# Patient Record
Sex: FEMALE | Race: WHITE | NOT HISPANIC OR LATINO | Employment: UNEMPLOYED | URBAN - METROPOLITAN AREA
[De-identification: names, ages, dates, MRNs, and addresses within clinical notes are randomized per-mention and may not be internally consistent; named-entity substitution may affect disease eponyms.]

---

## 2017-08-15 ENCOUNTER — GENERIC CONVERSION - ENCOUNTER (OUTPATIENT)
Dept: OTHER | Facility: OTHER | Age: 62
End: 2017-08-15

## 2017-09-19 ENCOUNTER — GENERIC CONVERSION - ENCOUNTER (OUTPATIENT)
Dept: OTHER | Facility: OTHER | Age: 62
End: 2017-09-19

## 2017-09-25 ENCOUNTER — ALLSCRIPTS OFFICE VISIT (OUTPATIENT)
Dept: OTHER | Facility: OTHER | Age: 62
End: 2017-09-25

## 2017-09-25 ENCOUNTER — TRANSCRIBE ORDERS (OUTPATIENT)
Dept: RADIOLOGY | Facility: HOSPITAL | Age: 62
End: 2017-09-25

## 2017-09-25 ENCOUNTER — HOSPITAL ENCOUNTER (OUTPATIENT)
Dept: RADIOLOGY | Facility: HOSPITAL | Age: 62
Discharge: HOME | End: 2017-09-25
Payer: COMMERCIAL

## 2017-09-25 DIAGNOSIS — M25.551 PAIN IN RIGHT HIP: ICD-10-CM

## 2017-09-25 PROCEDURE — 73502 X-RAY EXAM HIP UNI 2-3 VIEWS: CPT

## 2017-09-29 ENCOUNTER — GENERIC CONVERSION - ENCOUNTER (OUTPATIENT)
Dept: OTHER | Facility: OTHER | Age: 62
End: 2017-09-29

## 2017-09-29 ENCOUNTER — HOSPITAL ENCOUNTER (OUTPATIENT)
Dept: BONE DENSITY | Facility: IMAGING CENTER | Age: 62
Discharge: HOME | End: 2017-09-29
Payer: COMMERCIAL

## 2017-09-29 DIAGNOSIS — Z12.31 ENCOUNTER FOR SCREENING MAMMOGRAM FOR MALIGNANT NEOPLASM OF BREAST: ICD-10-CM

## 2017-09-29 PROCEDURE — G0202 SCR MAMMO BI INCL CAD: HCPCS

## 2017-10-02 ENCOUNTER — GENERIC CONVERSION - ENCOUNTER (OUTPATIENT)
Dept: OTHER | Facility: OTHER | Age: 62
End: 2017-10-02

## 2017-10-05 ENCOUNTER — ALLSCRIPTS OFFICE VISIT (OUTPATIENT)
Dept: OTHER | Facility: OTHER | Age: 62
End: 2017-10-05

## 2017-10-06 NOTE — PROGRESS NOTES
Assessment  1  Rectocele (954 97) (N81 6)    Discussion/Summary  Discussion Summary:   Rectocele, mild uterine prolapse - explained this to pt in detail  Information given for her to review  Recommended kegels, discussed pessary and surgery  She does not want to do anything at this time  She will follow up with her yearly  Counseling Documentation With Imm: The patient was counseled regarding  Goals and Barriers: The patient has the current Goals: See discussion  The patent has the current Barriers: None  Patient's Capacity to Self-Care: Patient is able to Self-Care  Chief Complaint  Chief Complaint Free Text Note Form: PT C/O DROPPED UTERUS      History of Present Illness  HPI: Pt here for evaluation of prolapse  I noticed it last year on her exam but she was asymptomatic  2 weeks ago, after intercourse, she felt something in the vagina  She had no pain or bleeding  It slowly resolved  Review of Systems  Focused-Female:   Constitutional: No fever, no chills, feels well, no tiredness, no recent weight gain or loss  ENT: no ear ache, no loss of hearing, no nosebleeds or nasal discharge, no sore throat or hoarseness  Cardiovascular: no complaints of slow or fast heart rate, no chest pain, no palpitations, no leg claudication or lower extremity edema  Respiratory: no complaints of shortness of breath, no wheezing, no dyspnea on exertion, no orthopnea or PND  Breasts: no complaints of breast pain, breast lump or nipple discharge  Gastrointestinal: no complaints of abdominal pain, no constipation, no nausea or diarrhea, no vomiting, no bloody stools  Genitourinary: no complaints of dysuria, no incontinence, no pelvic pain, no dysmenorrhea, no vaginal discharge or abnormal vaginal bleeding  Musculoskeletal: no complaints of arthralgia, no myalgia, no joint swelling or stiffness, no limb pain or swelling     Integumentary: no complaints of skin rash or lesion, no itching or dry skin, no skin wounds  Neurological: no complaints of headache, no confusion, no numbness or tingling, no dizziness or fainting  ROS Reviewed:   ROS reviewed  Active Problems  1  Cervical cancer screening (V76 2) (Z12 4)   2  DM2 (diabetes mellitus, type 2) (250 00) (E11 9)   3  Encounter for annual routine gynecological examination (V72 31) (Z01 419)   4  Hip pain, right (719 45) (M25 551)   5  Hyperlipidemia (272 4) (E78 5)   6  Hypertension (401 9) (I10)   7  Hypothyroidism (244 9) (E03 9)   8  Insomnia (780 52) (G47 00)   9  Need for prophylactic vaccination and inoculation against influenza (V04 81) (Z23)   10  Onychomycosis (110 1) (B35 1)   11  Screening for breast cancer (V76 10) (Z12 31)   12  Screening for cervical cancer (V76 2) (Z12 4)   13  Screening for colorectal cancer (V76 51) (Z12 11,Z12 12)   14  Screening for HPV (human papillomavirus) (V73 81) (Z11 51)   15  Skin lesion (709 9) (L98 9)   16  Status post gastric bypass for obesity (V45 86) (Z98 84)   17  Visit for screening mammogram (V76 12) (Z12 31)    Past Medical History  1  Need for prophylactic vaccination and inoculation against influenza (V04 81) (Z23)  Active Problems And Past Medical History Reviewed: The active problems and past medical history were reviewed and updated today  Surgical History  1  History of Gastric Surgery For Morbid Obesity Bypass With Anton-en-Y  Surgical History Reviewed: The surgical history was reviewed and updated today  Family History  Mother    1  Family history of dementia (V17 2) (Z81 8)  Father    2  Family history of hypertension (V17 49) (Z82 49)  Son    3  Family history of Melanoma  Family History Reviewed: The family history was reviewed and updated today  Social History   · Caffeine use (V49 89) (F15 90)   ·    · Never a smoker   · No drug use   · Two children  Social History Reviewed: The social history was reviewed and updated today  Current Meds   1   Levothyroxine Sodium 137 MCG Oral Tablet; TAKE ONE (1) TABLET(S) DAILY; Therapy: 66NJV5700 to (Lonron Pleasure)  Requested for: 05Apr2017; Last   Rx:05Apr2017 Ordered   2  Tylenol PM Extra Strength 500-25 MG Oral Tablet; Therapy: (Clois Lam) to Recorded   3  Vimovo 500-20 MG Oral Tablet Delayed Release; Therapy: (Recorded:05Oct2017) to Recorded  Medication List Reviewed: The medication list was reviewed and updated today  Allergies  1  No Known Drug Allergies    Vitals  Vital Signs    Recorded: 27YEC0963 93:28LY   Systolic 951, LUE, Sitting   Diastolic 82, LUE, Sitting   Height 5 ft 4 in   Weight 183 lb 2 oz   BMI Calculated 31 43   BSA Calculated 1 88   LMP POSTMENOPAUSAL     Physical Exam    Genitourinary   External genitalia: Normal and no lesions appreciated  Vagina: Abnormal  -redundant vaginal walls  Urethra: Normal     Urethral meatus: Normal     Bladder: Normal, soft, non-tender and no prolapse or masses appreciated  -no cystocele  Cervix: Normal, no palpable masses  Uterus: Normal, non-tender, not enlarged, and no palpable masses  -mild uterine prolapse  Adnexa/parametria: Normal, non-tender and no fullness or masses appreciated  Anus, perineum, and rectum: Abnormal   Large rectocele noted      Health Management  DM2 (diabetes mellitus, type 2)   (every) MICROALBUMIN, RANDOM URINE (W/CREATININE); every 1 year; Next Due: 92DGM9614; Overdue  *VB - Foot Exam; every 1 year; Last 68JTP0507; Next Due: 01Jun2016; Overdue  Screening for cervical cancer   (1) THIN PREP PAP WITH IMAGING; every 3 years; Next Due: 12VJW8048; Overdue  Screening for colorectal cancer   COLONOSCOPY; every 10 years; Next Due: 40RLM0851; Overdue  Health Maintenance   (every) MICROALBUMIN, RANDOM URINE (W/CREATININE); every 1 year; Next Due: 97DZL7487; Overdue  *VB - Foot Exam; every 1 year; Last 75QSO0280; Next Due: 01Jun2016;  Overdue    Future Appointments    Date/Time Provider Specialty Site   10/13/2017 09:00 AM Hyun Langford MD General Surgery [facility]     Signatures   Electronically signed by :  Johnny Melgar DO; Oct  5 2017  2:29PM EST                       (Author)

## 2017-10-13 ENCOUNTER — ALLSCRIPTS OFFICE VISIT (OUTPATIENT)
Dept: OTHER | Facility: OTHER | Age: 62
End: 2017-10-13

## 2017-10-24 NOTE — CONSULTS
Assessment  1  Screening for colorectal cancer (V76 51) (Z12 11,Z12 12)   2  DM2 (diabetes mellitus, type 2) (250 00) (E11 9)   3  Hyperlipidemia (272 4) (E78 5)   4  Hypertension (401 9) (I10)   5  Rectocele (618 04) (N81 6)    Discussion/Summary  Discussion Summary:   55-year-old female due for her screening Colonoscopy  Asymptomatic with a negative family history  Colonoscopy  regarding the prep: She has had a gastric bypass and therefore we will spread the volume of the preparation over twice as many hours so she can tolerated  include obesity, type 2 diabetes, hypertension, hyperlipidemia, rectocele  Rectocele should not prevent Colonoscopy but may make this technically more difficult  Chief Complaint  Chief Complaint Free Text Note Form: Colonoscopy consult      History of Present Illness  HPI: 55-year-old female presents for 1st screening Colonoscopy  No prior Colonoscopy  Moves her bowels regularly with no abdominal pain  Notes she has a rectocele recently noted by the OB gyn  She was given some information about pelvic PT and keel exercises  abdominal surgery positive for open cholecystectomy and gastric bypass surgery at [facility] some number of years ago  prior reaction to anesthesia  Nonsmoker  history of maternal great grandmother diagnosed at unknown age with ?colon cancer  No 1st degree relatives with colon cancer  Review of Systems  Complete-Female:   Constitutional: no fever-- and-- no chills  Eyes: no eye pain  ENT: no earache  Cardiovascular: no chest pain-- and-- no palpitations  Respiratory: no cough  Gastrointestinal: no nausea  Genitourinary: no pelvic pain  Musculoskeletal: no joint swelling  Integumentary: no itching  Neurological: no numbness  Psychiatric: no anxiety-- and-- no depression  Endocrine: no muscle weakness  Hematologic/Lymphatic: no tendency for easy bleeding  Active Problems  1  Cervical cancer screening (V76 2) (Z12 4)   2  DM2 (diabetes mellitus, type 2) (250 00) (E11 9)   3  Encounter for annual routine gynecological examination (V72 31) (Z01 419)   4  Hip pain, right (719 45) (M25 551)   5  Hyperlipidemia (272 4) (E78 5)   6  Hypertension (401 9) (I10)   7  Hypothyroidism (244 9) (E03 9)   8  Insomnia (780 52) (G47 00)   9  Need for prophylactic vaccination and inoculation against influenza (V04 81) (Z23)   10  Onychomycosis (110 1) (B35 1)   11  Rectocele (618 04) (N81 6)   12  Screening for breast cancer (V76 10) (Z12 31)   13  Screening for cervical cancer (V76 2) (Z12 4)   14  Screening for colorectal cancer (V76 51) (Z12 11,Z12 12)   15  Screening for HPV (human papillomavirus) (V73 81) (Z11 51)   16  Status post gastric bypass for obesity (V45 86) (Z98 84)   17  Visit for screening mammogram (V76 12) (Z12 31)    Past Medical History  1  Need for prophylactic vaccination and inoculation against influenza (V04 81) (Z23)   2  History of Skin lesion (709 9) (L98 9)  Active Problems And Past Medical History Reviewed: The active problems and past medical history were reviewed and updated today  Surgical History  1  History of Cholecystotomy   2  History of Destruction Of Malignant Lesion, Total Number ___   3  History of Gastric Surgery For Morbid Obesity Bypass With Anton-en-Y  Surgical History Reviewed: The surgical history was reviewed and updated today  Family History  Mother    1  Family history of dementia (V17 2) (Z81 8)  Father    2  Family history of hypertension (V17 49) (Z82 49)  Son    3  Family history of Melanoma  Family History Reviewed: The family history was reviewed and updated today  Social History   · Caffeine use (V49 89) (F15 90)   ·    · Never a smoker   · No drug use   · Two children  Social History Reviewed: The social history was reviewed and updated today  Current Meds   1  Levothyroxine Sodium 137 MCG Oral Tablet; TAKE ONE (1) TABLET(S) DAILY;    Therapy: 47QYJ8736 to (RDGFJUGO:15HMN6580)  Requested for: 21YPL0983; Last   Rx:51Bbx3132 Ordered   2  Tylenol PM Extra Strength 500-25 MG Oral Tablet; Therapy: (Recorded:28Oct2016) to Recorded  Medication List Reviewed: The medication list was reviewed and updated today  Allergies  1  No Known Drug Allergies    Vitals  Vital Signs    Recorded: 34RMO6237 08:47AM   Temperature 97 8 F, Tympanic   Heart Rate 76, L Radial   Respiration 16   Systolic 598, LUE, Sitting   Diastolic 90, LUE, Sitting   Height 5 ft 4 in   Weight 181 lb    BMI Calculated 31 07   BSA Calculated 1 87     Physical Exam    Constitutional Well dressed well-appearing 55-year-old female, slightly obese, appears stated age  Alert and oriented x3  Answering questions appropriately throughout the examination  Pulmonary   Respiratory effort: No increased work of breathing or signs of respiratory distress  Auscultation of lungs: Clear to auscultation, equal breath sounds bilaterally, no wheezes, no rales, no rhonci  Cardiovascular   Auscultation of heart: Normal rate and rhythm, normal S1 and S2, without murmurs  Abdomen Abdomen status post open cholecystectomy with well-healed scar  No palpable hernias noted  Normoactive bowel sounds  No tenderness rebound or guarding  Skin   Skin and subcutaneous tissue: Normal without rashes or lesions  Psychiatric   Orientation to person, place, and time: Normal     Mood and affect: Normal        Provider Comments  Provider Comments:   -   was discussed with the patient at length for Colonoscopy  discussion regarding risks, benefits, and alternatives  Risks of colonoscopy discussed to include bleeding, injury to internal organs, and perforation, which would require surgery for repair  A colon perforation may result in an ostomy bag if necessary for repair  The complications may be identified at the time of procedure or may be delayed in identification     is also a certain percentage of patients who have missed polyps, polyps not seen on the original colonoscopy  This is in inherent risks of the procedure  The patient is aware of Dr Michael Clark and complication rates for colonoscopy  preparation was explained to the patient at length, including a bowel preparation  Information sheet for bowel preparation is both given to patient and explained in detail  was instructed to take their hypertension medications prior to surgery, stop any blood thinners, and modulate their diabetes medications as per their Primary Care Physician's instructions  understand the risks and benefits and agreed to the plan  Questions are discussed and answered at length  the multiple comorbidities and the need for screening endoscopy, medical decision-making determined to be moderate for this visit        Signatures   Electronically signed by : Virlinda Lombard, [facility]; Oct 13 2017  9:27AM EST                       (Author)    Electronically signed by : Keshawn Cuellar MD; Oct 23 2017  1:49PM EST

## 2017-10-27 RX ORDER — ACETAMINOPHEN,DIPHENHYDRAMINE HCL 500; 25 MG/1; MG/1
1 TABLET, FILM COATED ORAL
COMMUNITY
End: 2019-06-21

## 2017-10-27 RX ORDER — LEVOTHYROXINE SODIUM 0.1 MG/1
137 TABLET ORAL DAILY
COMMUNITY
End: 2018-04-27 | Stop reason: SDUPTHER

## 2017-10-30 ENCOUNTER — ANESTHESIA EVENT (OUTPATIENT)
Dept: GASTROENTEROLOGY | Facility: HOSPITAL | Age: 62
End: 2017-10-30
Payer: COMMERCIAL

## 2017-10-31 ENCOUNTER — GENERIC CONVERSION - ENCOUNTER (OUTPATIENT)
Dept: PERIOP | Facility: HOSPITAL | Age: 62
End: 2017-10-31

## 2017-10-31 ENCOUNTER — HOSPITAL ENCOUNTER (OUTPATIENT)
Facility: HOSPITAL | Age: 62
Setting detail: OUTPATIENT SURGERY
Discharge: HOME | End: 2017-10-31
Attending: SURGERY | Admitting: SURGERY
Payer: COMMERCIAL

## 2017-10-31 ENCOUNTER — ANESTHESIA (OUTPATIENT)
Dept: GASTROENTEROLOGY | Facility: HOSPITAL | Age: 62
End: 2017-10-31
Payer: COMMERCIAL

## 2017-10-31 VITALS
DIASTOLIC BLOOD PRESSURE: 69 MMHG | TEMPERATURE: 97.1 F | HEART RATE: 50 BPM | RESPIRATION RATE: 16 BRPM | SYSTOLIC BLOOD PRESSURE: 142 MMHG | BODY MASS INDEX: 28.68 KG/M2 | WEIGHT: 168 LBS | OXYGEN SATURATION: 100 % | HEIGHT: 64 IN

## 2017-10-31 DIAGNOSIS — Z12.11 ENCOUNTER FOR SCREENING FOR MALIGNANT NEOPLASM OF COLON: ICD-10-CM

## 2017-10-31 PROCEDURE — 88305 TISSUE EXAM BY PATHOLOGIST: CPT | Performed by: SURGERY

## 2017-10-31 RX ORDER — SODIUM CHLORIDE 9 MG/ML
125 INJECTION, SOLUTION INTRAVENOUS CONTINUOUS
Status: DISCONTINUED | OUTPATIENT
Start: 2017-10-31 | End: 2017-10-31 | Stop reason: HOSPADM

## 2017-10-31 RX ORDER — PROPOFOL 10 MG/ML
INJECTION, EMULSION INTRAVENOUS AS NEEDED
Status: DISCONTINUED | OUTPATIENT
Start: 2017-10-31 | End: 2017-10-31 | Stop reason: SURG

## 2017-10-31 RX ADMIN — PROPOFOL 50 MG: 10 INJECTION, EMULSION INTRAVENOUS at 09:20

## 2017-10-31 RX ADMIN — PROPOFOL 40 MG: 10 INJECTION, EMULSION INTRAVENOUS at 09:28

## 2017-10-31 RX ADMIN — SODIUM CHLORIDE 125 ML/HR: 0.9 INJECTION, SOLUTION INTRAVENOUS at 08:34

## 2017-10-31 RX ADMIN — PROPOFOL 100 MG: 10 INJECTION, EMULSION INTRAVENOUS at 09:17

## 2017-10-31 RX ADMIN — LIDOCAINE HYDROCHLORIDE 50 MG: 20 INJECTION, SOLUTION INTRAVENOUS at 09:17

## 2017-10-31 RX ADMIN — PROPOFOL 50 MG: 10 INJECTION, EMULSION INTRAVENOUS at 09:24

## 2017-10-31 RX ADMIN — PROPOFOL 40 MG: 10 INJECTION, EMULSION INTRAVENOUS at 09:33

## 2017-10-31 RX ADMIN — PROPOFOL 20 MG: 10 INJECTION, EMULSION INTRAVENOUS at 09:37

## 2017-10-31 NOTE — DISCHARGE SUMMARY
Discharge Summary - Yang Booker 58 y o  female MRN: [id number]    Unit/Bed#: ENDO POOL Encounter: [id number]      Pre-Operative Diagnosis: Pre-Op Diagnosis Codes:     * Encounter for screening for malignant neoplasm of colon [Z12 11]    Post-Operative Diagnosis: Post-Op Diagnosis Codes:     * Encounter for screening for malignant neoplasm of colon [Z12 11]     * Adenomatous polyps [D36 9]     * Colitis [K52 9]    Procedures Performed:  Procedure(s):  COLONOSCOPY with bx and tatoo at 20cm, polypectomies x2    Surgeon: Ani Fernandes MD    See H & P for full details of admission and Operative Note for full details of operations performed  Patient was seen and examined prior to discharge  Provisions for Follow-Up Care:  See After Visit Summary for information related to follow-up care and home orders  Disposition: Home, in stable condition  Planned Readmission: No    Discharge Medications:  See after visit summary for reconciled discharge medications provided to patient and family  Post Operative instructions: Reviewed with patient and/or family  Some portions of this record may have been generated with voice recognition software  There may be translation, syntax,  or grammatical errors  Occasional wrong word or "sound-a-like" substitutions may have occurred due to the inherent limitations of the voice recognition software  Read the chart carefully and recognize, using context, where substations may have occurred  If you have any questions, please contact the dictating provider for clarification or correction, as needed       Signature:   Ani Fernandes MD  Date: 10/31/2017 Time: 9:43 AM

## 2017-10-31 NOTE — ANESTHESIA PREPROCEDURE EVALUATION
Review of Systems/Medical History  Patient summary reviewed  Chart reviewed  No history of anesthetic complications     Cardiovascular  Negative cardio ROS No Hyperlipidemia, No hypertension ,    Pulmonary  Negative pulmonary ROS ,        GI/Hepatic  Negative GI/hepatic ROS   Bowel prep            Endo/Other  Diabetes Diet controlled, History of thyroid disease , hypothyroidism,      GYN  Negative gynecology ROS          Hematology  Negative hematology ROS      Musculoskeletal  Negative musculoskeletal ROS        Neurology  Negative neurology ROS      Psychology   Negative psychology ROS            Physical Exam    Airway    Mallampati score: I  TM Distance: >3 FB  Neck ROM: full     Dental       Cardiovascular  Comment: Negative ROS, Rhythm: regular, Rate: normal, Cardiovascular exam normal    Pulmonary  Pulmonary exam normal Breath sounds clear to auscultation,     Other Findings        Anesthesia Plan  ASA Score- 2       Anesthesia Type- IV sedation with anesthesia with ASA Monitors  Additional Monitors:   Airway Plan:           Induction- intravenous  Informed Consent- Anesthetic plan and risks discussed with patient and spouse  I personally reviewed this patient with the CRNA  Discussed and agreed on the Anesthesia Plan with the CRNA  Carli Cole

## 2017-10-31 NOTE — OP NOTE
COLONOSCOPY with bx and tatoo at 20cm, polypectomies x2  Postoperative Note  PATIENT NAME: Gavino Franklin  : 1955  MRN: [id number]  [facility] GI ROOM 01    Surgery Date: 10/31/2017      Pre operative diagnosis:   Encounter for screening for malignant neoplasm of colon [Z12 11]    Operative Indications:  Due for Colonoscopy      Consent:  The risks, benefits, and alternatives to the surgery were discussed with the patient and with the family prior to surgery if available, personally by Dr Radha Chang  If the consent was obtained by the physician assistant or other representative, the consent was reviewed once again personally by the operating physician  Common complications particular for this procedure as well as unusual complications were discussed, including but not limited to:  bleeding, wound infection, prolonged wound healing, open wounds, reoperation, leak from the bowel or viscus, leak from the bile duct or injury to adjacent or other organs or blood vessels in the abdomen, and possible surgery or reoperation  A  was used if necessary  The patient expressed understanding of the issues discussed and wished and consented to the procedure to proceed  All questions were answered  Dr Radha Chang personally discussed the informed consent with this patient  Post-Operative Diagnosis and Findings:     Diverticulosis and Hemorrhoids     Multiple polyp(s) seen in Sigmoid Colon      Procedure:    Procedure(s):  COLONOSCOPY with bx and tatoo at 20cm, polypectomies x2 with Hot forcep polypectomy      Surgeon(s) and Role:     * Nick Wall MD - Primary    I was present for the entire procedure       Specimens:  ID Type Source Tests Collected by Time Destination   1 : bx at 20cm colon, focal colitis, R/O malignancy Tissue Colon TISSUE EXAM Nick Wall MD 10/31/2017 6487    2 : 1cm polyp at 18cm Tissue Colon TISSUE EXAM Nick Wall MD 10/31/2017 0932    3 : polyp at 15cm Tissue Colon TISSUE Chanelle Chua MD 10/31/2017 0882        Estimated Blood Loss:   Minimal    Anesthesia Type:   Choice     Procedure: The patient was seen in the Holding Room  The risks, benefits, complications, treatment options, and expected outcomes were discussed with the patient  The possibilities of reaction to medication, pulmonary aspiration, perforation of viscus, bleeding, recurrent infection, the need for additional procedures, failure to diagnose a condition, missed polyps, a complication requiring transfusion or operation were discussed with the patient  The patient concurred with the proposed plan, giving informed consent  The patient was taken to Endoscopy Suite, identified as Yang Sax  Staff confirmed patient name, , and procedure  A Time Out was held and the above information confirmed  A visual and digital exam was carried out of the anus and anal canal   Findings were normal unless specified below  The colonoscope was passed per anus and traversed to the cecum  The cecum was clearly visualized in the right lower quadrant by light reflex and palpation  The scope was withdrawn  There were mucosal lesion(s) and/or polyp(s) seen in the colon  These polyps were located at: sigmoid colon  The polyp(s) were addressed using polypectomy technique described above  At approximately 20 cm there was an area of focal colitis which was quite firm and mildly ulcerated  Multiple biopsies, at least 6, were taken of this area  This was not amenable to a snare or hot forceps polypectomy  This area was then marked with a tattoo in the submucosa  There were diverticula scattered throughout the sigmoid colon and grade 1 and 2 hemorrhoids  A photograph was taken  The scope was retroflexed  There were no anorectal lesions other than the hemorrhoids  The scope was removed  The patient tolerated the procedure well  Withdrawal time was greater than 10 minutes  Prep was good  at a 4/5  Some portions of this record may have been generated with voice recognition software  There may be translation, syntax,  or grammatical errors  Occasional wrong word or "sound-a-like" substitutions may have occurred due to the inherent limitations of the voice recognition software  Read the chart carefully and recognize, using context, where substations may have occurred  If you have any questions, please contact the dictating provider for clarification or correction, as needed         Complications: None    Condition: Stable to PACU    SIGNATURE: Arielle Mackay MD   DATE: October 31, 2017   TIME: 9:40 AM

## 2017-10-31 NOTE — ANESTHESIA POSTPROCEDURE EVALUATION
Post-Op Assessment Note      CV Status:  Stable    Mental Status:  Alert and awake    Hydration Status:  Euvolemic    PONV Controlled:  Controlled    Airway Patency:  Patent    Post Op Vitals Reviewed: Yes          Staff: Anesthesiologist, CRNA           BP      Temp     Pulse    Resp      SpO2

## 2017-10-31 NOTE — PROGRESS NOTES
D/C instructions given and pt verbalizes understanding  OOB to ambulate, gait steady denies dizziness

## 2017-10-31 NOTE — DISCHARGE INSTRUCTIONS
[facility]  Endoscopy Post-Operative Instructions  Dr Cristel Allen MD, FACS    Procedure: Colonoscopy    Findings:  Diverticulosis, Hemorrhoids and Polyp(s) and a focal area of colitis that was biopsied  Please call the office to check on these results which might necessitate an earlier follow-up  Follow-Up: You will need a repeat Endoscopy in (generally)5 years  Will await final pathology report for final determination of number of years until your follow up endoscopy, if you had polyps on this exam   Different types of polyps require different lengths of follow up surveillance  Please call our office or your primary doctor's office if you have any questions, once the report is returned  You should have an endoscopy sooner than recommended if you have any symptoms of bleeding or change in stools or other concerns  You will receive a call from our office with your results, in addition to the the preliminary results you received today  You will usually receive a follow-up letter from our office in 1-2 weeks  Call the office if you do not hear from us  You are welcome to also schedule an office visit if desired to discuss the results further  It is your responsibility to contact our office for results in 1- 2 weeks if you do not hear from us  If a follow up endoscopy is needed, you are responsible for arranging that follow up appointment at the appropriate time  The office may or may not issue a reminder at that future time  Please take responsibility for your own follow up healthcare  Diet: Eat a light snack first, and then resume your previous diet  Discharge Medications:  See after visit summary for reconciled discharge medications provided to patient and family        Current Facility-Administered Medications:     sodium chloride 0 9 % infusion, 125 mL/hr, Intravenous, Continuous, Tylor Nj DO, Last Rate: 125 mL/hr at 10/31/17 0834, 125 mL/hr at 10/31/17 0834  Do not take aspirin or blood thinning medications for 2 days following procedure  Tylenol is okay  You may have been given a new medication  Please take this (usually an anti-ulcer -type medication) and see your primary care doctor for refills and follow up medications if needed       After the test: Notify physician for arm swelling or pain at the intravenous site  You may have some abdominal discomfort following the procedure  Belching or passing flatus will help relieve it  Following upper endoscopy, you may experience a temporary sore throat  Saline gargles or lozenges can be taken to relieve sore throat Following lower endoscopy, minor rectal bleeding is not uncommon      Activity: Do not drive a car, operate machinery, or sign legal documents for 24 hours after your procedure  Normal activity may be resumed on the day following the procedure      Call the office at [phone] for any of the following: Severe abdominal pain, significant rectal bleeding, chills, or fever above 100°, new onset of persistent cough or persistent vomiting      [address], [address] [facility]  Phone: [phone]                Colorectal Polyps   WHAT YOU NEED TO KNOW:   Colorectal polyps are small growths of tissue in the lining of the colon and rectum  Most polyps are hyperplastic polyps and are usually benign (noncancerous)  Certain types of polyps, called adenomatous polyps, may turn into cancer  DISCHARGE INSTRUCTIONS:   Follow up with your healthcare provider or gastroenterologist as directed: You may need to return for more tests, such as another colonoscopy  Write down your questions so you remember to ask them during your visits  Reduce your risk for colorectal polyps:   · Eat a variety of healthy foods:  Healthy foods include fruit, vegetables, whole-grain breads, low-fat dairy products, beans, lean meat, and fish   Ask if you need to be on a special diet  · Maintain a healthy weight:  Ask your healthcare provider if you need to lose weight and how much you need to lose  Ask for help with a weight loss program     · Exercise:  Begin to exercise slowly and do more as you get stronger  Talk with your healthcare provider before you start an exercise program      · Limit alcohol:  Your risk for polyps increases the more you drink  · Do not smoke: If you smoke, it is never too late to quit  Ask for information about how to stop  For support and more information:   · Ma[facility] Real ([facility])  [address] [place], [place] [address]  Phone: [phone]  Web Address: www digestive  niddk nih gov  Contact your healthcare provider or gastroenterologist if:   · You have a fever  · You have chills, a cough, or feel weak and achy  · You have abdominal pain that does not go away or gets worse after you take medicine  · Your abdomen is swollen  · You are losing weight without trying  · You have questions or concerns about your condition or care  Seek care immediately or call 911 if:   · You have sudden shortness of breath  · You have a fast heart rate, fast breathing, or are too dizzy to stand up  · You have severe abdominal pain  · You see blood in your bowel movement  © 2017 [address]  Information is for End User's use only and may not be sold, redistributed or otherwise used for commercial purposes  All illustrations and images included in CareNotes® are the copyrighted property of A D A M , Inc  or [employer] Mach[employer]  The above information is an  only  It is not intended as medical advice for individual conditions or treatments  Talk to your doctor, nurse or pharmacist before following any medical regimen to see if it is safe and effective for you        Diverticulosis   WHAT YOU NEED TO KNOW:   Diverticulosis is a condition that causes small pockets called diverticula to form in your intestine  These pockets make it difficult for bowel movements to pass through your digestive system  DISCHARGE INSTRUCTIONS:   Seek care immediately if:   · You have severe pain on the left side of your lower abdomen  · Your bowel movements are bright or dark red  Contact your healthcare provider if:   · You have a fever and chills  · You feel dizzy or lightheaded  · You have nausea, or you are vomiting  · You have a change in your bowel movements  · You have questions or concerns about your condition or care  Medicines:   · Medicines  to soften your bowel movements may be given  You may also need medicines to treat symptoms such as bloating and pain  · Take your medicine as directed  Contact your healthcare provider if you think your medicine is not helping or if you have side effects  Tell him or her if you are allergic to any medicine  Keep a list of the medicines, vitamins, and herbs you take  Include the amounts, and when and why you take them  Bring the list or the pill bottles to follow-up visits  Carry your medicine list with you in case of an emergency  Self-care: The goal of treatment is to manage any symptoms you have and prevent other problems such as diverticulitis  Diverticulitis is swelling or infection of the diverticula  Your healthcare provider may recommend any of the following:  · Eat a variety of high-fiber foods  High-fiber foods help you have regular bowel movements  High-fiber foods include cooked beans, fruits, vegetables, and some cereals  Most adults need 25 to 35 grams of fiber each day  Your healthcare provider may recommend that you have more  Ask your healthcare provider how much fiber you need  Increase fiber slowly  You may have abdominal discomfort, bloating, and gas if you add fiber to your diet too quickly  You may need to take a fiber supplement if you are not getting enough fiber from food  · Drink liquids as directed  You may need to drink 2 to 3 liters (8 to 12 cups) of liquids every day  Ask your healthcare provider how much liquid to drink each day and which liquids are best for you  · Apply heat  on your abdomen for 20 to 30 minutes every 2 hours for as many days as directed  Heat helps decrease pain and muscle spasms  Help prevent diverticulitis or other symptoms: The following may help decrease your risk for diverticulitis or symptoms, such as bleeding  Talk to your provider about these or other things you can do to prevent problems that may occur with diverticulosis  · Exercise regularly  Ask your healthcare provider about the best exercise plan for you  Exercise can help you have regular bowel movements  Get 30 minutes of exercise on most days of the week  · Maintain a healthy weight  Ask your healthcare provider how much you should weigh  Ask him or her to help you create a weight loss plan if you are overweight  · Do not smoke  Nicotine and other chemicals in cigarettes increase your risk for diverticulitis  Ask your healthcare provider for information if you currently smoke and need help to quit  E-cigarettes or smokeless tobacco still contain nicotine  Talk to your healthcare provider before you use these products  · Ask your healthcare provider if it is safe to take NSAIDs  NSAIDs may increase your risk of diverticulitis  Follow up with your healthcare provider as directed:  Write down your questions so you remember to ask them during your visits  © 2017 [address] [employer] Information is for End User's use only and may not be sold, redistributed or otherwise used for commercial purposes  All illustrations and images included in CareNotes® are the copyrighted property of A D A M , Inc  or [address]  The above information is an  only  It is not intended as medical advice for individual conditions or treatments   Talk to your doctor, nurse or pharmacist before following any medical regimen to see if it is safe and effective for you  Hemorrhoids   WHAT YOU NEED TO KNOW:   Hemorrhoids are swollen blood vessels inside your rectum (internal hemorrhoids) or on your anus (external hemorrhoids)  Sometimes a hemorrhoid may prolapse  This means it extends out of your anus  DISCHARGE INSTRUCTIONS:   Seek care immediately if:   · You have severe pain in your rectum or around your anus  · You have severe pain in your abdomen and you are vomiting  · You have bleeding from your anus that soaks through your underwear  Contact your healthcare provider if:   · You have frequent and painful bowel movements  · Your hemorrhoid looks or feels more swollen than usual      · You do not have a bowel movement for 2 days or more  · You see or feel tissue coming through your anus  · You have questions or concerns about your condition or care  Medicines: You may  need any of the following:  · Medicine  may be given to decrease pain, swelling, and itching  The medicine may come as a pad, cream, or ointment  · Stool softeners  help treat or prevent constipation  · NSAIDs , such as ibuprofen, help decrease swelling, pain, and fever  NSAIDs can cause stomach bleeding or kidney problems in certain people  If you take blood thinner medicine, always ask your healthcare provider if NSAIDs are safe for you  Always read the medicine label and follow directions  · Take your medicine as directed  Contact your healthcare provider if you think your medicine is not helping or if you have side effects  Tell him or her if you are allergic to any medicine  Keep a list of the medicines, vitamins, and herbs you take  Include the amounts, and when and why you take them  Bring the list or the pill bottles to follow-up visits  Carry your medicine list with you in case of an emergency    Manage your symptoms:   · Apply ice on your anus for 15 to 20 minutes every hour or as directed  Use an ice pack, or put crushed ice in a plastic bag  Cover it with a towel before you apply it to your anus  Ice helps prevent tissue damage and decreases swelling and pain  · Take a sitz bath  Fill a bathtub with 4 to 6 inches of warm water  You may also use a sitz bath pan that fits inside a toilet bowl  Sit in the sitz bath for 15 minutes  Do this 3 times a day, and after each bowel movement  The warm water can help decrease pain and swelling  · Keep your anal area clean  Gently wash the area with warm water daily  Soap may irritate the area  After a bowel movement, wipe with moist towelettes or wet toilet paper  Dry toilet paper can irritate the area  Prevent hemorrhoids:   · Do not strain to have a bowel movement  Do not sit on the toilet too long  These actions can increase pressure on the tissues in your rectum and anus  · Drink plenty of liquids  Liquids can help prevent constipation  Ask how much liquid to drink each day and which liquids are best for you  · Eat a variety of high-fiber foods  Examples include fruits, vegetables, and whole grains  Ask your healthcare provider how much fiber you need each day  You may need to take a fiber supplement  · Exercise as directed  Exercise, such as walking, may make it easier to have a bowel movement  Ask your healthcare provider to help you create an exercise plan  · Do not have anal sex  Anal sex can weaken the skin around your rectum and anus  · Avoid heavy lifting  This can cause straining and increase your risk for another hemorrhoid  Follow up with your healthcare provider as directed:  Write down your questions so you remember to ask them during your visits  © 2017 [address]  Information is for End User's use only and may not be sold, redistributed or otherwise used for commercial purposes   All illustrations and images included in CareNotes® are the copyrighted property of [employer]  or [employer]  The above information is an  only  It is not intended as medical advice for individual conditions or treatments  Talk to your doctor, nurse or pharmacist before following any medical regimen to see if it is safe and effective for you

## 2017-11-03 ENCOUNTER — GENERIC CONVERSION - ENCOUNTER (OUTPATIENT)
Dept: OTHER | Facility: OTHER | Age: 62
End: 2017-11-03

## 2017-11-08 ENCOUNTER — GENERIC CONVERSION - ENCOUNTER (OUTPATIENT)
Dept: OTHER | Facility: OTHER | Age: 62
End: 2017-11-08

## 2018-01-10 NOTE — MISCELLANEOUS
Message   Date: 19 Sep 2017 4:01 PM EST, Recorded By: Toshia Hong For: Shira Solanoer   Caller: Mellissai Mohs, Self   Phone: [phone] (Home), [phone] (Work)   Reason: Other   Patient called for mammo slip, lost previous one  Mailed per request         Active Problems    1  Cervical cancer screening (V76 2) (Z12 4)   2  DM2 (diabetes mellitus, type 2) (250 00) (E11 9)   3  Encounter for annual routine gynecological examination (V72 31) (Z01 419)   4  Hyperlipidemia (272 4) (E78 5)   5  Hypertension (401 9) (I10)   6  Hypothyroidism (244 9) (E03 9)   7  Insomnia (780 52) (G47 00)   8  Need for prophylactic vaccination and inoculation against influenza (V04 81) (Z23)   9  Onychomycosis (110 1) (B35 1)   10  Screening for breast cancer (V76 10) (Z12 31)   11  Screening for cervical cancer (V76 2) (Z12 4)   12  Screening for colorectal cancer (V76 51) (Z12 11,Z12 12)   13  Screening for HPV (human papillomavirus) (V73 81) (Z11 51)   14  Skin lesion (709 9) (L98 9)   15  Status post gastric bypass for obesity (V45 86) (Z98 84)   16  Visit for screening mammogram (V76 12) (Z12 31)    Current Meds   1  Atorvastatin Calcium 20 MG Oral Tablet; One po q day; Therapy: 14JNB8162 to (Last Rx:68Mux8950)  Requested for: 86Xzf8382 Ordered   2  Levothyroxine Sodium 137 MCG Oral Tablet; TAKE ONE (1) TABLET(S) DAILY; Therapy: 27BYH9720 to (Melinda Shade)  Requested for: 92Zwm1031; Last   Rx:84Kqb4322 Ordered   3  MetFORMIN HCl - 500 MG Oral Tablet; Take 1 tablet twice daily; Therapy: 52BBP5458 to (Marrion Dollar)  Requested for: 91Lqg6388; Last   Rx:10Vrg2052 Ordered   4  Terbinafine HCl - 250 MG Oral Tablet (LamISIL); TAKE 1 TABLET DAILY; Therapy: 88DKD1180 to (Evaluate:93Vpr4197); Last Rx:24Nov2015 Ordered   5  Tylenol PM Extra Strength 500-25 MG Oral Tablet; Therapy: (25-62-29-72) to Recorded   6   Zolpidem Tartrate 10 MG Oral Tablet; TAKE ONE (1) TABLET(S) DAILY AT BEDTIMEAS   NEEDED; Therapy: 95TZI8938 to (Evaluate:27Oct2016); Last Rx:30Jun2016 Ordered    Allergies    1   No Known Drug Allergies    Signatures   Electronically signed by : Alirio Palacios, ; Sep 19 2017  4:03PM EST                       (Author)

## 2018-01-11 NOTE — RESULT NOTES
Verified Results  (1) COMPREHENSIVE METABOLIC PANEL 90RCG5456 99:00WD Yareli Carrillo     Test Name Result Flag Reference   GLUCOSE 115 mg/dL H 65-99   Fasting reference interval   UREA NITROGEN (BUN) 19 mg/dL  7-25   CREATININE 0 85 mg/dL  0 50-0 99   For patients >52years of age, the reference limit  for Creatinine is approximately 13% higher for people  identified as -American  eGFR NON-AFR  AMERICAN 74 mL/min/1 73m2  > OR = 60   eGFR AFRICAN AMERICAN 86 mL/min/1 73m2  > OR = 60   BUN/CREATININE RATIO   2-57   NOT APPLICABLE (calc)   SODIUM 139 mmol/L  135-146   POTASSIUM 4 7 mmol/L  3 5-5 3   CHLORIDE 104 mmol/L     CARBON DIOXIDE 26 mmol/L  19-30   CALCIUM 9 3 mg/dL  8 6-10 4   PROTEIN, TOTAL 6 7 g/dL  6 1-8 1   ALBUMIN 4 3 g/dL  3 6-5 1   GLOBULIN 2 4 g/dL (calc)  1 9-3 7   ALBUMIN/GLOBULIN RATIO 1 8 (calc)  1 0-2 5   BILIRUBIN, TOTAL 0 5 mg/dL  0 2-1 2   ALKALINE PHOSPHATASE 115 U/L     AST 18 U/L  10-35   ALT 15 U/L  6-29     (1) LIPID PANEL, FASTING 75FLY6533 73:04RJ Chapin Rogers     Test Name Result Flag Reference   CHOLESTEROL, TOTAL 177 mg/dL  125-200   HDL CHOLESTEROL 65 mg/dL  > OR = 46   TRIGLICERIDES 671 mg/dL  <150   LDL-CHOLESTEROL 83 mg/dL (calc)  <130   Desirable range <100 mg/dL for patients with CHD or  diabetes and <70 mg/dL for diabetic patients with  known heart disease  CHOL/HDLC RATIO 2 7 (calc)  < OR = 5 0   NON HDL CHOLESTEROL 112 mg/dL (calc)     Target for non-HDL cholesterol is 30 mg/dL higher than   LDL cholesterol target  (Q) TSH, 3RD GENERATION 88UDK8238 74:33PD Yareli Carrillo     Test Name Result Flag Reference   TSH 3 38 mIU/L  0 40-4 50     (Q) HEMOGLOBIN A1c 98AQA7523 62:99XQ Chapin Rogers   REPORT COMMENT:  FASTING:YES     Test Name Result Flag Reference   HEMOGLOBIN A1c 7 0 % of total Hgb H <5 7   According to ADA guidelines, hemoglobin A1c <7 0%  represents optimal control in non-pregnant diabetic  patients   Different metrics may apply to specific  patient populations  Standards of Medical Care in    Diabetes Care  2013;36:s11-s66     For the purpose of screening for the presence of  diabetes  <5 7%       Consistent with the absence of diabetes  5 7-6 4%    Consistent with increased risk for diabetes              (prediabetes)  >or=6 5%    Consistent with diabetes     This assay result is consistent with diabetes  mellitus  Currently, no consensus exists for use of hemoglobin  A1c for diagnosis of diabetes for children  Discussion/Summary   all bw is stable   continue current meds

## 2018-01-12 VITALS
WEIGHT: 181 LBS | RESPIRATION RATE: 16 BRPM | HEART RATE: 76 BPM | SYSTOLIC BLOOD PRESSURE: 150 MMHG | DIASTOLIC BLOOD PRESSURE: 90 MMHG | BODY MASS INDEX: 30.9 KG/M2 | HEIGHT: 64 IN | TEMPERATURE: 97.8 F

## 2018-01-12 NOTE — RESULT NOTES
Discussion/Summary   xray of hip was read as normal  I recommend physical therapy eval  I will print out order     Verified Results  * XR HIP/PELV 2-3 VWS RIGHT W PELVIS IF PERFORMED [id number] 93:89GT Latanya Reid Order Number: [id number]     Test Name Result Flag Reference   * XR HIP/PELV 2-3 VWS RIGHT (Report)     RIGHT HIP     INDICATION: 18 pain x3 weeks, difficulty walking on leg  Describe this joint pain  Attention right hip  COMPARISON: None     VIEWS: AP pelvis and 2 coned down views of the hip     IMAGES: 3     FINDINGS:     There is no acute fracture or dislocation  No degenerative changes  No lytic or blastic lesions are seen  Multiple calcifications in the true pelvis likely are vascular in origin  IMPRESSION:     No acute osseous abnormality  Workstation performed: [id number]     Signed by:   Chris Patino MD   9/28/17       Plan  Hip pain, right    · *1 - [facility]  *  Status: Active  Requested for: 32IPP5942  Care Summary provided  : Yes

## 2018-01-13 NOTE — MISCELLANEOUS
Message   Recorded as Task   Date: 11/07/2017 09:17 AM, Created By: Yessica Hernandez   Task Name: Follow Up   Assigned To: Yessica Hernandez   Regarding Patient: Iverson Saint, Status: Active   CommentYajairaelsa Sanabria - 07 Nov 2017 9:17 AM     TASK CREATED  Pt calling to ask about a bulge she feels and see's around her vaginal area  Saw gyn and it is not related to her uterus or bladder  Feels it may be from inside rectal, colon area  Results of colonoscopy show 20cm area and would like to know if this is the cause of the bulge she has been seeing  Please advise  Sera Peng - 07 Nov 2017 9:31 AM     TASK REPLIED TO: Previously Assigned To Tamera Chiang  As we discussed, this is 20 cm up from the rectum  I do not think this is explaining her bulge in her vagina  She should follow up with GYN or pelvic physical therapy, which we can refer her to  Yessica Hernandez - 08 Nov 2017 4:20 PM     TASK EDITED  Spoke w/ pt about this and referred her back to gyn for further f/u  Active Problems    1  Cervical cancer screening (V76 2) (Z12 4)   2  DM2 (diabetes mellitus, type 2) (250 00) (E11 9)   3  Encounter for annual routine gynecological examination (V72 31) (Z01 419)   4  Hip pain, right (719 45) (M25 551)   5  Hyperlipidemia (272 4) (E78 5)   6  Hypertension (401 9) (I10)   7  Hypothyroidism (244 9) (E03 9)   8  Insomnia (780 52) (G47 00)   9  Need for prophylactic vaccination and inoculation against influenza (V04 81) (Z23)   10  Onychomycosis (110 1) (B35 1)   11  Rectocele (618 04) (N81 6)   12  Screening for breast cancer (V76 10) (Z12 31)   13  Screening for cervical cancer (V76 2) (Z12 4)   14  Screening for colorectal cancer (V76 51) (Z12 11,Z12 12)   15  Screening for HPV (human papillomavirus) (V73 81) (Z11 51)   16  Status post gastric bypass for obesity (V45 86) (Z98 84)   17  Visit for screening mammogram (V76 12) (Z12 31)    Current Meds   1   Levothyroxine Sodium 137 MCG Oral Tablet; TAKE ONE (1) TABLET(S) DAILY; Therapy: 19VGP4941 to (867 3868)  Requested for: 05Apr2017; Last   Rx:05Apr2017 Ordered   2  Tylenol PM Extra Strength 500-25 MG Oral Tablet; Therapy: (Recorded:28Oct2016) to Recorded    Allergies    1   No Known Drug Allergies    Signatures   Electronically signed by : Kai Talavera, ; Nov 8 2017  4:20PM EST                       (Author)

## 2018-01-14 VITALS
BODY MASS INDEX: 30.58 KG/M2 | HEART RATE: 68 BPM | SYSTOLIC BLOOD PRESSURE: 136 MMHG | RESPIRATION RATE: 16 BRPM | TEMPERATURE: 98 F | WEIGHT: 179.13 LBS | HEIGHT: 64 IN | DIASTOLIC BLOOD PRESSURE: 90 MMHG

## 2018-01-14 VITALS
BODY MASS INDEX: 31.27 KG/M2 | WEIGHT: 183.13 LBS | DIASTOLIC BLOOD PRESSURE: 82 MMHG | HEIGHT: 64 IN | SYSTOLIC BLOOD PRESSURE: 140 MMHG

## 2018-01-15 NOTE — MISCELLANEOUS
Message   Recorded as Task   Date: 11/03/2017 11:18 AM, Created By: Enoc Barros   Task Name: Call Back   Assigned To: [facility],Team   Regarding Patient: Homer Otero, Status: Active   Mi Smith - 03 Nov 2017 11:18 AM     TASK CREATED  Please call with result  Hyperplastic polyps were seen  Also colitis  Due for repeat Colonoscopy in 5 years  Sloane Headley - 42 Nov 2017 2:34 PM     TASK EDITED  Detailed message left call w/ questions or concerns  Active Problems    1  Cervical cancer screening (V76 2) (Z12 4)   2  DM2 (diabetes mellitus, type 2) (250 00) (E11 9)   3  Encounter for annual routine gynecological examination (V72 31) (Z01 419)   4  Hip pain, right (719 45) (M25 551)   5  Hyperlipidemia (272 4) (E78 5)   6  Hypertension (401 9) (I10)   7  Hypothyroidism (244 9) (E03 9)   8  Insomnia (780 52) (G47 00)   9  Need for prophylactic vaccination and inoculation against influenza (V04 81) (Z23)   10  Onychomycosis (110 1) (B35 1)   11  Rectocele (618 04) (N81 6)   12  Screening for breast cancer (V76 10) (Z12 31)   13  Screening for cervical cancer (V76 2) (Z12 4)   14  Screening for colorectal cancer (V76 51) (Z12 11,Z12 12)   15  Screening for HPV (human papillomavirus) (V73 81) (Z11 51)   16  Status post gastric bypass for obesity (V45 86) (Z98 84)   17  Visit for screening mammogram (V76 12) (Z12 31)    Current Meds   1  Levothyroxine Sodium 137 MCG Oral Tablet; TAKE ONE (1) TABLET(S) DAILY; Therapy: 31PVU4154 to (Merdis Batter)  Requested for: 05Apr2017; Last   Rx:05Apr2017 Ordered   2  Tylenol PM Extra Strength 500-25 MG Oral Tablet; Therapy: (Recorded:28Oct2016) to Recorded    Allergies    1   No Known Drug Allergies    Signatures   Electronically signed by : Jennifer Cabral, ; Nov  3 2017  2:34PM EST                       (Author)

## 2018-01-15 NOTE — MISCELLANEOUS
Message   Date: 02 Oct 2017 10:54 AM EST, Recorded By: Leta Santo For: Alice Gomes: Rajan Mckeon, Self   Phone: [phone] (Home), [phone] (Work)   Reason: Medical Complaint   pt has a dropped uterus    pt will schedule an appt           Active Problems    1  Cervical cancer screening (V76 2) (Z12 4)   2  DM2 (diabetes mellitus, type 2) (250 00) (E11 9)   3  Encounter for annual routine gynecological examination (V72 31) (Z01 419)   4  Hip pain, right (719 45) (M25 551)   5  Hyperlipidemia (272 4) (E78 5)   6  Hypertension (401 9) (I10)   7  Hypothyroidism (244 9) (E03 9)   8  Insomnia (780 52) (G47 00)   9  Need for prophylactic vaccination and inoculation against influenza (V04 81) (Z23)   10  Onychomycosis (110 1) (B35 1)   11  Screening for breast cancer (V76 10) (Z12 31)   12  Screening for cervical cancer (V76 2) (Z12 4)   13  Screening for colorectal cancer (V76 51) (Z12 11,Z12 12)   14  Screening for HPV (human papillomavirus) (V73 81) (Z11 51)   15  Skin lesion (709 9) (L98 9)   16  Status post gastric bypass for obesity (V45 86) (Z98 84)   17  Visit for screening mammogram (V76 12) (Z12 31)    Current Meds   1  Atorvastatin Calcium 20 MG Oral Tablet; One po q day; Therapy: 08GBG5017 to (Last Rx:74Avh8544)  Requested for: 15Gsp3797 Ordered   2  Levothyroxine Sodium 137 MCG Oral Tablet; TAKE ONE (1) TABLET(S) DAILY; Therapy: 96PMM0353 to (454 [id number])  Requested for: 05Apr2017; Last   Rx:[id number] Ordered   3  MetFORMIN HCl - 500 MG Oral Tablet; Take 1 tablet twice daily; Therapy: 68VBX8274 to (Herschell Bidding)  Requested for: 49Pcb3773; Last   Rx:[id number] Ordered   4  Terbinafine HCl - 250 MG Oral Tablet (LamISIL); TAKE 1 TABLET DAILY; Therapy: 74ZNO6824 to (Evaluate:72Jwe4814); Last Rx:24Nov2015 Ordered   5  Tylenol PM Extra Strength 500-25 MG Oral Tablet; Therapy: (Indira Whipple) to Recorded   6   Zolpidem Tartrate 10 MG Oral Tablet; TAKE ONE (1) TABLET(S) DAILY AT BEDTIMEAS   NEEDED; Therapy: 80MJT8221 to (Evaluate:27Oct2016); Last Rx:30Jun2016 Ordered    Allergies    1   No Known Drug Allergies    Signatures   Electronically signed by : Gino Krause, ; Oct  2 2017 10:54AM EST                       (Author)

## 2018-01-17 NOTE — MISCELLANEOUS
Message   Recorded as Task   Date: 11/01/2017 11:46 AM, Created By: Ellen Crocker   Task Name: Follow Up   Assigned To: [facility],Team   Regarding Patient: Anya Meng, Status: Active   Lexi Aly - 01 Nov 2017 11:46 AM     TASK CREATED  Call post colonoscopy to pt, left message to return call  Ellen Crocker - 01 Nov 2017 1:23 PM     TASK EDITED  Spoke w/ pt and she is doing well, no complaints and is back to normal adl's  Advised to call w/ questions or concerns and path is pending at this time  Ellen Crocker - 90 Nov 2017 2:35 PM     TASK EDITED  See 11/3 task for path results  Active Problems    1  Cervical cancer screening (V76 2) (Z12 4)   2  DM2 (diabetes mellitus, type 2) (250 00) (E11 9)   3  Encounter for annual routine gynecological examination (V72 31) (Z01 419)   4  Hip pain, right (719 45) (M25 551)   5  Hyperlipidemia (272 4) (E78 5)   6  Hypertension (401 9) (I10)   7  Hypothyroidism (244 9) (E03 9)   8  Insomnia (780 52) (G47 00)   9  Need for prophylactic vaccination and inoculation against influenza (V04 81) (Z23)   10  Onychomycosis (110 1) (B35 1)   11  Rectocele (618 04) (N81 6)   12  Screening for breast cancer (V76 10) (Z12 31)   13  Screening for cervical cancer (V76 2) (Z12 4)   14  Screening for colorectal cancer (V76 51) (Z12 11,Z12 12)   15  Screening for HPV (human papillomavirus) (V73 81) (Z11 51)   16  Status post gastric bypass for obesity (V45 86) (Z98 84)   17  Visit for screening mammogram (V76 12) (Z12 31)    Current Meds   1  Levothyroxine Sodium 137 MCG Oral Tablet; TAKE ONE (1) TABLET(S) DAILY; Therapy: 71WBX5762 to (Delmy Duran)  Requested for: 05Apr2017; Last   Rx:05Apr2017 Ordered   2  Tylenol PM Extra Strength 500-25 MG Oral Tablet; Therapy: (Recorded:28Oct2016) to Recorded    Allergies    1   No Known Drug Allergies    Signatures   Electronically signed by : Risa Ferguson, ; Nov  3 2017  2:35PM EST (Author)

## 2018-04-27 ENCOUNTER — TELEPHONE (OUTPATIENT)
Dept: FAMILY MEDICINE CLINIC | Facility: CLINIC | Age: 63
End: 2018-04-27

## 2018-04-27 DIAGNOSIS — E03.9 HYPOTHYROIDISM, UNSPECIFIED TYPE: ICD-10-CM

## 2018-04-27 DIAGNOSIS — E03.9 HYPOTHYROIDISM, UNSPECIFIED TYPE: Primary | ICD-10-CM

## 2018-04-27 RX ORDER — LEVOTHYROXINE SODIUM 0.1 MG/1
137 TABLET ORAL DAILY
Qty: 90 TABLET | Refills: 1 | Status: SHIPPED | OUTPATIENT
Start: 2018-04-27 | End: 2018-04-27 | Stop reason: SDUPTHER

## 2018-04-27 RX ORDER — LEVOTHYROXINE SODIUM 0.1 MG/1
137 TABLET ORAL DAILY
Qty: 135 TABLET | Refills: 3 | Status: SHIPPED | OUTPATIENT
Start: 2018-04-27 | End: 2018-05-01 | Stop reason: SDUPTHER

## 2018-04-27 NOTE — TELEPHONE ENCOUNTER
Elias Guillory from [facility] called stating that the patients Levothyroxine is supposed to be 90 supply quantity of 135 not 150    Please send corrected script to the pharmcay,

## 2018-05-01 ENCOUNTER — TELEPHONE (OUTPATIENT)
Dept: FAMILY MEDICINE CLINIC | Facility: CLINIC | Age: 63
End: 2018-05-01

## 2018-05-01 DIAGNOSIS — E03.9 HYPOTHYROIDISM, UNSPECIFIED TYPE: ICD-10-CM

## 2018-05-01 DIAGNOSIS — E03.9 HYPOTHYROIDISM, UNSPECIFIED TYPE: Primary | ICD-10-CM

## 2018-05-01 RX ORDER — LEVOTHYROXINE SODIUM 0.1 MG/1
TABLET ORAL
Qty: 135 TABLET | Refills: 3 | Status: SHIPPED | OUTPATIENT
Start: 2018-05-01 | End: 2019-06-21 | Stop reason: ALTCHOICE

## 2018-05-01 RX ORDER — LEVOTHYROXINE SODIUM 137 UG/1
TABLET ORAL
COMMUNITY
Start: 2018-02-07 | End: 2018-05-18 | Stop reason: SDUPTHER

## 2018-05-01 NOTE — TELEPHONE ENCOUNTER
RE: Levothyroxine 100 mcg    The quantity needs to be changed to 135 since the patient is to take 1 1/2 tablets daily  Please correct & resend  Thank you

## 2018-05-01 NOTE — TELEPHONE ENCOUNTER
Spoke with pt, this med was sent to [facility] not her mail order pharmacy  I did cancel the local if you could please resend to mail order pharmacy

## 2018-05-18 DIAGNOSIS — E03.9 HYPOTHYROIDISM, UNSPECIFIED TYPE: Primary | ICD-10-CM

## 2018-05-18 RX ORDER — LEVOTHYROXINE SODIUM 137 UG/1
TABLET ORAL
Qty: 90 TABLET | Refills: 2 | Status: SHIPPED | OUTPATIENT
Start: 2018-05-18 | End: 2020-01-08 | Stop reason: SDUPTHER

## 2018-05-25 RX ORDER — LEVOTHYROXINE SODIUM 137 UG/1
137 TABLET ORAL DAILY
Qty: 90 TABLET | Refills: 0 | Status: SHIPPED | OUTPATIENT
Start: 2018-05-25 | End: 2019-06-21 | Stop reason: SDUPTHER

## 2019-04-27 ENCOUNTER — TELEPHONE (OUTPATIENT)
Dept: OTHER | Facility: OTHER | Age: 64
End: 2019-04-27

## 2019-06-17 DIAGNOSIS — E03.9 HYPOTHYROIDISM, UNSPECIFIED TYPE: ICD-10-CM

## 2019-06-17 DIAGNOSIS — E03.9 HYPOTHYROIDISM, UNSPECIFIED TYPE: Primary | ICD-10-CM

## 2019-06-17 RX ORDER — LEVOTHYROXINE SODIUM 0.1 MG/1
TABLET ORAL
Qty: 135 TABLET | Refills: 3 | OUTPATIENT
Start: 2019-06-17

## 2019-06-17 NOTE — TELEPHONE ENCOUNTER
Patient called requesting a RX for bloodwork to have her thyroid checked for a followup appointment  Please advise patient at [phone]

## 2019-06-19 LAB
ALBUMIN SERPL-MCNC: 4.3 G/DL (ref 3.6–5.1)
ALBUMIN/GLOB SERPL: 1.9 (CALC) (ref 1–2.5)
ALP SERPL-CCNC: 133 U/L (ref 33–130)
ALT SERPL-CCNC: 28 U/L (ref 6–29)
AST SERPL-CCNC: 19 U/L (ref 10–35)
BASOPHILS # BLD AUTO: 31 CELLS/UL (ref 0–200)
BASOPHILS NFR BLD AUTO: 0.6 %
BILIRUB SERPL-MCNC: 0.5 MG/DL (ref 0.2–1.2)
BUN SERPL-MCNC: 16 MG/DL (ref 7–25)
BUN/CREAT SERPL: ABNORMAL (CALC) (ref 6–22)
CALCIUM SERPL-MCNC: 9.3 MG/DL (ref 8.6–10.4)
CHLORIDE SERPL-SCNC: 104 MMOL/L (ref 98–110)
CHOLEST SERPL-MCNC: 158 MG/DL
CHOLEST/HDLC SERPL: 3 (CALC)
CO2 SERPL-SCNC: 30 MMOL/L (ref 20–32)
CREAT SERPL-MCNC: 0.91 MG/DL (ref 0.5–0.99)
EOSINOPHIL # BLD AUTO: 68 CELLS/UL (ref 15–500)
EOSINOPHIL NFR BLD AUTO: 1.3 %
ERYTHROCYTE [DISTWIDTH] IN BLOOD BY AUTOMATED COUNT: 13.8 % (ref 11–15)
GLOBULIN SER CALC-MCNC: 2.3 G/DL (CALC) (ref 1.9–3.7)
GLUCOSE SERPL-MCNC: 122 MG/DL (ref 65–99)
HCT VFR BLD AUTO: 42.6 % (ref 35–45)
HDLC SERPL-MCNC: 52 MG/DL
HGB BLD-MCNC: 13.8 G/DL (ref 11.7–15.5)
LDLC SERPL CALC-MCNC: 79 MG/DL (CALC)
LYMPHOCYTES # BLD AUTO: 1732 CELLS/UL (ref 850–3900)
LYMPHOCYTES NFR BLD AUTO: 33.3 %
MCH RBC QN AUTO: 28.3 PG (ref 27–33)
MCHC RBC AUTO-ENTMCNC: 32.4 G/DL (ref 32–36)
MCV RBC AUTO: 87.5 FL (ref 80–100)
MONOCYTES # BLD AUTO: 338 CELLS/UL (ref 200–950)
MONOCYTES NFR BLD AUTO: 6.5 %
NEUTROPHILS # BLD AUTO: 3032 CELLS/UL (ref 1500–7800)
NEUTROPHILS NFR BLD AUTO: 58.3 %
NONHDLC SERPL-MCNC: 106 MG/DL (CALC)
PLATELET # BLD AUTO: 264 THOUSAND/UL (ref 140–400)
PMV BLD REES-ECKER: 10.8 FL (ref 7.5–12.5)
POTASSIUM SERPL-SCNC: 4.7 MMOL/L (ref 3.5–5.3)
PROT SERPL-MCNC: 6.6 G/DL (ref 6.1–8.1)
RBC # BLD AUTO: 4.87 MILLION/UL (ref 3.8–5.1)
SL AMB EGFR AFRICAN AMERICAN: 77 ML/MIN/1.73M2
SL AMB EGFR NON AFRICAN AMERICAN: 67 ML/MIN/1.73M2
SODIUM SERPL-SCNC: 139 MMOL/L (ref 135–146)
TRIGL SERPL-MCNC: 172 MG/DL
TSH SERPL-ACNC: 1.03 MIU/L (ref 0.4–4.5)
WBC # BLD AUTO: 5.2 THOUSAND/UL (ref 3.8–10.8)

## 2019-06-21 ENCOUNTER — OFFICE VISIT (OUTPATIENT)
Dept: FAMILY MEDICINE CLINIC | Facility: CLINIC | Age: 64
End: 2019-06-21
Payer: COMMERCIAL

## 2019-06-21 VITALS
BODY MASS INDEX: 31.41 KG/M2 | HEART RATE: 69 BPM | HEIGHT: 64 IN | OXYGEN SATURATION: 96 % | SYSTOLIC BLOOD PRESSURE: 140 MMHG | RESPIRATION RATE: 16 BRPM | DIASTOLIC BLOOD PRESSURE: 90 MMHG | WEIGHT: 184 LBS | TEMPERATURE: 97.8 F

## 2019-06-21 DIAGNOSIS — E03.9 HYPOTHYROIDISM, UNSPECIFIED TYPE: ICD-10-CM

## 2019-06-21 DIAGNOSIS — E78.5 HYPERLIPIDEMIA, UNSPECIFIED HYPERLIPIDEMIA TYPE: Primary | ICD-10-CM

## 2019-06-21 DIAGNOSIS — I10 ESSENTIAL HYPERTENSION: ICD-10-CM

## 2019-06-21 PROBLEM — N81.6 RECTOCELE: Status: ACTIVE | Noted: 2017-10-05

## 2019-06-21 PROCEDURE — 3008F BODY MASS INDEX DOCD: CPT | Performed by: FAMILY MEDICINE

## 2019-06-21 PROCEDURE — 1036F TOBACCO NON-USER: CPT | Performed by: FAMILY MEDICINE

## 2019-06-21 PROCEDURE — 99214 OFFICE O/P EST MOD 30 MIN: CPT | Performed by: FAMILY MEDICINE

## 2019-06-21 RX ORDER — LEVOTHYROXINE SODIUM 137 UG/1
TABLET ORAL
Qty: 135 TABLET | Refills: 3 | Status: SHIPPED | OUTPATIENT
Start: 2019-06-21 | End: 2019-06-21 | Stop reason: ALTCHOICE

## 2019-06-21 RX ORDER — ATORVASTATIN CALCIUM 20 MG/1
20 TABLET, FILM COATED ORAL DAILY
Qty: 90 TABLET | Refills: 3 | Status: SHIPPED | OUTPATIENT
Start: 2019-06-21 | End: 2020-01-07 | Stop reason: SDUPTHER

## 2019-09-23 ENCOUNTER — OFFICE VISIT (OUTPATIENT)
Dept: FAMILY MEDICINE CLINIC | Facility: CLINIC | Age: 64
End: 2019-09-23
Payer: COMMERCIAL

## 2019-09-23 VITALS
HEIGHT: 64 IN | BODY MASS INDEX: 29.53 KG/M2 | HEART RATE: 64 BPM | DIASTOLIC BLOOD PRESSURE: 84 MMHG | OXYGEN SATURATION: 96 % | WEIGHT: 173 LBS | RESPIRATION RATE: 16 BRPM | SYSTOLIC BLOOD PRESSURE: 140 MMHG | TEMPERATURE: 97 F

## 2019-09-23 DIAGNOSIS — Z12.39 SCREENING FOR MALIGNANT NEOPLASM OF BREAST: ICD-10-CM

## 2019-09-23 DIAGNOSIS — E03.9 HYPOTHYROIDISM, UNSPECIFIED TYPE: ICD-10-CM

## 2019-09-23 DIAGNOSIS — I10 ESSENTIAL HYPERTENSION: ICD-10-CM

## 2019-09-23 DIAGNOSIS — E16.2 HYPOGLYCEMIA: Primary | ICD-10-CM

## 2019-09-23 DIAGNOSIS — Z23 INFLUENZA VACCINE NEEDED: ICD-10-CM

## 2019-09-23 DIAGNOSIS — E78.5 HYPERLIPIDEMIA, UNSPECIFIED HYPERLIPIDEMIA TYPE: ICD-10-CM

## 2019-09-23 PROBLEM — R73.9 HYPERGLYCEMIA: Status: ACTIVE | Noted: 2019-09-23

## 2019-09-23 LAB — SL AMB POCT HEMOGLOBIN AIC: 6.6 (ref ?–6.5)

## 2019-09-23 PROCEDURE — 83036 HEMOGLOBIN GLYCOSYLATED A1C: CPT | Performed by: FAMILY MEDICINE

## 2019-09-23 PROCEDURE — 90686 IIV4 VACC NO PRSV 0.5 ML IM: CPT | Performed by: FAMILY MEDICINE

## 2019-09-23 PROCEDURE — 90471 IMMUNIZATION ADMIN: CPT | Performed by: FAMILY MEDICINE

## 2019-09-23 PROCEDURE — 99213 OFFICE O/P EST LOW 20 MIN: CPT | Performed by: FAMILY MEDICINE

## 2019-09-23 PROCEDURE — 3008F BODY MASS INDEX DOCD: CPT | Performed by: FAMILY MEDICINE

## 2019-09-23 NOTE — ASSESSMENT & PLAN NOTE
Hypoglycemia  I suspect patient is not consume enough calories to keep her blood sugar in normal range  She will try to consume more calories and stick to her weight watchers point allotment for the day    If she continues to experience hyperglycemia she will obtain blood work for insulin level and C-peptide

## 2019-09-23 NOTE — PROGRESS NOTES
FAMILY PRACTICE OFFICE VISIT       NAME: Jo Joseph  AGE: 59 y o  SEX: female       : 1955        MRN: [id number]    DATE: 2019  TIME: 8:32 AM    Assessment and Plan     Problem List Items Addressed This Visit        Endocrine    Hypothyroidism     Hypothyroidism  Patient's latest TSH level was stable on current dose of levothyroxine         Hypoglycemia     Hypoglycemia  I suspect patient is not consume enough calories to keep her blood sugar in normal range  She will try to consume more calories and stick to her weight watchers point allotment for the day  If she continues to experience hyperglycemia she will obtain blood work for insulin level and C-peptide         Relevant Orders    Insulin, fasting    C-peptide       Cardiovascular and Mediastinum    Hypertension       Other    Hyperlipidemia     Hyperlipidemia  Lipid panel stable on current dose of statin therapy           Other Visit Diagnoses     Screening for malignant neoplasm of breast    -  Primary    Relevant Orders    Mammo screening bilateral w 3d & cad          Patient received her annual flu vaccine today  She was also given prescription to obtain mammogram for screening  Chief Complaint     Chief Complaint   Patient presents with    Follow-up     pt is here for 3 mon f/u blood sugars and weight       History of Present Illness     Patient states she has had success losing weight and has lost 11 lb since last office visit  Unfortunately she has had a few episodes of hypoglycemia which she feels fatigued sweats and jitteriness  She does check her blood sugars which show numbers on 1 occasion of 72, and 52 on a separate occasion  Symptoms do improve after patient consumes food  Review of Systems   Review of Systems   Constitutional: Negative  HENT: Negative  Respiratory: Negative  Cardiovascular: Negative  Gastrointestinal: Negative  Genitourinary: Negative  Musculoskeletal: Negative  Neurological: Positive for dizziness  As per HPI       Active Problem List     Patient Active Problem List   Diagnosis    Hyperlipidemia    Hypertension    Hypothyroidism    Onychomycosis    Rectocele    Hyperglycemia    Hypoglycemia       Past Medical History:  Past Medical History:   Diagnosis Date    Diabetes mellitus ([facility] [address]     Disease of thyroid gland     hypo    Hyperlipidemia     Hypertension     Insomnia     Rectocele     Screening for colon cancer        Past Surgical History:  Past Surgical History:   Procedure Laterality Date    CHOLECYSTECTOMY      GASTRECTOMY  2001    ID COLONOSCOPY FLX DX W/COLLJ SPEC WHEN PFRMD N/A 10/31/2017    Procedure: COLONOSCOPY with bx and tatoo at 20cm, polypectomies x2;  Surgeon: Tika Fitzgerald MD;  Location: [facility];   Service: General    SKIN CANCER EXCISION      left neck areal; left upper arm; back       Family History:  Family History   Problem Relation Age of Onset   Valenzuela Dementia Mother     Hypertension Father     Melanoma Son        Social History:  Social History     Socioeconomic History    Marital status: /Civil Union     Spouse name: Not on file    Number of children: 2    Years of education: Not on file    Highest education level: Not on file   Occupational History    Not on file   Social Needs    Financial resource strain: Not on file    Food insecurity:     Worry: Not on file     Inability: Not on file    Transportation needs:     Medical: Not on file     Non-medical: Not on file   Tobacco Use    Smoking status: Never Smoker    Smokeless tobacco: Never Used   Substance and Sexual Activity    Alcohol use: Yes     Comment: social    Drug use: No    Sexual activity: Not on file   Lifestyle    Physical activity:     Days per week: Not on file     Minutes per session: Not on file    Stress: Not on file   Relationships    Social connections:     Talks on phone: Not on file     Gets together: Not on file     Attends Holiness service: Not on file     Active member of club or organization: Not on file     Attends meetings of clubs or organizations: Not on file     Relationship status: Not on file    Intimate partner violence:     Fear of current or ex partner: Not on file     Emotionally abused: Not on file     Physically abused: Not on file     Forced sexual activity: Not on file   Other Topics Concern    Not on file   Social History Narrative    Caffeine use       Objective     Vitals:    09/23/19 0759   BP: 140/84   Pulse: 64   Resp: 16   Temp: (!) 97 °F (36 1 °C)   SpO2: 96%     Wt Readings from Last 3 Encounters:   09/23/19 78 5 kg (173 lb)   06/21/19 83 5 kg (184 lb)   10/31/17 76 2 kg (168 lb)       Physical Exam   Constitutional: She is oriented to person, place, and time  No distress  Cardiovascular:   Regular rate and rhythm with no murmurs   Pulmonary/Chest: Effort normal and breath sounds normal    Lungs are clear to auscultation without wheezes,rales, or rhonchi   Neurological: She is alert and oriented to person, place, and time  No cranial nerve deficit  Psychiatric: She has a normal mood and affect  Her behavior is normal  Judgment and thought content normal      BMI Counseling: Body mass index is 29 7 kg/m²  The BMI is above normal  Nutrition recommendations include reducing portion sizes, decreasing overall calorie intake and 3-5 servings of fruits/vegetables daily  Exercise recommendations include exercising 3-5 times per week    Pertinent Laboratory/Diagnostic Studies:  Lab Results   Component Value Date    BUN 16 06/18/2019    CREATININE 0 91 06/18/2019    CALCIUM 9 3 06/18/2019     06/15/2016    K 4 7 06/18/2019    CO2 30 06/18/2019     06/18/2019     Lab Results   Component Value Date    ALT 28 06/18/2019    AST 19 06/18/2019    ALKPHOS 133 (H) 06/18/2019    BILITOT 0 5 06/15/2016       Lab Results   Component Value Date    WBC 5 2 06/18/2019    HGB 13 8 06/18/2019    HCT 42 6 06/18/2019 MCV 87 5 06/18/2019     06/18/2019       Lab Results   Component Value Date    TSH 1 03 06/18/2019       Lab Results   Component Value Date    CHOL 177 06/15/2016     Lab Results   Component Value Date    TRIG 172 (H) 06/18/2019     Lab Results   Component Value Date    HDL 52 06/18/2019     No results found for: [facility]  Lab Results   Component Value Date    HGBA1C 7 0 (H) 06/15/2016       Results for orders placed or performed in visit on 06/18/19   Lipid panel   Result Value Ref Range    Total Cholesterol 158 <200 mg/dL    HDL 52 >50 mg/dL    Triglycerides 172 (H) <150 mg/dL    LDL Direct 79 mg/dL (calc)    Chol HDLC Ratio 3 0 <5 0 (calc)    Non-HDL Cholesterol 106 <130 mg/dL (calc)   Comprehensive metabolic panel   Result Value Ref Range    Glucose, Random 122 (H) 65 - 99 mg/dL    BUN 16 7 - 25 mg/dL    Creatinine 0 91 0 50 - 0 99 mg/dL    eGFR Non  67 > OR = 60 mL/min/1 73m2    eGFR  77 > OR = 60 mL/min/1 73m2    SL AMB BUN/CREATININE RATIO NOT APPLICABLE 6 - 22 (calc)    Sodium 139 135 - 146 mmol/L    Potassium 4 7 3 5 - 5 3 mmol/L    Chloride 104 98 - 110 mmol/L    CO2 30 20 - 32 mmol/L    SL AMB CALCIUM 9 3 8 6 - 10 4 mg/dL    Protein, Total 6 6 6 1 - 8 1 g/dL    Albumin 4 3 3 6 - 5 1 g/dL    Globulin 2 3 1 9 - 3 7 g/dL (calc)    Albumin/Globulin Ratio 1 9 1 0 - 2 5 (calc)    TOTAL BILIRUBIN 0 5 0 2 - 1 2 mg/dL    Alkaline Phosphatase 133 (H) 33 - 130 U/L    AST 19 10 - 35 U/L    ALT 28 6 - 29 U/L   CBC and differential   Result Value Ref Range    White Blood Cell Count 5 2 3 8 - 10 8 Thousand/uL    Red Blood Cell Count 4 87 3 80 - 5 10 Million/uL    Hemoglobin 13 8 11 7 - 15 5 g/dL    HCT 42 6 35 0 - 45 0 %    MCV 87 5 80 0 - 100 0 fL    MCH 28 3 27 0 - 33 0 pg    MCHC 32 4 32 0 - 36 0 g/dL    RDW 13 8 11 0 - 15 0 %    Platelet Count 835 320 - 400 Thousand/uL    SL AMB MPV 10 8 7 5 - 12 5 fL    Neutrophils (Absolute) 3,032 1,500 - 7,800 cells/uL    Lymphocytes (Absolute) 1,732 850 - 3,900 cells/uL    Monocytes (Absolute) 338 200 - 950 cells/uL    Eosinophils (Absolute) 68 15 - 500 cells/uL    Basophils ABS 31 0 - 200 cells/uL    Neutrophils 58 3 %    Lymphocytes 33 3 %    Monocytes 6 5 %    Eosinophils 1 3 %    Basophils PCT 0 6 %   TSH, 3rd generation   Result Value Ref Range    TSH 1 03 0 40 - 4 50 mIU/L       Orders Placed This Encounter   Procedures    Mammo screening bilateral w 3d & cad    Insulin, fasting    C-peptide       ALLERGIES:  No Known Allergies    Current Medications     Current Outpatient Medications   Medication Sig Dispense Refill    atorvastatin (LIPITOR) 20 mg tablet Take 1 tablet (20 mg total) by mouth daily 90 tablet 3    levothyroxine 137 mcg tablet TAKE 1 TABLET DAILY 90 tablet 2     No current facility-administered medications for this visit            Health Maintenance     Health Maintenance   Topic Date Due    Hepatitis C Screening  1955    BMI: Followup Plan  03/09/1973    INFLUENZA VACCINE  07/01/2019    MAMMOGRAM  09/29/2019    DTaP,Tdap,and Td Vaccines (1 - Tdap) 06/20/2020 (Originally 3/9/1976)    Pneumococcal Vaccine: 65+ Years (1 of 2 - PCV13) 03/09/2020    Depression Screening PHQ  06/21/2020    BMI: Adult  09/23/2020    PAP SMEAR  10/28/2021    CRC Screening: Colonoscopy  10/31/2022    Pneumococcal Vaccine: Pediatrics (0 to 5 Years) and At-Risk Patients (6 to 59 Years)  Aged Out    HEPATITIS B VACCINES  Aged [employer] History   Administered Date(s) Administered    Influenza Quadrivalent Preservative Free 3 years and older IM 11/24/2015, 09/25/2017    Influenza TIV (IM) 01/07/2013, 12/30/2013    Influenza, injectable, quadrivalent, preservative free 0 5 mL 93/70/8319       Chika Hong MD

## 2019-10-07 ENCOUNTER — HOSPITAL ENCOUNTER (OUTPATIENT)
Dept: BONE DENSITY | Facility: IMAGING CENTER | Age: 64
Discharge: HOME | End: 2019-10-07
Payer: COMMERCIAL

## 2019-10-07 VITALS — WEIGHT: 170 LBS | BODY MASS INDEX: 29.02 KG/M2 | HEIGHT: 64 IN

## 2019-10-07 DIAGNOSIS — Z12.39 SCREENING FOR MALIGNANT NEOPLASM OF BREAST: ICD-10-CM

## 2019-10-07 PROCEDURE — 77063 BREAST TOMOSYNTHESIS BI: CPT

## 2019-10-07 PROCEDURE — 77067 SCR MAMMO BI INCL CAD: CPT

## 2019-12-23 ENCOUNTER — OFFICE VISIT (OUTPATIENT)
Dept: FAMILY MEDICINE CLINIC | Facility: CLINIC | Age: 64
End: 2019-12-23
Payer: COMMERCIAL

## 2019-12-23 VITALS
SYSTOLIC BLOOD PRESSURE: 130 MMHG | DIASTOLIC BLOOD PRESSURE: 70 MMHG | BODY MASS INDEX: 27.49 KG/M2 | HEART RATE: 68 BPM | RESPIRATION RATE: 16 BRPM | WEIGHT: 161 LBS | TEMPERATURE: 96.3 F | HEIGHT: 64 IN

## 2019-12-23 DIAGNOSIS — E03.9 HYPOTHYROIDISM, UNSPECIFIED TYPE: Primary | ICD-10-CM

## 2019-12-23 DIAGNOSIS — I10 ESSENTIAL HYPERTENSION: ICD-10-CM

## 2019-12-23 DIAGNOSIS — E11.9 TYPE 2 DIABETES MELLITUS WITHOUT COMPLICATION, WITHOUT LONG-TERM CURRENT USE OF INSULIN (HCC): ICD-10-CM

## 2019-12-23 PROBLEM — R73.9 HYPERGLYCEMIA: Status: RESOLVED | Noted: 2019-09-23 | Resolved: 2019-12-23

## 2019-12-23 PROCEDURE — 3075F SYST BP GE 130 - 139MM HG: CPT | Performed by: FAMILY MEDICINE

## 2019-12-23 PROCEDURE — 3078F DIAST BP <80 MM HG: CPT | Performed by: FAMILY MEDICINE

## 2019-12-23 PROCEDURE — 99213 OFFICE O/P EST LOW 20 MIN: CPT | Performed by: FAMILY MEDICINE

## 2019-12-23 PROCEDURE — 3008F BODY MASS INDEX DOCD: CPT | Performed by: FAMILY MEDICINE

## 2019-12-23 PROCEDURE — 1036F TOBACCO NON-USER: CPT | Performed by: FAMILY MEDICINE

## 2019-12-23 NOTE — PROGRESS NOTES
FAMILY PRACTICE OFFICE VISIT       NAME: Lela Lou  AGE: 59 y o  SEX: female       : 1955        MRN: [id number]    DATE: 2019  TIME: 8:25 AM    Assessment and Plan     Problem List Items Addressed This Visit        Endocrine    Hypothyroidism - Primary     Hypothyroidism  TSH is stable on current dose of levothyroxine         Type 2 diabetes mellitus ([facility] [address] )     Diabetes  Patient with a previous A1c of 6 6  She will obtain blood work as ordered to recheck this value  She has done well with weight loss and blood sugar control with diet and exercise  Lab Results   Component Value Date    HGBA1C 6 6 (A) 2019            Relevant Orders    Comprehensive metabolic panel    Lipid panel    Hemoglobin A1C    TSH, 3rd generation       Cardiovascular and Mediastinum    RESOLVED: Hypertension              Chief Complaint     Chief Complaint   Patient presents with    Follow-up       History of Present Illness     Patient states she continues to lose weight with diet and exercise  She has had an average of 1 day out of the week where she may have feelings of hypoglycemia with blood sugars in 60s or 70s  Symptoms do resolve with eating something which sugar  Refers she does have breakfast lunch and dinner as well as a nighttime snack  She denies any recent illness  Review of Systems   Review of Systems   Constitutional: Negative  HENT: Negative  Respiratory: Negative  Cardiovascular: Negative  Gastrointestinal: Negative  Genitourinary: Negative  Musculoskeletal: Negative  Neurological: Negative  Psychiatric/Behavioral: Negative          Active Problem List     Patient Active Problem List   Diagnosis    Hyperlipidemia    Hypothyroidism    Onychomycosis    Rectocele    Hypoglycemia    Type 2 diabetes mellitus ([facility] [address] )       Past Medical History:  Past Medical History:   Diagnosis Date    Diabetes mellitus ([facility] [address] )     Disease of thyroid gland     hypo    Hyperlipidemia     Hypertension     Insomnia     Rectocele     Screening for colon cancer     Skin cancer        Past Surgical History:  Past Surgical History:   Procedure Laterality Date    CHOLECYSTECTOMY      GASTRECTOMY  2001    ME COLONOSCOPY FLX DX W/COLLJ SPEC WHEN PFRMD N/A 10/31/2017    Procedure: COLONOSCOPY with bx and tatoo at 20cm, polypectomies x2;  Surgeon: Ahsan Savage MD;  Location: [facility];   Service: General    SKIN CANCER EXCISION      left neck areal; left upper arm; back       Family History:  Family History   Problem Relation Age of Onset    Dementia Mother     Hypertension Father     Melanoma Son     No Known Problems Sister     No Known Problems Daughter     No Known Problems Maternal Grandmother     No Known Problems Maternal Grandfather     No Known Problems Paternal Grandmother     No Known Problems Paternal Grandfather     No Known Problems Sister     No Known Problems Sister     No Known Problems Sister     No Known Problems Maternal Aunt     No Known Problems Maternal Aunt     No Known Problems Paternal Aunt        Social History:  Social History     Socioeconomic History    Marital status: /Civil Union     Spouse name: Not on file    Number of children: 2    Years of education: Not on file    Highest education level: Not on file   Occupational History    Not on file   Social Needs    Financial resource strain: Not on file    Food insecurity:     Worry: Not on file     Inability: Not on file    Transportation needs:     Medical: Not on file     Non-medical: Not on file   Tobacco Use    Smoking status: Never Smoker    Smokeless tobacco: Never Used   Substance and Sexual Activity    Alcohol use: Yes     Comment: social    Drug use: No    Sexual activity: Not on file   Lifestyle    Physical activity:     Days per week: Not on file     Minutes per session: Not on file    Stress: Not on file   Relationships    Social connections:     Talks on phone: Not on file     Gets together: Not on file     Attends Denominational service: Not on file     Active member of club or organization: Not on file     Attends meetings of clubs or organizations: Not on file     Relationship status: Not on file    Intimate partner violence:     Fear of current or ex partner: Not on file     Emotionally abused: Not on file     Physically abused: Not on file     Forced sexual activity: Not on file   Other Topics Concern    Not on file   Social History Narrative    Caffeine use       Objective     Vitals:    12/23/19 0757   BP: 130/70   Pulse: 68   Resp: 16   Temp: (!) 96 3 °F (35 7 °C)     Wt Readings from Last 3 Encounters:   12/23/19 73 kg (161 lb)   10/07/19 77 1 kg (170 lb)   09/23/19 78 5 kg (173 lb)       Physical Exam   Constitutional: She is oriented to person, place, and time  No distress  Cardiovascular: Normal rate, regular rhythm and normal heart sounds  No murmur heard  Pulmonary/Chest: Effort normal and breath sounds normal  No respiratory distress  She has no rales  Abdominal:   Abdomen is soft, nontender with positive bowel sounds  There is no rebound or guarding  No masses palpated   Musculoskeletal: She exhibits no edema  Neurological: She is alert and oriented to person, place, and time  No cranial nerve deficit  Psychiatric: She has a normal mood and affect   Her behavior is normal  Judgment and thought content normal       Pertinent Laboratory/Diagnostic Studies:  Lab Results   Component Value Date    BUN 16 06/18/2019    CREATININE 0 91 06/18/2019    CALCIUM 9 3 06/18/2019     06/15/2016    K 4 7 06/18/2019    CO2 30 06/18/2019     06/18/2019     Lab Results   Component Value Date    ALT 28 06/18/2019    AST 19 06/18/2019    ALKPHOS 133 (H) 06/18/2019    BILITOT 0 5 06/15/2016       Lab Results   Component Value Date    WBC 5 2 06/18/2019    HGB 13 8 06/18/2019    HCT 42 6 06/18/2019    MCV 87 5 06/18/2019     06/18/2019 Lab Results   Component Value Date    TSH 1 03 06/18/2019       Lab Results   Component Value Date    CHOL 177 06/15/2016     Lab Results   Component Value Date    TRIG 172 (H) 06/18/2019     Lab Results   Component Value Date    HDL 52 06/18/2019     No results found for: [facility]  Lab Results   Component Value Date    HGBA1C 6 6 (A) 09/23/2019       Results for orders placed or performed in visit on 09/23/19   POCT hemoglobin A1c   Result Value Ref Range    Hemoglobin A1C 6 6 (A) 6 5       Orders Placed This Encounter   Procedures    Comprehensive metabolic panel    Lipid panel    Hemoglobin A1C    TSH, 3rd generation       ALLERGIES:  No Known Allergies    Current Medications     Current Outpatient Medications   Medication Sig Dispense Refill    atorvastatin (LIPITOR) 20 mg tablet Take 1 tablet (20 mg total) by mouth daily 90 tablet 3    levothyroxine 137 mcg tablet TAKE 1 TABLET DAILY 90 tablet 2    Multiple Vitamins-Minerals (MULTIVITAMIN WOMEN 50+ PO) Take 1 caplet by mouth daily       No current facility-administered medications for this visit            Health Maintenance     Health Maintenance   Topic Date Due    Hepatitis C Screening  1955    Diabetic Foot Exam  03/09/1965    URINE MICROALBUMIN  03/09/1965    HIV Screening  03/09/1970    DM Eye Exam  12/02/2016    DTaP,Tdap,and Td Vaccines (1 - Tdap) 06/20/2020 (Originally 3/9/1966)    Pneumococcal Vaccine: 65+ Years (1 of 2 - PCV13) 03/09/2020    HEMOGLOBIN A1C  03/23/2020    BMI: Followup Plan  09/23/2020    Depression Screening PHQ  12/23/2020    BMI: Adult  12/23/2020    MAMMOGRAM  10/07/2021    Cervical Cancer Screening  10/28/2021    CRC Screening: Colonoscopy  10/31/2022    Influenza Vaccine  Completed    Pneumococcal Vaccine: Pediatrics (0 to 5 Years) and At-Risk Patients (6 to 59 Years)  Aged Out    HIB Vaccine  Aged Out    Hepatitis B Vaccine  Aged Out    IPV Vaccine  Aged Out    Hepatitis A Vaccine  Aged Chris  Meningococcal ACWY Vaccine  Aged Out    HPV Vaccine  Aged Out     Immunization History   Administered Date(s) Administered    Influenza Quadrivalent Preservative Free 3 years and older IM 11/24/2015, 09/25/2017    Influenza TIV (IM) 01/07/2013, 12/30/2013    Influenza, injectable, quadrivalent, preservative free 0 5 mL 10/15/2018, 70/84/4468       Leta Quintanilla MD

## 2019-12-23 NOTE — ASSESSMENT & PLAN NOTE
Diabetes  Patient with a previous A1c of 6 6  She will obtain blood work as ordered to recheck this value  She has done well with weight loss and blood sugar control with diet and exercise    Lab Results   Component Value Date    HGBA1C 6 6 (A) 09/23/2019

## 2020-01-07 DIAGNOSIS — E78.5 HYPERLIPIDEMIA, UNSPECIFIED HYPERLIPIDEMIA TYPE: ICD-10-CM

## 2020-01-07 RX ORDER — ATORVASTATIN CALCIUM 20 MG/1
20 TABLET, FILM COATED ORAL DAILY
Qty: 90 TABLET | Refills: 3 | Status: SHIPPED | OUTPATIENT
Start: 2020-01-07 | End: 2022-04-06

## 2020-01-08 DIAGNOSIS — E03.9 HYPOTHYROIDISM, UNSPECIFIED TYPE: ICD-10-CM

## 2020-01-09 RX ORDER — LEVOTHYROXINE SODIUM 137 UG/1
137 TABLET ORAL DAILY
Qty: 90 TABLET | Refills: 2 | Status: SHIPPED | OUTPATIENT
Start: 2020-01-09 | End: 2020-04-13 | Stop reason: SDUPTHER

## 2020-03-13 ENCOUNTER — TELEPHONE (OUTPATIENT)
Dept: FAMILY MEDICINE CLINIC | Facility: CLINIC | Age: 65
End: 2020-03-13

## 2020-03-13 LAB
ALBUMIN SERPL-MCNC: 4.2 G/DL (ref 3.6–5.1)
ALBUMIN/GLOB SERPL: 1.8 (CALC) (ref 1–2.5)
ALP SERPL-CCNC: 117 U/L (ref 37–153)
ALT SERPL-CCNC: 26 U/L (ref 6–29)
AST SERPL-CCNC: 22 U/L (ref 10–35)
BILIRUB SERPL-MCNC: 0.6 MG/DL (ref 0.2–1.2)
BUN SERPL-MCNC: 18 MG/DL (ref 7–25)
BUN/CREAT SERPL: NORMAL (CALC) (ref 6–22)
CALCIUM SERPL-MCNC: 9.5 MG/DL (ref 8.6–10.4)
CHLORIDE SERPL-SCNC: 104 MMOL/L (ref 98–110)
CHOLEST SERPL-MCNC: 157 MG/DL
CHOLEST/HDLC SERPL: 2.6 (CALC)
CO2 SERPL-SCNC: 31 MMOL/L (ref 20–32)
CREAT SERPL-MCNC: 0.97 MG/DL (ref 0.5–0.99)
GLOBULIN SER CALC-MCNC: 2.3 G/DL (CALC) (ref 1.9–3.7)
GLUCOSE SERPL-MCNC: 88 MG/DL (ref 65–99)
HBA1C MFR BLD: 6.1 % OF TOTAL HGB
HDLC SERPL-MCNC: 61 MG/DL
LDLC SERPL CALC-MCNC: 77 MG/DL (CALC)
NONHDLC SERPL-MCNC: 96 MG/DL (CALC)
POTASSIUM SERPL-SCNC: 4.6 MMOL/L (ref 3.5–5.3)
PROT SERPL-MCNC: 6.5 G/DL (ref 6.1–8.1)
SL AMB EGFR AFRICAN AMERICAN: 71 ML/MIN/1.73M2
SL AMB EGFR NON AFRICAN AMERICAN: 61 ML/MIN/1.73M2
SODIUM SERPL-SCNC: 142 MMOL/L (ref 135–146)
TRIGL SERPL-MCNC: 109 MG/DL
TSH SERPL-ACNC: 7.5 MIU/L (ref 0.4–4.5)

## 2020-03-13 NOTE — TELEPHONE ENCOUNTER
Patient was wondering if she can come off of her cholesterol medication from the results of her blood work that was done  Please notify patient at [phone]

## 2020-03-13 NOTE — TELEPHONE ENCOUNTER
She may give herself a trial of discontinuing medication which should have repeat tests in 6 months since naturally it will tend to elevate when she stops medications

## 2020-04-13 ENCOUNTER — TELEPHONE (OUTPATIENT)
Dept: FAMILY MEDICINE CLINIC | Facility: CLINIC | Age: 65
End: 2020-04-13

## 2020-04-13 DIAGNOSIS — E03.9 HYPOTHYROIDISM, UNSPECIFIED TYPE: ICD-10-CM

## 2020-04-13 RX ORDER — LEVOTHYROXINE SODIUM 137 UG/1
137 TABLET ORAL DAILY
Qty: 90 TABLET | Refills: 1 | Status: SHIPPED | OUTPATIENT
Start: 2020-04-13 | End: 2020-04-13 | Stop reason: SDUPTHER

## 2020-04-13 RX ORDER — LEVOTHYROXINE SODIUM 175 UG/1
175 TABLET ORAL DAILY
Qty: 90 TABLET | Refills: 1 | Status: SHIPPED | OUTPATIENT
Start: 2020-04-13 | End: 2020-09-28

## 2020-09-28 DIAGNOSIS — E03.9 HYPOTHYROIDISM, UNSPECIFIED TYPE: ICD-10-CM

## 2020-09-28 RX ORDER — LEVOTHYROXINE SODIUM 175 UG/1
TABLET ORAL
Qty: 90 TABLET | Refills: 1 | Status: SHIPPED | OUTPATIENT
Start: 2020-09-28 | End: 2021-04-13

## 2021-03-10 DIAGNOSIS — Z23 ENCOUNTER FOR IMMUNIZATION: ICD-10-CM

## 2021-03-17 LAB
LEFT EYE DIABETIC RETINOPATHY: NORMAL
RIGHT EYE DIABETIC RETINOPATHY: NORMAL

## 2021-03-24 ENCOUNTER — IMMUNIZATIONS (OUTPATIENT)
Dept: FAMILY MEDICINE CLINIC | Facility: HOSPITAL | Age: 66
End: 2021-03-24

## 2021-03-24 DIAGNOSIS — Z23 ENCOUNTER FOR IMMUNIZATION: Primary | ICD-10-CM

## 2021-03-24 PROCEDURE — 91300 SARS-COV-2 / COVID-19 MRNA VACCINE (PFIZER-BIONTECH) 30 MCG: CPT

## 2021-03-24 PROCEDURE — 0001A SARS-COV-2 / COVID-19 MRNA VACCINE (PFIZER-BIONTECH) 30 MCG: CPT

## 2021-04-02 ENCOUNTER — TELEPHONE (OUTPATIENT)
Dept: FAMILY MEDICINE CLINIC | Facility: CLINIC | Age: 66
End: 2021-04-02

## 2021-04-02 DIAGNOSIS — E03.9 HYPOTHYROIDISM, UNSPECIFIED TYPE: ICD-10-CM

## 2021-04-02 DIAGNOSIS — E11.9 TYPE 2 DIABETES MELLITUS WITHOUT COMPLICATION, WITHOUT LONG-TERM CURRENT USE OF INSULIN (HCC): Primary | ICD-10-CM

## 2021-04-02 DIAGNOSIS — E78.5 HYPERLIPIDEMIA, UNSPECIFIED HYPERLIPIDEMIA TYPE: ICD-10-CM

## 2021-04-02 NOTE — TELEPHONE ENCOUNTER
I will place order in [facility] and patient may obtain blood work prior to appointment

## 2021-04-08 ENCOUNTER — APPOINTMENT (OUTPATIENT)
Dept: LAB | Facility: CLINIC | Age: 66
End: 2021-04-08
Payer: COMMERCIAL

## 2021-04-08 DIAGNOSIS — E11.9 TYPE 2 DIABETES MELLITUS WITHOUT COMPLICATION, WITHOUT LONG-TERM CURRENT USE OF INSULIN (HCC): ICD-10-CM

## 2021-04-08 DIAGNOSIS — E03.9 HYPOTHYROIDISM, UNSPECIFIED TYPE: ICD-10-CM

## 2021-04-08 DIAGNOSIS — E78.5 HYPERLIPIDEMIA, UNSPECIFIED HYPERLIPIDEMIA TYPE: ICD-10-CM

## 2021-04-08 LAB
ALBUMIN SERPL BCP-MCNC: 3.7 G/DL (ref 3.5–5)
ALP SERPL-CCNC: 104 U/L (ref 46–116)
ALT SERPL W P-5'-P-CCNC: 44 U/L (ref 12–78)
ANION GAP SERPL CALCULATED.3IONS-SCNC: 4 MMOL/L (ref 4–13)
AST SERPL W P-5'-P-CCNC: 29 U/L (ref 5–45)
BILIRUB SERPL-MCNC: 0.44 MG/DL (ref 0.2–1)
BUN SERPL-MCNC: 12 MG/DL (ref 5–25)
CALCIUM SERPL-MCNC: 9.1 MG/DL (ref 8.3–10.1)
CHLORIDE SERPL-SCNC: 107 MMOL/L (ref 100–108)
CHOLEST SERPL-MCNC: 181 MG/DL (ref 50–200)
CO2 SERPL-SCNC: 29 MMOL/L (ref 21–32)
CREAT SERPL-MCNC: 0.82 MG/DL (ref 0.6–1.3)
ERYTHROCYTE [DISTWIDTH] IN BLOOD BY AUTOMATED COUNT: 15.7 % (ref 11.6–15.1)
EST. AVERAGE GLUCOSE BLD GHB EST-MCNC: 120 MG/DL
GFR SERPL CREATININE-BSD FRML MDRD: 75 ML/MIN/1.73SQ M
GLUCOSE P FAST SERPL-MCNC: 79 MG/DL (ref 65–99)
HBA1C MFR BLD: 5.8 %
HCT VFR BLD AUTO: 44.5 % (ref 34.8–46.1)
HDLC SERPL-MCNC: 65 MG/DL
HGB BLD-MCNC: 13.9 G/DL (ref 11.5–15.4)
LDLC SERPL CALC-MCNC: 98 MG/DL (ref 0–100)
MCH RBC QN AUTO: 29.5 PG (ref 26.8–34.3)
MCHC RBC AUTO-ENTMCNC: 31.2 G/DL (ref 31.4–37.4)
MCV RBC AUTO: 95 FL (ref 82–98)
NONHDLC SERPL-MCNC: 116 MG/DL
PLATELET # BLD AUTO: 247 THOUSANDS/UL (ref 149–390)
PMV BLD AUTO: 11.5 FL (ref 8.9–12.7)
POTASSIUM SERPL-SCNC: 4.1 MMOL/L (ref 3.5–5.3)
PROT SERPL-MCNC: 7 G/DL (ref 6.4–8.2)
RBC # BLD AUTO: 4.71 MILLION/UL (ref 3.81–5.12)
SODIUM SERPL-SCNC: 140 MMOL/L (ref 136–145)
TRIGL SERPL-MCNC: 91 MG/DL
TSH SERPL DL<=0.05 MIU/L-ACNC: 1.61 UIU/ML (ref 0.36–3.74)
WBC # BLD AUTO: 4.45 THOUSAND/UL (ref 4.31–10.16)

## 2021-04-08 PROCEDURE — 85027 COMPLETE CBC AUTOMATED: CPT

## 2021-04-08 PROCEDURE — 84443 ASSAY THYROID STIM HORMONE: CPT

## 2021-04-08 PROCEDURE — 36415 COLL VENOUS BLD VENIPUNCTURE: CPT

## 2021-04-08 PROCEDURE — 80053 COMPREHEN METABOLIC PANEL: CPT

## 2021-04-08 PROCEDURE — 83036 HEMOGLOBIN GLYCOSYLATED A1C: CPT

## 2021-04-08 PROCEDURE — 80061 LIPID PANEL: CPT

## 2021-04-12 ENCOUNTER — OFFICE VISIT (OUTPATIENT)
Dept: FAMILY MEDICINE CLINIC | Facility: CLINIC | Age: 66
End: 2021-04-12
Payer: COMMERCIAL

## 2021-04-12 VITALS
RESPIRATION RATE: 16 BRPM | SYSTOLIC BLOOD PRESSURE: 140 MMHG | DIASTOLIC BLOOD PRESSURE: 80 MMHG | BODY MASS INDEX: 28.89 KG/M2 | OXYGEN SATURATION: 97 % | HEART RATE: 70 BPM | TEMPERATURE: 97.6 F | WEIGHT: 169.2 LBS | HEIGHT: 64 IN

## 2021-04-12 DIAGNOSIS — E78.5 HYPERLIPIDEMIA, UNSPECIFIED HYPERLIPIDEMIA TYPE: ICD-10-CM

## 2021-04-12 DIAGNOSIS — R10.13 EPIGASTRIC PAIN: ICD-10-CM

## 2021-04-12 DIAGNOSIS — E11.9 TYPE 2 DIABETES MELLITUS WITHOUT COMPLICATION, WITHOUT LONG-TERM CURRENT USE OF INSULIN (HCC): Primary | ICD-10-CM

## 2021-04-12 DIAGNOSIS — E03.9 HYPOTHYROIDISM, UNSPECIFIED TYPE: ICD-10-CM

## 2021-04-12 DIAGNOSIS — Z00.00 PERIODIC HEALTH ASSESSMENT, GENERAL SCREENING, ADULT: ICD-10-CM

## 2021-04-12 LAB
CREAT UR-MCNC: 40.9 MG/DL
MICROALBUMIN UR-MCNC: 6.2 MG/L (ref 0–20)
MICROALBUMIN/CREAT 24H UR: 15 MG/G CREATININE (ref 0–30)
SL AMB POCT HEMOGLOBIN AIC: 5.6 (ref ?–6.5)

## 2021-04-12 PROCEDURE — 3288F FALL RISK ASSESSMENT DOCD: CPT | Performed by: FAMILY MEDICINE

## 2021-04-12 PROCEDURE — 82043 UR ALBUMIN QUANTITATIVE: CPT | Performed by: FAMILY MEDICINE

## 2021-04-12 PROCEDURE — 83036 HEMOGLOBIN GLYCOSYLATED A1C: CPT | Performed by: FAMILY MEDICINE

## 2021-04-12 PROCEDURE — 82570 ASSAY OF URINE CREATININE: CPT | Performed by: FAMILY MEDICINE

## 2021-04-12 PROCEDURE — 99214 OFFICE O/P EST MOD 30 MIN: CPT | Performed by: FAMILY MEDICINE

## 2021-04-12 PROCEDURE — G0439 PPPS, SUBSEQ VISIT: HCPCS | Performed by: FAMILY MEDICINE

## 2021-04-12 NOTE — ASSESSMENT & PLAN NOTE
Diabetes  Patient's A1c is stable with current dieting  Her foot exam is up-to-date    Lab Results   Component Value Date    HGBA1C 5 6 04/12/2021

## 2021-04-12 NOTE — PROGRESS NOTES
Assessment and Plan:     Problem List Items Addressed This Visit        Endocrine    Type 2 diabetes mellitus ([facility] ) - Primary    Relevant Orders    Microalbumin / creatinine urine ratio    POCT hemoglobin A1c      Other Visit Diagnoses     Periodic health assessment, general screening, adult        Relevant Orders    Mammo screening bilateral w 3d & cad           Preventive health issues were discussed with patient, and age appropriate screening tests were ordered as noted in patient's After Visit Summary  Personalized health advice and appropriate referrals for health education or preventive services given if needed, as noted in patient's After Visit Summary  History of Present Illness:     Patient presents for Medicare Annual Wellness visit    Patient Care Team:  Stephan Correa MD as PCP - Dayne Lopez MD as PCP - [address] [place], [place] [address] ([facility])  Rubén Hughes MD Nadyne Constant, MD as Endoscopist     Problem List:     Patient Active Problem List   Diagnosis    Hyperlipidemia    Hypothyroidism    Onychomycosis    Rectocele    Hypoglycemia    Type 2 diabetes mellitus ([facility] [address] )      Past Medical and Surgical History:     Past Medical History:   Diagnosis Date    Diabetes mellitus ([place] [address] )     Disease of thyroid gland     hypo    Hyperlipidemia     Hypertension     Insomnia     Rectocele     Screening for colon cancer     Skin cancer      Past Surgical History:   Procedure Laterality Date    CHOLECYSTECTOMY      GASTRECTOMY  2001    [place] COLONOSCOPY FLX DX W/COLLJ SPEC WHEN PFRMD N/A 10/31/2017    Procedure: COLONOSCOPY with bx and tatoo at 20cm, polypectomies x2;  Surgeon: Miladys Juan MD;  Location: [facility];   Service: General    SKIN CANCER EXCISION      left neck areal; left upper arm; back      Family History:     Family History   Problem Relation Age of Onset    Dementia Mother     Hypertension Father     Melanoma Son     No Known Problems Sister     No Known Problems Daughter     No Known Problems Maternal Grandmother     No Known Problems Maternal Grandfather     No Known Problems Paternal Grandmother     No Known Problems Paternal Grandfather     No Known Problems Sister     No Known Problems Sister     No Known Problems Sister     No Known Problems Maternal Aunt     No Known Problems Maternal Aunt     No Known Problems Paternal Aunt       Social History:     E-Cigarette/Vaping    E-Cigarette Use Never User      E-Cigarette/Vaping Substances    Nicotine No     THC No     CBD No     Flavoring No     Other No     Unknown No      Social History     Socioeconomic History    Marital status: /Civil Union     Spouse name: None    Number of children: 2    Years of education: None    Highest education level: None   Occupational History    None   Social Needs    Financial resource strain: None    Food insecurity     Worry: None     Inability: None    Transportation needs     Medical: None     Non-medical: None   Tobacco Use    Smoking status: Never Smoker    Smokeless tobacco: Never Used   Substance and Sexual Activity    Alcohol use: Yes     Comment: social    Drug use: No    Sexual activity: None   Lifestyle    Physical activity     Days per week: None     Minutes per session: None    Stress: None   Relationships    Social connections     Talks on phone: None     Gets together: None     Attends Hindu service: None     Active member of club or organization: None     Attends meetings of clubs or organizations: None     Relationship status: None    Intimate partner violence     Fear of current or ex partner: None     Emotionally abused: None     Physically abused: None     Forced sexual activity: None   Other Topics Concern    None   Social History Narrative    Caffeine use      Medications and Allergies:     Current Outpatient Medications   Medication Sig Dispense Refill    atorvastatin (LIPITOR) 20 mg tablet Take 1 tablet (20 mg total) by mouth daily 90 tablet 3    levothyroxine 175 mcg tablet take 1 tablet by mouth once daily 90 tablet 1    Multiple Vitamins-Minerals (MULTIVITAMIN WOMEN 50+ PO) Take 1 caplet by mouth daily       No current facility-administered medications for this visit        No Known Allergies   Immunizations:     Immunization History   Administered Date(s) Administered    INFLUENZA 10/05/2020    Influenza Quadrivalent Preservative Free 3 years and older IM 11/24/2015, 09/25/2017    Influenza, injectable, quadrivalent, preservative free 0 5 mL 10/15/2018, 09/23/2019    Influenza, seasonal, injectable 01/07/2013, 12/30/2013    SARS-CoV-2 / COVID-19 mRNA IM (Pfizer-BioNTech) 03/24/2021      Health Maintenance:         Topic Date Due    Hepatitis C Screening  Never done    MAMMOGRAM  10/07/2021    Cervical Cancer Screening  10/28/2021    Colonoscopy Surveillance  10/31/2022    Colorectal Cancer Screening  10/31/2027         Topic Date Due    DTaP,Tdap,and Td Vaccines (1 - Tdap) Never done    Pneumococcal Vaccine: 65+ Years (1 of 1 - PPSV23) Never done    COVID-19 Vaccine (2 - Pfizer 2-dose series) 04/14/2021      Medicare Health Risk Assessment:     /80 (BP Location: Left arm, Patient Position: Sitting, Cuff Size: Adult)   Pulse 70   Temp 97 6 °F (36 4 °C) (Temporal)   Resp 16   Ht 5' 4" (1 626 m)   Wt 76 7 kg (169 lb 3 2 oz)   SpO2 97%   BMI 29 04 kg/m²          Depression Screening:   PHQ-2 Score: 0      PREVENTIVE SCREENINGS      Cardiovascular Screening:    General: Screening Not Indicated and History Lipid Disorder      Diabetes Screening:     General: Screening Not Indicated and History Diabetes      Colorectal Cancer Screening:     General: Screening Current      Breast Cancer Screening:     General: Screening Current      Cervical Cancer Screening:    General: Screening Not Indicated      Lung Cancer Screening:     General: Screening Not Indicated      Sandra Wilkes MD

## 2021-04-12 NOTE — ASSESSMENT & PLAN NOTE
Epigastric pain  Patient was given recommendation to try over-the-counter famotidine 20 mg 1 p o  q day    If symptoms persist after 2 weeks she was encouraged to call the office for referral to gastroenterologist for further evaluation

## 2021-04-12 NOTE — PROGRESS NOTES
FAMILY PRACTICE OFFICE VISIT       NAME: Kasandra Vigil  AGE: 77 y o  SEX: female       : 1955        MRN: [id number]    DATE: 2021  TIME: 10:44 AM    Assessment and Plan     Problem List Items Addressed This Visit        Endocrine    Hypothyroidism       Hypothyroidism  Patient's TSH is stable on current dose of levothyroxine         Type 2 diabetes mellitus ([address] ) - Primary      Diabetes  Patient's A1c is stable with current dieting  Her foot exam is up-to-date  Lab Results   Component Value Date    HGBA1C 5 6 2021            Relevant Orders    Microalbumin / creatinine urine ratio    POCT hemoglobin A1c       Other    Hyperlipidemia       Hyperlipidemia  Lipid panel stable on current dose of statin therapy         Epigastric pain      Epigastric pain  Patient was given recommendation to try over-the-counter famotidine 20 mg 1 p o  q day  If symptoms persist after 2 weeks she was encouraged to call the office for referral to gastroenterologist for further evaluation           Other Visit Diagnoses     Periodic health assessment, general screening, adult        Relevant Orders    Mammo screening bilateral w 3d & cad        BMI Counseling: Body mass index is 29 04 kg/m²  The BMI is above normal  Nutrition recommendations include decreasing portion sizes and moderation in carbohydrate intake  Exercise recommendations include exercising 3-5 times per week  Chief Complaint     Chief Complaint   Patient presents with    Annual Exam     foot exam     Medicare Wellness Visit       History of Present Illness       Patient in the office to have evaluation of chronic medical condition  She denies any recent illness  She has been trying to diet to lose weight  She states that for years she has had left abdominal discomfort that occurs on a daily basis  Symptoms improve after eating a sufficient amount of food    In the past she has been on weight watchers diet as well as a keto diet to try and lose weight  Her A1c in the office was stable at 5 6  she had not been obtaining a regular form of exercise in the past due to taking care of her mother with dementia however her mother did pass away in January 2021  Patient will see about being more active this summer with a walking regimen  Review of Systems   Review of Systems   Constitutional: Negative  HENT: Negative  Eyes: Negative  Respiratory: Negative  Cardiovascular: Negative  Gastrointestinal: Positive for abdominal pain  Negative for constipation, diarrhea, nausea and rectal pain  Genitourinary: Negative  Musculoskeletal: Negative  Skin: Negative  Neurological: Negative  Psychiatric/Behavioral: Negative  Active Problem List     Patient Active Problem List   Diagnosis    Hyperlipidemia    Hypothyroidism    Onychomycosis    Rectocele    Hypoglycemia    Type 2 diabetes mellitus ([facility] [address] )    Epigastric pain       Past Medical History:  Past Medical History:   Diagnosis Date    [facility]     Disease of thyroid gland     hypo    Hyperlipidemia     Hypertension     Insomnia     Rectocele     Screening for colon cancer     Skin cancer        Past Surgical History:  Past Surgical History:   Procedure Laterality Date    CHOLECYSTECTOMY      GASTRECTOMY  2001    [place] COLONOSCOPY FLX DX W/COLLJ SPEC WHEN PFRMD N/A 10/31/2017    Procedure: COLONOSCOPY with bx and tatoo at 20cm, polypectomies x2;  Surgeon: Po Fabian MD;  Location: [facility];   Service: General    SKIN CANCER EXCISION      left neck areal; left upper arm; back       Family History:  Family History   Problem Relation Age of Onset    Dementia Mother     Hypertension Father     Melanoma Son     No Known Problems Sister     No Known Problems Daughter     No Known Problems Maternal Grandmother     No Known Problems Maternal Grandfather     No Known Problems Paternal Grandmother     No Known Problems Paternal Grandfather     No Known Problems Sister     No Known Problems Sister     No Known Problems Sister     No Known Problems Maternal Aunt     No Known Problems Maternal Aunt     No Known Problems Paternal Aunt        Social History:  Social History     Socioeconomic History    Marital status: /Civil Union     Spouse name: Not on file    Number of children: 2    Years of education: Not on file    Highest education level: Not on file   Occupational History    Not on file   Social Needs    Financial resource strain: Not on file    Food insecurity     Worry: Not on file     Inability: Not on file   [employer] needs     Medical: Not on file     Non-medical: Not on file   Tobacco Use    Smoking status: Never Smoker    Smokeless tobacco: Never Used   Substance and Sexual Activity    Alcohol use: Yes     Comment: social    Drug use: No    Sexual activity: Not on file   Lifestyle    Physical activity     Days per week: Not on file     Minutes per session: Not on file    Stress: Not on file   Relationships    Social connections     Talks on phone: Not on file     Gets together: Not on file     Attends Worship service: Not on file     Active member of club or organization: Not on file     Attends meetings of clubs or organizations: Not on file     Relationship status: Not on file    Intimate partner violence     Fear of current or ex partner: Not on file     Emotionally abused: Not on file     Physically abused: Not on file     Forced sexual activity: Not on file   Other Topics Concern    Not on file   Social History Narrative    Caffeine use       Objective     Vitals:    04/12/21 0842   BP: 140/80   Pulse: 70   Resp: 16   Temp: 97 6 °F (36 4 °C)   SpO2: 97%     Wt Readings from Last 3 Encounters:   04/12/21 76 7 kg (169 lb 3 2 oz)   12/23/19 73 kg (161 lb)   10/07/19 77 1 kg (170 lb)       Physical Exam  Vitals signs and nursing note reviewed     Constitutional:       General: She is not in acute distress  Appearance: Normal appearance  She is well-developed  She is not ill-appearing  HENT:      Head: Normocephalic and atraumatic  Right Ear: Tympanic membrane, ear canal and external ear normal  There is no impacted cerumen  Left Ear: Tympanic membrane, ear canal and external ear normal  There is no impacted cerumen  Eyes:      General:         Right eye: No discharge  Left eye: No discharge  Extraocular Movements: Extraocular movements intact  Conjunctiva/sclera: Conjunctivae normal       Pupils: Pupils are equal, round, and reactive to light  Neck:      Musculoskeletal: Normal range of motion and neck supple  Thyroid: No thyromegaly  Cardiovascular:      Rate and Rhythm: Normal rate and regular rhythm  Pulses:           Dorsalis pedis pulses are 2+ on the right side and 2+ on the left side  Posterior tibial pulses are 2+ on the right side and 2+ on the left side  Heart sounds: Normal heart sounds  No murmur  Pulmonary:      Effort: Pulmonary effort is normal  No respiratory distress  Breath sounds: Normal breath sounds  No wheezing, rhonchi or rales  Abdominal:      General: Bowel sounds are normal       Palpations: Abdomen is soft  Tenderness: There is no abdominal tenderness  There is no guarding or rebound  Comments: NO hepatospenomegaly   Musculoskeletal: Normal range of motion  Right lower leg: No edema  Left lower leg: No edema  Feet:      Right foot:      Skin integrity: No ulcer, skin breakdown, erythema, warmth, callus or dry skin  Left foot:      Skin integrity: No ulcer, skin breakdown, erythema, warmth, callus or dry skin  Lymphadenopathy:      Cervical: No cervical adenopathy  Skin:     Comments: NO RASHES   Neurological:      General: No focal deficit present  Mental Status: She is alert and oriented to person, place, and time  Mental status is at baseline  Cranial Nerves:  No cranial nerve deficit  Psychiatric:         Mood and Affect: Mood normal          Behavior: Behavior normal          Thought Content: Thought content normal          Judgment: Judgment normal      Patient's shoes and socks removed  Right Foot/Ankle   Right Foot Inspection  Skin Exam: skin normal and skin intact no dry skin, no warmth, no callus, no erythema, no maceration, no abnormal color, no pre-ulcer, no ulcer and no callus                          Toe Exam: ROM and strength within normal limits  Sensory   Vibration: intact    Monofilament testing: intact  Vascular    The right DP pulse is 2+  The right PT pulse is 2+  Left Foot/Ankle  Left Foot Inspection  Skin Exam: skin normal and skin intactno dry skin, no warmth, no erythema, no maceration, normal color, no pre-ulcer, no ulcer and no callus                         Toe Exam: ROM and strength within normal limits                   Sensory   Vibration: intact    Monofilament: intact  Vascular    The left DP pulse is 2+  The left PT pulse is 2+  Assign Risk Category:  No deformity present;  No loss of protective sensation;        Risk: 0      Pertinent Laboratory/Diagnostic Studies:  Lab Results   Component Value Date    BUN 12 04/08/2021    CREATININE 0 82 04/08/2021    CALCIUM 9 1 04/08/2021     06/15/2016    K 4 1 04/08/2021    CO2 29 04/08/2021     04/08/2021     Lab Results   Component Value Date    ALT 44 04/08/2021    AST 29 04/08/2021    ALKPHOS 104 04/08/2021    BILITOT 0 5 06/15/2016       Lab Results   Component Value Date    WBC 4 45 04/08/2021    HGB 13 9 04/08/2021    HCT 44 5 04/08/2021    MCV 95 04/08/2021     04/08/2021       Lab Results   Component Value Date    TSH 7 50 (H) 03/12/2020       Lab Results   Component Value Date    CHOL 177 06/15/2016     Lab Results   Component Value Date    TRIG 91 04/08/2021     Lab Results   Component Value Date    HDL 65 04/08/2021     Lab Results   Component Value Date    LDLCALC 98 04/08/2021     Lab Results   Component Value Date    HGBA1C 5 6 04/12/2021       Results for orders placed or performed in visit on 04/12/21   POCT hemoglobin A1c   Result Value Ref Range    Hemoglobin A1C 5 6 6 5       Orders Placed This Encounter   Procedures    Mammo screening bilateral w 3d & cad    Microalbumin / creatinine urine ratio    POCT hemoglobin A1c       ALLERGIES:  No Known Allergies    Current Medications     Current Outpatient Medications   Medication Sig Dispense Refill    atorvastatin (LIPITOR) 20 mg tablet Take 1 tablet (20 mg total) by mouth daily 90 tablet 3    levothyroxine 175 mcg tablet take 1 tablet by mouth once daily 90 tablet 1    Multiple Vitamins-Minerals (MULTIVITAMIN WOMEN 50+ PO) Take 1 caplet by mouth daily       No current facility-administered medications for this visit            Health Maintenance     Health Maintenance   Topic Date Due    Hepatitis C Screening  Never done    Medicare Annual Wellness Visit (AWV)  Never done    Diabetic Foot Exam  Never done    URINE MICROALBUMIN  Never done    DTaP,Tdap,and Td Vaccines (1 - Tdap) Never done    Fall Risk  Never done    Pneumococcal Vaccine: 65+ Years (1 of 1 - PPSV23) Never done    BMI: Followup Plan  09/23/2020    COVID-19 Vaccine (2 - Pfizer 2-dose series) 04/14/2021    MAMMOGRAM  10/07/2021    HEMOGLOBIN A1C  10/08/2021    Cervical Cancer Screening  10/28/2021    Depression Screening PHQ  04/12/2022    BMI: Adult  04/12/2022    Colonoscopy Surveillance  10/31/2022    DM Eye Exam  03/17/2023    Colorectal Cancer Screening  10/31/2027    Influenza Vaccine  Completed    HIB Vaccine  Aged Out    Hepatitis B Vaccine  Aged Out    IPV Vaccine  Aged Out    Hepatitis A Vaccine  Aged Out    Meningococcal ACWY Vaccine  Aged Out    HPV Vaccine  Aged Out     Immunization History   Administered Date(s) Administered    INFLUENZA 10/05/2020    Influenza Quadrivalent Preservative Free 3 years and older IM 11/24/2015, 09/25/2017    Influenza, injectable, quadrivalent, preservative free 0 5 mL 10/15/2018, 09/23/2019    Influenza, seasonal, injectable 01/07/2013, 12/30/2013    SARS-CoV-2 / COVID-19 mRNA IM (Pfizer-BioNTech) 70/26/0544       Denny Long MD     I spent 25 minutes with this patient which greater than 50% spent counseling

## 2021-04-12 NOTE — PROGRESS NOTES
Assessment and Plan:     Problem List Items Addressed This Visit        Endocrine    Type 2 diabetes mellitus ([facility] [address] ) - Primary    Relevant Orders    Microalbumin / creatinine urine ratio    POCT hemoglobin A1c      Other Visit Diagnoses     Periodic health assessment, general screening, adult        Relevant Orders    Mammo screening bilateral w 3d & cad           Preventive health issues were discussed with patient, and age appropriate screening tests were ordered as noted in patient's After Visit Summary  Personalized health advice and appropriate referrals for health education or preventive services given if needed, as noted in patient's After Visit Summary  History of Present Illness:     Patient presents for Medicare Annual Wellness visit    Patient Care Team:  Judith Patel MD as PCP - Zafar Harrington MD as PCP - [address] [place], [place] [address] [facility] ([facility])  Evie Chavez MD Gustavo Marcus, MD as Endoscopist     Problem List:     Patient Active Problem List   Diagnosis    Hyperlipidemia    Hypothyroidism    Onychomycosis    Rectocele    Hypoglycemia    Type 2 diabetes mellitus ([facility] [address] )      Past Medical and Surgical History:     Past Medical History:   Diagnosis Date    Diabetes mellitus ([facility] )     Disease of thyroid gland     hypo    Hyperlipidemia     Hypertension     Insomnia     Rectocele     Screening for colon cancer     Skin cancer      Past Surgical History:   Procedure Laterality Date    CHOLECYSTECTOMY      GASTRECTOMY  2001    TX COLONOSCOPY FLX DX W/COLLJ SPEC WHEN PFRMD N/A 10/31/2017    Procedure: COLONOSCOPY with bx and tatoo at 20cm, polypectomies x2;  Surgeon: Usman Echavarria MD;  Location: [facility];   Service: General    SKIN CANCER EXCISION      left neck areal; left upper arm; back      Family History:     Family History   Problem Relation Age of Onset    Dementia Mother     Hypertension Father     Melanoma Son     No Known Problems Sister     No Known Problems Daughter     No Known Problems Maternal Grandmother     No Known Problems Maternal Grandfather     No Known Problems Paternal Grandmother     No Known Problems Paternal Grandfather     No Known Problems Sister     No Known Problems Sister     No Known Problems Sister     No Known Problems Maternal Aunt     No Known Problems Maternal Aunt     No Known Problems Paternal Aunt       Social History:     E-Cigarette/Vaping    E-Cigarette Use Never User      E-Cigarette/Vaping Substances    Nicotine No     THC No     CBD No     Flavoring No     Other No     Unknown No      Social History     Socioeconomic History    Marital status: /Civil Union     Spouse name: None    Number of children: 2    Years of education: None    Highest education level: None   Occupational History    None   Social Needs    Financial resource strain: None    Food insecurity     Worry: None     Inability: None    Transportation needs     Medical: None     Non-medical: None   Tobacco Use    Smoking status: Never Smoker    Smokeless tobacco: Never Used   Substance and Sexual Activity    Alcohol use: Yes     Comment: social    Drug use: No    Sexual activity: None   Lifestyle    Physical activity     Days per week: None     Minutes per session: None    Stress: None   Relationships    Social connections     Talks on phone: None     Gets together: None     Attends Jainism service: None     Active member of club or organization: None     Attends meetings of clubs or organizations: None     Relationship status: None    Intimate partner violence     Fear of current or ex partner: None     Emotionally abused: None     Physically abused: None     Forced sexual activity: None   Other Topics Concern    None   Social History Narrative    Caffeine use      Medications and Allergies:     Current Outpatient Medications   Medication Sig Dispense Refill    atorvastatin (LIPITOR) 20 mg tablet Take 1 tablet (20 mg total) by mouth daily 90 tablet 3    levothyroxine 175 mcg tablet take 1 tablet by mouth once daily 90 tablet 1    Multiple Vitamins-Minerals (MULTIVITAMIN WOMEN 50+ PO) Take 1 caplet by mouth daily       No current facility-administered medications for this visit  No Known Allergies   Immunizations:     Immunization History   Administered Date(s) Administered    INFLUENZA 10/05/2020    Influenza Quadrivalent Preservative Free 3 years and older IM 11/24/2015, 09/25/2017    Influenza, injectable, quadrivalent, preservative free 0 5 mL 10/15/2018, 09/23/2019    Influenza, seasonal, injectable 01/07/2013, 12/30/2013    SARS-CoV-2 / COVID-19 mRNA IM (Pfizer-BioNTech) 03/24/2021      Health Maintenance:         Topic Date Due    Hepatitis C Screening  Never done    MAMMOGRAM  10/07/2021    Cervical Cancer Screening  10/28/2021    Colonoscopy Surveillance  10/31/2022    Colorectal Cancer Screening  10/31/2027         Topic Date Due    DTaP,Tdap,and Td Vaccines (1 - Tdap) Never done    Pneumococcal Vaccine: 65+ Years (1 of 1 - PPSV23) Never done    COVID-19 Vaccine (2 - Pfizer 2-dose series) 04/14/2021      Medicare Health Risk Assessment:     /80 (BP Location: Left arm, Patient Position: Sitting, Cuff Size: Adult)   Pulse 70   Temp 97 6 °F (36 4 °C) (Temporal)   Resp 16   Ht 5' 4" (1 626 m)   Wt 76 7 kg (169 lb 3 2 oz)   SpO2 97%   BMI 29 04 kg/m²      Adriana Haywood is here for her Subsequent Wellness visit  Health Risk Assessment:   Patient rates overall health as excellent  Patient feels that their physical health rating is much better  Patient is very satisfied with their life  Eyesight was rated as slightly worse  Hearing was rated as same  Patient feels that their emotional and mental health rating is much better  Patients states they are never, rarely angry  Patient states they are never, rarely unusually tired/fatigued  Pain experienced in the last 7 days has been none   Patient states that she has experienced no weight loss or gain in last 6 months  Depression Screening:   PHQ-2 Score: 0      Fall Risk Screening: In the past year, patient has experienced: history of falling in past year    Number of falls: 1  Injured during fall?: No    Feels unsteady when standing or walking?: No    Worried about falling?: No      Urinary Incontinence Screening:   Patient has not leaked urine accidently in the last six months  Home Safety:  Patient does not have trouble with stairs inside or outside of their home  Patient has working smoke alarms and has no working carbon monoxide detector  Home safety hazards include: none  Nutrition:   Current diet is Regular and Low Carb  Medications:   Patient is currently taking over-the-counter supplements  OTC medications include: see medication list  Patient is able to manage medications  Activities of Daily Living (ADLs)/Instrumental Activities of Daily Living (IADLs):   Walk and transfer into and out of bed and chair?: Yes  Dress and groom yourself?: Yes    Bathe or shower yourself?: Yes    Feed yourself?  Yes  Manage your money, pay your bills and track your expenses?: Yes  Make your own meals?: Yes    Do your own shopping?: Yes    Previous Hospitalizations:   Any hospitalizations or ED visits within the last 12 months?: No      Advance Care Planning:   Living will: No    Durable POA for healthcare: No    Advanced directive: No      PREVENTIVE SCREENINGS      Cardiovascular Screening:    General: History Lipid Disorder and Screening Current      Diabetes Screening:     General: History Diabetes and Screening Current      Colorectal Cancer Screening:     General: Screening Current      Breast Cancer Screening:     General: Screening Current      Cervical Cancer Screening:    General: Screening Not Indicated      Lung Cancer Screening:     General: Screening Not Indicated    Screening, Brief Intervention, and Referral to Treatment (SBIRT)    Screening  Typical number of drinks in a day: 0  Typical number of drinks in a week: 0  Interpretation: Low risk drinking behavior      Single Item Drug Screening:  How often have you used an illegal drug (including marijuana) or a prescription medication for non-medical reasons in the past year? never    Single Item Drug Screen Score: 0  Interpretation: Negative screen for possible drug use disorder      Zulay Palmer MD

## 2021-04-12 NOTE — PATIENT INSTRUCTIONS
Medicare Preventive Visit Patient Instructions  Thank you for completing your Welcome to Medicare Visit or Medicare Annual Wellness Visit today  Your next wellness visit will be due in one year (4/13/2022)  The screening/preventive services that you may require over the next 5-10 years are detailed below  Some tests may not apply to you based off risk factors and/or age  Screening tests ordered at today's visit but not completed yet may show as past due  Also, please note that scanned in results may not display below  Preventive Screenings:  Service Recommendations Previous Testing/Comments   Colorectal Cancer Screening  * Colonoscopy    * Fecal Occult Blood Test (FOBT)/Fecal Immunochemical Test (FIT)  * Fecal DNA/Cologuard Test  * Flexible Sigmoidoscopy Age: 54-65 years old   Colonoscopy: every 10 years (may be performed more frequently if at higher risk)  OR  FOBT/FIT: every 1 year  OR  Cologuard: every 3 years  OR  Sigmoidoscopy: every 5 years  Screening may be recommended earlier than age 48 if at higher risk for colorectal cancer  Also, an individualized decision between you and your healthcare provider will decide whether screening between the ages of 74-80 would be appropriate  Colonoscopy: 10/31/2017  FOBT/FIT: Not on file  Cologuard: Not on file  Sigmoidoscopy: Not on file          Breast Cancer Screening Age: 36 years old  Frequency: every 1-2 years  Not required if history of left and right mastectomy Mammogram: 10/07/2019        Cervical Cancer Screening Between the ages of 21-29, pap smear recommended once every 3 years  Between the ages of 33-67, can perform pap smear with HPV co-testing every 5 years     Recommendations may differ for women with a history of total hysterectomy, cervical cancer, or abnormal pap smears in past  Pap Smear: 10/28/2016        Hepatitis C Screening Once for adults born between 1945 and 1965  More frequently in patients at high risk for Hepatitis C Hep C Antibody: Not on file        Diabetes Screening 1-2 times per year if you're at risk for diabetes or have pre-diabetes Fasting glucose: 79 mg/dL   A1C: 5 6        Cholesterol Screening Once every 5 years if you don't have a lipid disorder  May order more often based on risk factors  Lipid panel: 04/08/2021          Other Preventive Screenings Covered by Medicare:  1  Abdominal Aortic Aneurysm (AAA) Screening: covered once if your at risk  You're considered to be at risk if you have a family history of AAA  2  Lung Cancer Screening: covers low dose CT scan once per year if you meet all of the following conditions: (1) Age 50-69; (2) No signs or symptoms of lung cancer; (3) Current smoker or have quit smoking within the last 15 years; (4) You have a tobacco smoking history of at least 30 pack years (packs per day multiplied by number of years you smoked); (5) You get a written order from a healthcare provider  3  Glaucoma Screening: covered annually if you're considered high risk: (1) You have diabetes OR (2) Family history of glaucoma OR (3)  aged 48 and older OR (3)  American aged 72 and older  3  Osteoporosis Screening: covered every 2 years if you meet one of the following conditions: (1) You're estrogen deficient and at risk for osteoporosis based off medical history and other findings; (2) Have a vertebral abnormality; (3) On glucocorticoid therapy for more than 3 months; (4) Have primary hyperparathyroidism; (5) On osteoporosis medications and need to assess response to drug therapy  · Last bone density test (DXA Scan): Not on file  5  HIV Screening: covered annually if you're between the age of 12-76  Also covered annually if you are younger than 13 and older than 72 with risk factors for HIV infection  For pregnant patients, it is covered up to 3 times per pregnancy      Immunizations:  Immunization Recommendations   Influenza Vaccine Annual influenza vaccination during flu season is recommended for all persons aged >= 6 months who do not have contraindications   Pneumococcal Vaccine (Prevnar and Pneumovax)  * Prevnar = PCV13  * Pneumovax = PPSV23   Adults 25-60 years old: 1-3 doses may be recommended based on certain risk factors  Adults 72 years old: Prevnar (PCV13) vaccine recommended followed by Pneumovax (PPSV23) vaccine  If already received PPSV23 since turning 65, then PCV13 recommended at least one year after PPSV23 dose  Hepatitis B Vaccine 3 dose series if at intermediate or high risk (ex: diabetes, end stage renal disease, liver disease)   Tetanus (Td) Vaccine - COST NOT COVERED BY MEDICARE PART B Following completion of primary series, a booster dose should be given every 10 years to maintain immunity against tetanus  Td may also be given as tetanus wound prophylaxis  Tdap Vaccine - COST NOT COVERED BY MEDICARE PART B Recommended at least once for all adults  For pregnant patients, recommended with each pregnancy  Shingles Vaccine (Shingrix) - COST NOT COVERED BY MEDICARE PART B  2 shot series recommended in those aged 48 and above     Health Maintenance Due:      Topic Date Due    Hepatitis C Screening  Never done    MAMMOGRAM  10/07/2021    Cervical Cancer Screening  10/28/2021    Colonoscopy Surveillance  10/31/2022    Colorectal Cancer Screening  10/31/2027     Immunizations Due:      Topic Date Due    DTaP,Tdap,and Td Vaccines (1 - Tdap) Never done    Pneumococcal Vaccine: 65+ Years (1 of 1 - PPSV23) Never done    COVID-19 Vaccine (2 - Pfizer 2-dose series) 04/14/2021     Advance Directives   What are advance directives? Advance directives are legal documents that state your wishes and plans for medical care  These plans are made ahead of time in case you lose your ability to make decisions for yourself  Advance directives can apply to any medical decision, such as the treatments you want, and if you want to donate organs  What are the types of advance directives?   There are many types of advance directives, and each state has rules about how to use them  You may choose a combination of any of the following:  · Living will: This is a written record of the treatment you want  You can also choose which treatments you do not want, which to limit, and which to stop at a certain time  This includes surgery, medicine, IV fluid, and tube feedings  · Durable power of  for healthcare [facility]): This is a written record that states who you want to make healthcare choices for you when you are unable to make them for yourself  This person, called a proxy, is usually a family member or a friend  You may choose more than 1 proxy  · Do not resuscitate (DNR) order:  A DNR order is used in case your heart stops beating or you stop breathing  It is a request not to have certain forms of treatment, such as CPR  A DNR order may be included in other types of advance directives  · Medical directive: This covers the care that you want if you are in a coma, near death, or unable to make decisions for yourself  You can list the treatments you want for each condition  Treatment may include pain medicine, surgery, blood transfusions, dialysis, IV or tube feedings, and a ventilator (breathing machine)  · Values history: This document has questions about your views, beliefs, and how you feel and think about life  This information can help others choose the care that you would choose  Why are advance directives important? An advance directive helps you control your care  Although spoken wishes may be used, it is better to have your wishes written down  Spoken wishes can be misunderstood, or not followed  Treatments may be given even if you do not want them  An advance directive may make it easier for your family to make difficult choices about your care     Weight Management   Why it is important to manage your weight:  Being overweight increases your risk of health conditions such as heart disease, high blood pressure, type 2 diabetes, and certain types of cancer  It can also increase your risk for osteoarthritis, sleep apnea, and other respiratory problems  Aim for a slow, steady weight loss  Even a small amount of weight loss can lower your risk of health problems  How to lose weight safely:  A safe and healthy way to lose weight is to eat fewer calories and get regular exercise  You can lose up about 1 pound a week by decreasing the number of calories you eat by 500 calories each day  Healthy meal plan for weight management:  A healthy meal plan includes a variety of foods, contains fewer calories, and helps you stay healthy  A healthy meal plan includes the following:  · Eat whole-grain foods more often  A healthy meal plan should contain fiber  Fiber is the part of grains, fruits, and vegetables that is not broken down by your body  Whole-grain foods are healthy and provide extra fiber in your diet  Some examples of whole-grain foods are whole-wheat breads and pastas, oatmeal, brown rice, and bulgur  · Eat a variety of vegetables every day  Include dark, leafy greens such as spinach, kale, jeanmarie greens, and mustard greens  Eat yellow and orange vegetables such as carrots, sweet potatoes, and winter squash  · Eat a variety of fruits every day  Choose fresh or canned fruit (canned in its own juice or light syrup) instead of juice  Fruit juice has very little or no fiber  · Eat low-fat dairy foods  Drink fat-free (skim) milk or 1% milk  Eat fat-free yogurt and low-fat cottage cheese  Try low-fat cheeses such as mozzarella and other reduced-fat cheeses  · Choose meat and other protein foods that are low in fat  Choose beans or other legumes such as split peas or lentils  Choose fish, skinless poultry (chicken or turkey), or lean cuts of red meat (beef or pork)  Before you cook meat or poultry, cut off any visible fat  · Use less fat and oil  Try baking foods instead of frying them  Add less fat, such as margarine, sour cream, regular salad dressing and mayonnaise to foods  Eat fewer high-fat foods  Some examples of high-fat foods include french fries, doughnuts, ice cream, and cakes  · Eat fewer sweets  Limit foods and drinks that are high in sugar  This includes candy, cookies, regular soda, and sweetened drinks  Exercise:  Exercise at least 30 minutes per day on most days of the week  Some examples of exercise include walking, biking, dancing, and swimming  You can also fit in more physical activity by taking the stairs instead of the elevator or parking farther away from stores  Ask your healthcare provider about the best exercise plan for you  © Copyright [employer] 2018 Information is for End User's use only and may not be sold, redistributed or otherwise used for commercial purposes  All illustrations and images included in CareNotes® are the copyrighted property of [employer]  or [facility] Preventive Visit Patient Instructions  Thank you for completing your Welcome to Medicare Visit or Medicare Annual Wellness Visit today  Your next wellness visit will be due in one year (4/13/2022)  The screening/preventive services that you may require over the next 5-10 years are detailed below  Some tests may not apply to you based off risk factors and/or age  Screening tests ordered at today's visit but not completed yet may show as past due  Also, please note that scanned in results may not display below    Preventive Screenings:  Service Recommendations Previous Testing/Comments   Colorectal Cancer Screening  * Colonoscopy    * Fecal Occult Blood Test (FOBT)/Fecal Immunochemical Test (FIT)  * Fecal DNA/Cologuard Test  * Flexible Sigmoidoscopy Age: 54-65 years old   Colonoscopy: every 10 years (may be performed more frequently if at higher risk)  OR  FOBT/FIT: every 1 year  OR  Cologuard: every 3 years  OR  Sigmoidoscopy: every 5 years  Screening may be recommended earlier than age 48 if at higher risk for colorectal cancer  Also, an individualized decision between you and your healthcare provider will decide whether screening between the ages of 74-80 would be appropriate  Colonoscopy: 10/31/2017  FOBT/FIT: Not on file  Cologuard: Not on file  Sigmoidoscopy: Not on file    Screening Current     Breast Cancer Screening Age: 36 years old  Frequency: every 1-2 years  Not required if history of left and right mastectomy Mammogram: 10/07/2019    Screening Current   Cervical Cancer Screening Between the ages of 21-29, pap smear recommended once every 3 years  Between the ages of 33-67, can perform pap smear with HPV co-testing every 5 years  Recommendations may differ for women with a history of total hysterectomy, cervical cancer, or abnormal pap smears in past  Pap Smear: 10/28/2016    Screening Not Indicated   Hepatitis C Screening Once for adults born between 1945 and 1965  More frequently in patients at high risk for Hepatitis C Hep C Antibody: Not on file        Diabetes Screening 1-2 times per year if you're at risk for diabetes or have pre-diabetes Fasting glucose: 79 mg/dL   A1C: 5 6    Screening Not Indicated  History Diabetes   Cholesterol Screening Once every 5 years if you don't have a lipid disorder  May order more often based on risk factors  Lipid panel: 04/08/2021    Screening Not Indicated  History Lipid Disorder     Other Preventive Screenings Covered by Medicare:  6  Abdominal Aortic Aneurysm (AAA) Screening: covered once if your at risk  You're considered to be at risk if you have a family history of AAA    7  Lung Cancer Screening: covers low dose CT scan once per year if you meet all of the following conditions: (1) Age 50-69; (2) No signs or symptoms of lung cancer; (3) Current smoker or have quit smoking within the last 15 years; (4) You have a tobacco smoking history of at least 30 pack years (packs per day multiplied by number of years you smoked); (5) You get a written order from a healthcare provider  8  Glaucoma Screening: covered annually if you're considered high risk: (1) You have diabetes OR (2) Family history of glaucoma OR (3)  aged 48 and older OR (3)  American aged 72 and older  5  Osteoporosis Screening: covered every 2 years if you meet one of the following conditions: (1) You're estrogen deficient and at risk for osteoporosis based off medical history and other findings; (2) Have a vertebral abnormality; (3) On glucocorticoid therapy for more than 3 months; (4) Have primary hyperparathyroidism; (5) On osteoporosis medications and need to assess response to drug therapy  · Last bone density test (DXA Scan): Not on file  10  HIV Screening: covered annually if you're between the age of 12-76  Also covered annually if you are younger than 13 and older than 72 with risk factors for HIV infection  For pregnant patients, it is covered up to 3 times per pregnancy  Immunizations:  Immunization Recommendations   Influenza Vaccine Annual influenza vaccination during flu season is recommended for all persons aged >= 6 months who do not have contraindications   Pneumococcal Vaccine (Prevnar and Pneumovax)  * Prevnar = PCV13  * Pneumovax = PPSV23   Adults 25-60 years old: 1-3 doses may be recommended based on certain risk factors  Adults 72 years old: Prevnar (PCV13) vaccine recommended followed by Pneumovax (PPSV23) vaccine  If already received PPSV23 since turning 65, then PCV13 recommended at least one year after PPSV23 dose  Hepatitis B Vaccine 3 dose series if at intermediate or high risk (ex: diabetes, end stage renal disease, liver disease)   Tetanus (Td) Vaccine - COST NOT COVERED BY MEDICARE PART B Following completion of primary series, a booster dose should be given every 10 years to maintain immunity against tetanus  Td may also be given as tetanus wound prophylaxis     Tdap Vaccine - COST NOT COVERED BY MEDICARE PART B Recommended at least once for all adults  For pregnant patients, recommended with each pregnancy  Shingles Vaccine (Shingrix) - COST NOT COVERED BY MEDICARE PART B  2 shot series recommended in those aged 48 and above     Health Maintenance Due:      Topic Date Due    Hepatitis C Screening  Never done    MAMMOGRAM  10/07/2021    Cervical Cancer Screening  10/28/2021    Colonoscopy Surveillance  10/31/2022    Colorectal Cancer Screening  10/31/2027     Immunizations Due:      Topic Date Due    DTaP,Tdap,and Td Vaccines (1 - Tdap) Never done    Pneumococcal Vaccine: 65+ Years (1 of 1 - PPSV23) Never done    COVID-19 Vaccine (2 - Pfizer 2-dose series) 04/14/2021     Advance Directives   What are advance directives? Advance directives are legal documents that state your wishes and plans for medical care  These plans are made ahead of time in case you lose your ability to make decisions for yourself  Advance directives can apply to any medical decision, such as the treatments you want, and if you want to donate organs  What are the types of advance directives? There are many types of advance directives, and each state has rules about how to use them  You may choose a combination of any of the following:  · Living will: This is a written record of the treatment you want  You can also choose which treatments you do not want, which to limit, and which to stop at a certain time  This includes surgery, medicine, IV fluid, and tube feedings  · Durable power of  for healthcare [facility]): This is a written record that states who you want to make healthcare choices for you when you are unable to make them for yourself  This person, called a proxy, is usually a family member or a friend  You may choose more than 1 proxy  · Do not resuscitate (DNR) order:  A DNR order is used in case your heart stops beating or you stop breathing   It is a request not to have certain forms of treatment, such as CPR  A DNR order may be included in other types of advance directives  · Medical directive: This covers the care that you want if you are in a coma, near death, or unable to make decisions for yourself  You can list the treatments you want for each condition  Treatment may include pain medicine, surgery, blood transfusions, dialysis, IV or tube feedings, and a ventilator (breathing machine)  · Values history: This document has questions about your views, beliefs, and how you feel and think about life  This information can help others choose the care that you would choose  Why are advance directives important? An advance directive helps you control your care  Although spoken wishes may be used, it is better to have your wishes written down  Spoken wishes can be misunderstood, or not followed  Treatments may be given even if you do not want them  An advance directive may make it easier for your family to make difficult choices about your care  Fall Prevention    Fall prevention  includes ways to make your home and other areas safer  It also includes ways you can move more carefully to prevent a fall  Health conditions that cause changes in your blood pressure, vision, or muscle strength and coordination may increase your risk for falls  Medicines may also increase your risk for falls if they make you dizzy, weak, or sleepy  Fall prevention tips:   · Stand or sit up slowly  · Use assistive devices as directed  · Wear shoes that fit well and have soles that   · Wear a personal alarm  · Stay active  · Manage your medical conditions  Home Safety Tips:  · Add items to prevent falls in the bathroom  · Keep paths clear  · Install bright lights in your home  · Keep items you use often on shelves within reach  · Paint or place reflective tape on the edges of your stairs      Weight Management   Why it is important to manage your weight:  Being overweight increases your risk of health conditions such as heart disease, high blood pressure, type 2 diabetes, and certain types of cancer  It can also increase your risk for osteoarthritis, sleep apnea, and other respiratory problems  Aim for a slow, steady weight loss  Even a small amount of weight loss can lower your risk of health problems  How to lose weight safely:  A safe and healthy way to lose weight is to eat fewer calories and get regular exercise  You can lose up about 1 pound a week by decreasing the number of calories you eat by 500 calories each day  Healthy meal plan for weight management:  A healthy meal plan includes a variety of foods, contains fewer calories, and helps you stay healthy  A healthy meal plan includes the following:  · Eat whole-grain foods more often  A healthy meal plan should contain fiber  Fiber is the part of grains, fruits, and vegetables that is not broken down by your body  Whole-grain foods are healthy and provide extra fiber in your diet  Some examples of whole-grain foods are whole-wheat breads and pastas, oatmeal, brown rice, and bulgur  · Eat a variety of vegetables every day  Include dark, leafy greens such as spinach, kale, jeanmarie greens, and mustard greens  Eat yellow and orange vegetables such as carrots, sweet potatoes, and winter squash  · Eat a variety of fruits every day  Choose fresh or canned fruit (canned in its own juice or light syrup) instead of juice  Fruit juice has very little or no fiber  · Eat low-fat dairy foods  Drink fat-free (skim) milk or 1% milk  Eat fat-free yogurt and low-fat cottage cheese  Try low-fat cheeses such as mozzarella and other reduced-fat cheeses  · Choose meat and other protein foods that are low in fat  Choose beans or other legumes such as split peas or lentils  Choose fish, skinless poultry (chicken or turkey), or lean cuts of red meat (beef or pork)  Before you cook meat or poultry, cut off any visible fat  · Use less fat and oil    Try baking foods instead of frying them  Add less fat, such as margarine, sour cream, regular salad dressing and mayonnaise to foods  Eat fewer high-fat foods  Some examples of high-fat foods include french fries, doughnuts, ice cream, and cakes  · Eat fewer sweets  Limit foods and drinks that are high in sugar  This includes candy, cookies, regular soda, and sweetened drinks  Exercise:  Exercise at least 30 minutes per day on most days of the week  Some examples of exercise include walking, biking, dancing, and swimming  You can also fit in more physical activity by taking the stairs instead of the elevator or parking farther away from stores  Ask your healthcare provider about the best exercise plan for you  © Copyright [employer] 2018 Information is for End User's use only and may not be sold, redistributed or otherwise used for commercial purposes   All illustrations and images included in CareNotes® are the copyrighted property of [employer]  or [address] [place] [address]

## 2021-04-13 DIAGNOSIS — E03.9 HYPOTHYROIDISM, UNSPECIFIED TYPE: ICD-10-CM

## 2021-04-13 RX ORDER — LEVOTHYROXINE SODIUM 175 UG/1
TABLET ORAL
Qty: 90 TABLET | Refills: 1 | Status: SHIPPED | OUTPATIENT
Start: 2021-04-13 | End: 2021-12-10

## 2021-04-15 ENCOUNTER — IMMUNIZATIONS (OUTPATIENT)
Dept: FAMILY MEDICINE CLINIC | Facility: HOSPITAL | Age: 66
End: 2021-04-15

## 2021-04-15 DIAGNOSIS — Z23 ENCOUNTER FOR IMMUNIZATION: Primary | ICD-10-CM

## 2021-04-15 PROCEDURE — 0002A SARS-COV-2 / COVID-19 MRNA VACCINE (PFIZER-BIONTECH) 30 MCG: CPT

## 2021-04-15 PROCEDURE — 91300 SARS-COV-2 / COVID-19 MRNA VACCINE (PFIZER-BIONTECH) 30 MCG: CPT

## 2021-10-27 ENCOUNTER — HOSPITAL ENCOUNTER (OUTPATIENT)
Dept: MAMMOGRAPHY | Facility: IMAGING CENTER | Age: 66
Discharge: HOME | End: 2021-10-27
Payer: COMMERCIAL

## 2021-10-27 VITALS — HEIGHT: 64 IN | WEIGHT: 145 LBS | BODY MASS INDEX: 24.75 KG/M2

## 2021-10-27 DIAGNOSIS — Z12.31 VISIT FOR SCREENING MAMMOGRAM: ICD-10-CM

## 2021-10-27 DIAGNOSIS — Z00.00 PERIODIC HEALTH ASSESSMENT, GENERAL SCREENING, ADULT: ICD-10-CM

## 2021-10-27 PROCEDURE — 77063 BREAST TOMOSYNTHESIS BI: CPT

## 2021-10-27 PROCEDURE — 77067 SCR MAMMO BI INCL CAD: CPT

## 2021-12-10 DIAGNOSIS — E03.9 HYPOTHYROIDISM, UNSPECIFIED TYPE: ICD-10-CM

## 2021-12-10 RX ORDER — LEVOTHYROXINE SODIUM 175 UG/1
TABLET ORAL
Qty: 90 TABLET | Refills: 1 | Status: SHIPPED | OUTPATIENT
Start: 2021-12-10 | End: 2022-04-18 | Stop reason: SDUPTHER

## 2022-04-01 ENCOUNTER — TELEPHONE (OUTPATIENT)
Dept: FAMILY MEDICINE CLINIC | Facility: CLINIC | Age: 67
End: 2022-04-01

## 2022-04-01 NOTE — TELEPHONE ENCOUNTER
Patient called, has an appointment next week patient wanted to know what blood work she needed to have for this appointment

## 2022-04-05 ENCOUNTER — APPOINTMENT (OUTPATIENT)
Dept: LAB | Facility: CLINIC | Age: 67
End: 2022-04-05
Payer: COMMERCIAL

## 2022-04-05 DIAGNOSIS — E78.5 HYPERLIPIDEMIA, UNSPECIFIED HYPERLIPIDEMIA TYPE: ICD-10-CM

## 2022-04-05 DIAGNOSIS — E03.9 HYPOTHYROIDISM, UNSPECIFIED TYPE: ICD-10-CM

## 2022-04-05 DIAGNOSIS — E11.9 TYPE 2 DIABETES MELLITUS WITHOUT COMPLICATION, WITHOUT LONG-TERM CURRENT USE OF INSULIN (HCC): ICD-10-CM

## 2022-04-05 LAB
ALBUMIN SERPL BCP-MCNC: 3.6 G/DL (ref 3.5–5)
ALP SERPL-CCNC: 97 U/L (ref 46–116)
ALT SERPL W P-5'-P-CCNC: 68 U/L (ref 12–78)
ANION GAP SERPL CALCULATED.3IONS-SCNC: 3 MMOL/L (ref 4–13)
AST SERPL W P-5'-P-CCNC: 33 U/L (ref 5–45)
BILIRUB SERPL-MCNC: 0.41 MG/DL (ref 0.2–1)
BUN SERPL-MCNC: 20 MG/DL (ref 5–25)
CALCIUM SERPL-MCNC: 9.5 MG/DL (ref 8.3–10.1)
CHLORIDE SERPL-SCNC: 108 MMOL/L (ref 100–108)
CHOLEST SERPL-MCNC: 220 MG/DL
CO2 SERPL-SCNC: 30 MMOL/L (ref 21–32)
CREAT SERPL-MCNC: 0.88 MG/DL (ref 0.6–1.3)
CREAT UR-MCNC: 18 MG/DL
ERYTHROCYTE [DISTWIDTH] IN BLOOD BY AUTOMATED COUNT: 13.6 % (ref 11.6–15.1)
EST. AVERAGE GLUCOSE BLD GHB EST-MCNC: 128 MG/DL
GFR SERPL CREATININE-BSD FRML MDRD: 68 ML/MIN/1.73SQ M
GLUCOSE P FAST SERPL-MCNC: 104 MG/DL (ref 65–99)
HBA1C MFR BLD: 6.1 %
HCT VFR BLD AUTO: 43.6 % (ref 34.8–46.1)
HDLC SERPL-MCNC: 74 MG/DL
HGB BLD-MCNC: 13.4 G/DL (ref 11.5–15.4)
LDLC SERPL CALC-MCNC: 122 MG/DL (ref 0–100)
MCH RBC QN AUTO: 28.6 PG (ref 26.8–34.3)
MCHC RBC AUTO-ENTMCNC: 30.7 G/DL (ref 31.4–37.4)
MCV RBC AUTO: 93 FL (ref 82–98)
MICROALBUMIN UR-MCNC: 9.2 MG/L (ref 0–20)
MICROALBUMIN/CREAT 24H UR: 51 MG/G CREATININE (ref 0–30)
NONHDLC SERPL-MCNC: 146 MG/DL
PLATELET # BLD AUTO: 254 THOUSANDS/UL (ref 149–390)
PMV BLD AUTO: 10.9 FL (ref 8.9–12.7)
POTASSIUM SERPL-SCNC: 4.5 MMOL/L (ref 3.5–5.3)
PROT SERPL-MCNC: 6.8 G/DL (ref 6.4–8.2)
RBC # BLD AUTO: 4.69 MILLION/UL (ref 3.81–5.12)
SODIUM SERPL-SCNC: 141 MMOL/L (ref 136–145)
TRIGL SERPL-MCNC: 120 MG/DL
TSH SERPL DL<=0.05 MIU/L-ACNC: 0.25 UIU/ML (ref 0.45–4.5)
WBC # BLD AUTO: 3.9 THOUSAND/UL (ref 4.31–10.16)

## 2022-04-05 PROCEDURE — 85027 COMPLETE CBC AUTOMATED: CPT

## 2022-04-05 PROCEDURE — 82043 UR ALBUMIN QUANTITATIVE: CPT

## 2022-04-05 PROCEDURE — 82570 ASSAY OF URINE CREATININE: CPT

## 2022-04-05 PROCEDURE — 84443 ASSAY THYROID STIM HORMONE: CPT

## 2022-04-05 PROCEDURE — 80061 LIPID PANEL: CPT

## 2022-04-05 PROCEDURE — 3061F NEG MICROALBUMINURIA REV: CPT | Performed by: FAMILY MEDICINE

## 2022-04-05 PROCEDURE — 3044F HG A1C LEVEL LT 7.0%: CPT | Performed by: FAMILY MEDICINE

## 2022-04-05 PROCEDURE — 80053 COMPREHEN METABOLIC PANEL: CPT

## 2022-04-05 PROCEDURE — 36415 COLL VENOUS BLD VENIPUNCTURE: CPT

## 2022-04-05 PROCEDURE — 83036 HEMOGLOBIN GLYCOSYLATED A1C: CPT

## 2022-04-18 ENCOUNTER — OFFICE VISIT (OUTPATIENT)
Dept: FAMILY MEDICINE CLINIC | Facility: CLINIC | Age: 67
End: 2022-04-18
Payer: COMMERCIAL

## 2022-04-18 VITALS
DIASTOLIC BLOOD PRESSURE: 78 MMHG | SYSTOLIC BLOOD PRESSURE: 138 MMHG | BODY MASS INDEX: 28.34 KG/M2 | HEART RATE: 71 BPM | TEMPERATURE: 97.5 F | WEIGHT: 166 LBS | RESPIRATION RATE: 18 BRPM | OXYGEN SATURATION: 97 % | HEIGHT: 64 IN

## 2022-04-18 DIAGNOSIS — G47.00 INSOMNIA, UNSPECIFIED TYPE: ICD-10-CM

## 2022-04-18 DIAGNOSIS — E03.9 HYPOTHYROIDISM, UNSPECIFIED TYPE: Primary | ICD-10-CM

## 2022-04-18 DIAGNOSIS — E11.9 TYPE 2 DIABETES MELLITUS WITHOUT COMPLICATION, WITHOUT LONG-TERM CURRENT USE OF INSULIN (HCC): ICD-10-CM

## 2022-04-18 DIAGNOSIS — R10.13 EPIGASTRIC PAIN: ICD-10-CM

## 2022-04-18 PROCEDURE — 1160F RVW MEDS BY RX/DR IN RCRD: CPT | Performed by: FAMILY MEDICINE

## 2022-04-18 PROCEDURE — G0439 PPPS, SUBSEQ VISIT: HCPCS | Performed by: FAMILY MEDICINE

## 2022-04-18 PROCEDURE — 1170F FXNL STATUS ASSESSED: CPT | Performed by: FAMILY MEDICINE

## 2022-04-18 PROCEDURE — 3288F FALL RISK ASSESSMENT DOCD: CPT | Performed by: FAMILY MEDICINE

## 2022-04-18 PROCEDURE — 3008F BODY MASS INDEX DOCD: CPT | Performed by: FAMILY MEDICINE

## 2022-04-18 PROCEDURE — 1125F AMNT PAIN NOTED PAIN PRSNT: CPT | Performed by: FAMILY MEDICINE

## 2022-04-18 PROCEDURE — 3725F SCREEN DEPRESSION PERFORMED: CPT | Performed by: FAMILY MEDICINE

## 2022-04-18 PROCEDURE — 99214 OFFICE O/P EST MOD 30 MIN: CPT | Performed by: FAMILY MEDICINE

## 2022-04-18 PROCEDURE — 1036F TOBACCO NON-USER: CPT | Performed by: FAMILY MEDICINE

## 2022-04-18 RX ORDER — LEVOTHYROXINE SODIUM 0.15 MG/1
150 TABLET ORAL DAILY
Qty: 90 TABLET | Refills: 1 | Status: SHIPPED | OUTPATIENT
Start: 2022-04-18

## 2022-04-18 RX ORDER — TRAZODONE HYDROCHLORIDE 50 MG/1
50 TABLET ORAL
Qty: 30 TABLET | Refills: 2 | Status: SHIPPED | OUTPATIENT
Start: 2022-04-18 | End: 2022-05-20 | Stop reason: SDUPTHER

## 2022-04-18 NOTE — ASSESSMENT & PLAN NOTE
Hypothyroidism  Patient was given prescription to lower her levothyroxine to 150 mcg tablet once daily    She will have follow-up blood work prior to next office visit

## 2022-04-18 NOTE — PROGRESS NOTES
Assessment and Plan:     Problem List Items Addressed This Visit     None           Preventive health issues were discussed with patient, and age appropriate screening tests were ordered as noted in patient's After Visit Summary  Personalized health advice and appropriate referrals for health education or preventive services given if needed, as noted in patient's After Visit Summary  History of Present Illness:     Patient presents for Medicare Annual Wellness visit    Patient Care Team:  Wolf Marie MD as PCP - Enoc Brooks MD as PCP - [address] [place], [place] [address] (RTE)  Terri Thibodeaux MD Ripley Marrow, MD as Endoscopist  Grace York OD (Optometry)     Problem List:     Patient Active Problem List   Diagnosis    Hyperlipidemia    Hypothyroidism    Onychomycosis    Rectocele    Hypoglycemia    Type 2 diabetes mellitus ([facility] [address] )    Epigastric pain      Past Medical and Surgical History:     Past Medical History:   Diagnosis Date    Diabetes mellitus ([facility] [address] [facility]     Disease of thyroid gland     hypo    Hyperlipidemia     Hypertension     Insomnia     Rectocele     Screening for colon cancer     Skin cancer      Past Surgical History:   Procedure Laterality Date    CHOLECYSTECTOMY      GASTRECTOMY  2001    ND COLONOSCOPY FLX DX W/COLLJ SPEC WHEN PFRMD N/A 10/31/2017    Procedure: COLONOSCOPY with bx and tatoo at 20cm, polypectomies x2;  Surgeon: Gricelda Melendez MD;  Location: [facility];   Service: General    SKIN CANCER EXCISION      left neck areal; left upper arm; back      Family History:     Family History   Problem Relation Age of Onset   [facility] Dementia Mother     Hypertension Father     Melanoma Son     No Known Problems Sister     No Known Problems Daughter     No Known Problems Maternal Grandmother     No Known Problems Maternal Grandfather     No Known Problems Paternal Grandmother     No Known Problems Paternal Grandfather     No Known Problems Sister     No Known Problems Sister     No Known Problems Sister     No Known Problems Maternal Aunt     No Known Problems Maternal Aunt     No Known Problems Paternal Aunt       Social History:     Social History     Socioeconomic History    Marital status: /Civil Union     Spouse name: None    Number of children: 2    Years of education: None    Highest education level: None   Occupational History    None   Tobacco Use    Smoking status: Never Smoker    Smokeless tobacco: Never Used   Vaping Use    Vaping Use: Never used   Substance and Sexual Activity    Alcohol use: Yes     Comment: social    Drug use: No    Sexual activity: Not Currently   Other Topics Concern    None   Social History Narrative    Caffeine use     Social Determinants of Health     Financial Resource Strain: Not on file   Food Insecurity: Not on file   Transportation Needs: Not on file   Physical Activity: Not on file   Stress: Not on file   Social Connections: Not on file   Intimate Partner Violence: Not on file   Housing Stability: Not on file      Medications and Allergies:     Current Outpatient Medications   Medication Sig Dispense Refill    levothyroxine 175 mcg tablet take 1 tablet by mouth once daily 90 tablet 1    Multiple Vitamins-Minerals (MULTIVITAMIN WOMEN 50+ PO) Take 1 caplet by mouth daily       No current facility-administered medications for this visit       No Known Allergies   Immunizations:     Immunization History   Administered Date(s) Administered    COVID-19 PFIZER VACCINE 0 3 ML IM 03/24/2021, 04/15/2021, 10/26/2021    COVID-19 Pfizer vac (Stefano-sucrose, gray cap) 12 yr+ IM 04/01/2022    COVID-19, unspecified 04/01/2022    INFLUENZA 10/05/2020, 10/26/2021    Influenza Quadrivalent Preservative Free 3 years and older IM 11/24/2015, 09/25/2017    Influenza, injectable, quadrivalent, preservative free 0 5 mL 10/15/2018, 09/23/2019    Influenza, seasonal, injectable 01/07/2013, 12/30/2013      Health Maintenance:         Topic Date Due    Hepatitis C Screening  Never done    Cervical Cancer Screening  10/28/2021    Colorectal Cancer Screening  10/31/2022    Breast Cancer Screening: Mammogram  10/27/2023         Topic Date Due    Pneumococcal Vaccine: 65+ Years (1 of 2 - PPSV23) Never done    DTaP,Tdap,and Td Vaccines (1 - Tdap) Never done      Medicare Health Risk Assessment:     There were no vitals taken for this visit  Health Risk Assessment:   Patient rates overall health as very good  Patient feels that their physical health rating is same  Patient is satisfied with their life  Eyesight was rated as same  Hearing was rated as same  Patient feels that their emotional and mental health rating is same  Patients states they are never, rarely angry  Patient states they are never, rarely unusually tired/fatigued  Pain experienced in the last 7 days has been some  Patient's pain rating has been 4/10  Patient states that she has experienced no weight loss or gain in last 6 months  Depression Screening:   PHQ-2 Score: 0      Fall Risk Screening: In the past year, patient has experienced: no history of falling in past year      Urinary Incontinence Screening:   Patient has not leaked urine accidently in the last six months  Home Safety:  Patient does not have trouble with stairs inside or outside of their home  Patient has working smoke alarms and has working carbon monoxide detector  Home safety hazards include: uneven floors  Nutrition:   Current diet is Low Carb  Medications:   Patient is currently taking over-the-counter supplements  OTC medications include: Women's Multi  Vitamin  Patient is able to manage medications  Activities of Daily Living (ADLs)/Instrumental Activities of Daily Living (IADLs):   Walk and transfer into and out of bed and chair?: Yes  Dress and groom yourself?: Yes    Bathe or shower yourself?: Yes    Feed yourself?  Yes  Do your laundry/housekeeping?: Yes  Manage your money, pay your bills and track your expenses?: Yes  Make your own meals?: Yes    Do your own shopping?: Yes    Previous Hospitalizations:   Any hospitalizations or ED visits within the last 12 months?: No      Advance Care Planning:   Living will: Yes    Durable POA for healthcare: Yes    Advanced directive: Yes      PREVENTIVE SCREENINGS      Cardiovascular Screening:    General: History Lipid Disorder and Screening Current      Diabetes Screening:     General: History Diabetes and Screening Current      Colorectal Cancer Screening:     General: Screening Current      Breast Cancer Screening:     General: Screening Current      Cervical Cancer Screening:    General: Screening Not Indicated      Lung Cancer Screening:     General: Screening Not Indicated    Screening, Brief Intervention, and Referral to Treatment (SBIRT)    Screening  Typical number of drinks in a day: 0  Typical number of drinks in a week: 0  Interpretation: Low risk drinking behavior  AUDIT-C Screenin) How often did you have a drink containing alcohol in the past year? monthly or less  2) How many drinks did you have on a typical day when you were drinking in the past year?  3 to 4  3) How often did you have 6 or more drinks on one occasion in the past year? never    AUDIT-C Score: 1  Interpretation: Score 0-2 (female): Negative screen for alcohol misuse    Single Item Drug Screening:  How often have you used an illegal drug (including marijuana) or a prescription medication for non-medical reasons in the past year? never    Single Item Drug Screen Score: 0  Interpretation: Negative screen for possible drug use disorder      Glenn Talamantes MD

## 2022-04-18 NOTE — ASSESSMENT & PLAN NOTE
Epigastric pain  Patient states for the past 30 years she has experience left abdominal discomfort that is only resolved by eating  She does feel more comfortable when lying down as well  Patient will proceed with colonoscopy later this year    Patient refused GI consultation at this time

## 2022-04-18 NOTE — ASSESSMENT & PLAN NOTE
Insomnia  At long discussion with the patient regarding treatment options  She was given prescription to initiate trazodone 50 mg at bedtime  She will reduce her Benadryl to 50 mg at bedtime  After 1 week she will reduce Benadryl to 25 mg and after 2 weeks discontinue Benadryl altogether    If patient continues to have difficulties with sleep she will call the office and we will titrate trazodone as indicated

## 2022-04-18 NOTE — PROGRESS NOTES
FAMILY PRACTICE OFFICE VISIT       NAME: Mishel Singh  AGE: 79 y o  SEX: female       : 1955        MRN: [id number]    DATE: 2022  TIME: 12:58 PM    Assessment and Plan     Problem List Items Addressed This Visit        Endocrine    Hypothyroidism - Primary     Hypothyroidism  Patient was given prescription to lower her levothyroxine to 150 mcg tablet once daily  She will have follow-up blood work prior to next office visit         Relevant Medications    levothyroxine 150 mcg tablet    Other Relevant Orders    Hemoglobin A1C    TSH, 3rd generation    Comprehensive metabolic panel    Lipid panel    Type 2 diabetes mellitus ([facility] [address] )     Diabetes  Patient was recommended to increase her physical activity this summer in attempts to lower blood sugars  Her eye and foot exams are up-to-date  Lab Results   Component Value Date    HGBA1C 6 1 (H) 2022            Relevant Orders    Hemoglobin A1C    TSH, 3rd generation    Comprehensive metabolic panel    Lipid panel       Other    Epigastric pain     Epigastric pain  Patient states for the past 30 years she has experience left abdominal discomfort that is only resolved by eating  She does feel more comfortable when lying down as well  Patient will proceed with colonoscopy later this year  Patient refused GI consultation at this time         Insomnia     Insomnia  At long discussion with the patient regarding treatment options  She was given prescription to initiate trazodone 50 mg at bedtime  She will reduce her Benadryl to 50 mg at bedtime  After 1 week she will reduce Benadryl to 25 mg and after 2 weeks discontinue Benadryl altogether    If patient continues to have difficulties with sleep she will call the office and we will titrate trazodone as indicated         Relevant Medications    traZODone (DESYREL) 50 mg tablet              Chief Complaint     Chief Complaint   Patient presents with    Medicare Wellness Visit    foot exam shoes & socks remove        History of Present Illness     Patient the office to review chronic medical condition  I reviewed her most recent blood test results  Patient states she has not been as active as usual due to winter months  She attributes her increase in cholesterol and A1c in having to eat on a fairly regular basis otherwise she experiences left abdominal discomfort and cramping  She denies any changes in bowel movements  She states these symptoms have been present for 30 years  Patient had colonoscopy in 2017 that was unremarkable  She is due for follow-up colonoscopy later this year  Patient also describes difficulties with chronic insomnia  She has been using  mg of Benadryl over-the-counter in a sleeping aid however she does not feel that this works adequately  She often awakens and has trouble shutting down her mind      Review of Systems   Review of Systems   Constitutional: Positive for unexpected weight change  HENT: Negative  Eyes: Negative  Respiratory: Negative  Cardiovascular: Negative  Gastrointestinal: Negative  Endocrine: Negative  Genitourinary: Negative  Musculoskeletal: Negative  Skin: Negative  Allergic/Immunologic: Negative  Neurological: Negative  Hematological: Negative  Psychiatric/Behavioral: Positive for sleep disturbance         Active Problem List     Patient Active Problem List   Diagnosis    Hyperlipidemia    Hypothyroidism    Onychomycosis    Rectocele    Hypoglycemia    Type 2 diabetes mellitus ([address]    Epigastric pain    Insomnia       Past Medical History:  Past Medical History:   Diagnosis Date    Diabetes mellitus ([address]     Disease of thyroid gland     hypo    Hyperlipidemia     Hypertension     Insomnia     Rectocele     Screening for colon cancer     Skin cancer        Past Surgical History:  Past Surgical History:   Procedure Laterality Date    CHOLECYSTECTOMY      GASTRECTOMY  2001    SD COLONOSCOPY FLX DX W/COLLJ SPEC WHEN PFRMD N/A 10/31/2017    Procedure: COLONOSCOPY with bx and tatoo at 20cm, polypectomies x2;  Surgeon: Ahsan Savage MD;  Location: [facility];   Service: General    SKIN CANCER EXCISION      left neck areal; left upper arm; back       Family History:  Family History   Problem Relation Age of Onset    Dementia Mother     Hypertension Father     Melanoma Son     No Known Problems Sister     No Known Problems Daughter     No Known Problems Maternal Grandmother     No Known Problems Maternal Grandfather     No Known Problems Paternal Grandmother     No Known Problems Paternal Grandfather     No Known Problems Sister     No Known Problems Sister     No Known Problems Sister     No Known Problems Maternal Aunt     No Known Problems Maternal Aunt     No Known Problems Paternal Aunt        Social History:  Social History     Socioeconomic History    Marital status: /Civil Union     Spouse name: Not on file    Number of children: 2    Years of education: Not on file    Highest education level: Not on file   Occupational History    Not on file   Tobacco Use    Smoking status: Never Smoker    Smokeless tobacco: Never Used   Vaping Use    Vaping Use: Never used   Substance and Sexual Activity    Alcohol use: Yes     Comment: social    Drug use: No    Sexual activity: Not Currently   Other Topics Concern    Not on file   Social History Narrative    Caffeine use     Social Determinants of Health     Financial Resource Strain: Not on file   Food Insecurity: Not on file   Transportation Needs: Not on file   Physical Activity: Not on file   Stress: Not on file   Social Connections: Not on file   Intimate Partner Violence: Not on file   Housing Stability: Not on file       Objective     Vitals:    04/18/22 0828   BP: 138/78   Pulse:    Resp:    Temp:    SpO2:      Wt Readings from Last 3 Encounters:   04/18/22 75 3 kg (166 lb)   10/27/21 65 8 kg (145 lb) 04/12/21 76 7 kg (169 lb 3 2 oz)       Physical Exam  Constitutional:       General: She is not in acute distress  Appearance: Normal appearance  She is not ill-appearing  HENT:      Head: Normocephalic and atraumatic  Eyes:      General:         Right eye: No discharge  Left eye: No discharge  Extraocular Movements: Extraocular movements intact  Conjunctiva/sclera: Conjunctivae normal       Pupils: Pupils are equal, round, and reactive to light  Neck:      Vascular: No carotid bruit  Cardiovascular:      Rate and Rhythm: Normal rate and regular rhythm  Pulses: no weak pulses          Dorsalis pedis pulses are 2+ on the right side and 2+ on the left side  Posterior tibial pulses are 2+ on the right side and 2+ on the left side  Heart sounds: Normal heart sounds  No murmur heard  Pulmonary:      Effort: Pulmonary effort is normal       Breath sounds: Normal breath sounds  No wheezing, rhonchi or rales  Abdominal:      General: Abdomen is flat  Bowel sounds are normal  There is no distension  Palpations: Abdomen is soft  Tenderness: There is no abdominal tenderness  There is no guarding or rebound  Musculoskeletal:      Right lower leg: No edema  Left lower leg: No edema  Feet:      Right foot:      Skin integrity: No ulcer, skin breakdown, erythema, warmth, callus or dry skin  Left foot:      Skin integrity: No ulcer, skin breakdown, erythema, warmth, callus or dry skin  Lymphadenopathy:      Cervical: No cervical adenopathy  Skin:     Findings: No rash  Neurological:      General: No focal deficit present  Mental Status: She is alert and oriented to person, place, and time  Cranial Nerves: No cranial nerve deficit  Psychiatric:         Mood and Affect: Mood normal          Behavior: Behavior normal          Thought Content:  Thought content normal          Judgment: Judgment normal        Patient's shoes and socks removed  Right Foot/Ankle   Right Foot Inspection  Skin Exam: skin normal and skin intact  No dry skin, no warmth, no callus, no erythema, no maceration, no abnormal color, no pre-ulcer, no ulcer and no callus  Toe Exam: ROM and strength within normal limits  Sensory   Vibration: intact  Monofilament testing: intact    Vascular  The right DP pulse is 2+  The right PT pulse is 2+  Left Foot/Ankle  Left Foot Inspection  Skin Exam: skin normal and skin intact  No dry skin, no warmth, no erythema, no maceration, normal color, no pre-ulcer, no ulcer and no callus  Toe Exam: ROM and strength within normal limits  Sensory   Vibration: intact  Monofilament testing: intact    Vascular  The left DP pulse is 2+  The left PT pulse is 2+       Assign Risk Category  No deformity present  No loss of protective sensation  No weak pulses  Risk: 0      Pertinent Laboratory/Diagnostic Studies:  Lab Results   Component Value Date    BUN 20 04/05/2022    CREATININE 0 88 04/05/2022    CALCIUM 9 5 04/05/2022     06/15/2016    K 4 5 04/05/2022    CO2 30 04/05/2022     04/05/2022     Lab Results   Component Value Date    ALT 68 04/05/2022    AST 33 04/05/2022    ALKPHOS 97 04/05/2022    BILITOT 0 5 06/15/2016       Lab Results   Component Value Date    WBC 3 90 (L) 04/05/2022    HGB 13 4 04/05/2022    HCT 43 6 04/05/2022    MCV 93 04/05/2022     04/05/2022       Lab Results   Component Value Date    TSH 7 50 (H) 03/12/2020       Lab Results   Component Value Date    CHOL 177 06/15/2016     Lab Results   Component Value Date    TRIG 120 04/05/2022     Lab Results   Component Value Date    HDL 74 04/05/2022     Lab Results   Component Value Date    LDLCALC 122 (H) 04/05/2022     Lab Results   Component Value Date    HGBA1C 6 1 (H) 04/05/2022       Results for orders placed or performed in visit on 04/05/22   CBC   Result Value Ref Range    WBC 3 90 (L) 4 31 - 10 16 Thousand/uL    RBC 4 69 3 81 - 5 12 Million/uL    Hemoglobin 13 4 11 5 - 15 4 g/dL    Hematocrit 43 6 34 8 - 46 1 %    MCV 93 82 - 98 fL    MCH 28 6 26 8 - 34 3 pg    MCHC 30 7 (L) 31 4 - 37 4 g/dL    RDW 13 6 11 6 - 15 1 %    Platelets 462 110 - 418 Thousands/uL    MPV 10 9 8 9 - 12 7 fL   Comprehensive metabolic panel   Result Value Ref Range    Sodium 141 136 - 145 mmol/L    Potassium 4 5 3 5 - 5 3 mmol/L    Chloride 108 100 - 108 mmol/L    CO2 30 21 - 32 mmol/L    ANION GAP 3 (L) 4 - 13 mmol/L    BUN 20 5 - 25 mg/dL    Creatinine 0 88 0 60 - 1 30 mg/dL    Glucose, Fasting 104 (H) 65 - 99 mg/dL    Calcium 9 5 8 3 - 10 1 mg/dL    AST 33 5 - 45 U/L    ALT 68 12 - 78 U/L    Alkaline Phosphatase 97 46 - 116 U/L    Total Protein 6 8 6 4 - 8 2 g/dL    Albumin 3 6 3 5 - 5 0 g/dL    Total Bilirubin 0 41 0 20 - 1 00 mg/dL    eGFR 68 ml/min/1 73sq m   Lipid panel   Result Value Ref Range    Cholesterol 220 (H) See Comment mg/dL    Triglycerides 120 See Comment mg/dL    HDL, Direct 74 >=50 mg/dL    LDL Calculated 122 (H) 0 - 100 mg/dL    Non-HDL-Chol (CHOL-HDL) 146 mg/dl   TSH, 3rd generation   Result Value Ref Range    TSH 3RD GENERATON 0 248 (L) 0 450 - 4 500 uIU/mL   Hemoglobin A1C   Result Value Ref Range    Hemoglobin A1C 6 1 (H) Normal 3 8-5 6%; PreDiabetic 5 7-6 4%; Diabetic >=6 5%; Glycemic control for adults with diabetes <7 0% %     mg/dl   Microalbumin / creatinine urine ratio   Result Value Ref Range    Creatinine, Ur 18 0 mg/dL    Microalbum  ,U,Random 9 2 0 0 - 20 0 mg/L    Microalb Creat Ratio 51 (H) 0 - 30 mg/g creatinine       Orders Placed This Encounter   Procedures    Hemoglobin A1C    TSH, 3rd generation    Comprehensive metabolic panel    Lipid panel       ALLERGIES:  No Known Allergies    Current Medications     Current Outpatient Medications   Medication Sig Dispense Refill    levothyroxine 150 mcg tablet Take 1 tablet (150 mcg total) by mouth daily 90 tablet 1    Magnesium Hydroxide (MAGNESIA PO) Take 500 mg by mouth in the morning      Multiple Vitamins-Minerals (MULTIVITAMIN WOMEN 50+ PO) Take 1 caplet by mouth daily      traZODone (DESYREL) 50 mg tablet Take 1 tablet (50 mg total) by mouth daily at bedtime 30 tablet 2     No current facility-administered medications for this visit  Health Maintenance     Health Maintenance   Topic Date Due    Hepatitis C Screening  Never done    Pneumococcal Vaccine: 65+ Years (1 of 2 - PPSV23) Never done    DTaP,Tdap,and Td Vaccines (1 - Tdap) Never done    Osteoporosis Screening  Never done    Cervical Cancer Screening  10/28/2021    BMI: Followup Plan  04/12/2022    Medicare Annual Wellness Visit (AWV)  04/12/2022    HEMOGLOBIN A1C  10/05/2022    Colorectal Cancer Screening  10/31/2022    URINE MICROALBUMIN  04/05/2023    Fall Risk  04/18/2023    Depression Screening  04/18/2023    BMI: Adult  04/18/2023    Diabetic Foot Exam  04/18/2023    Breast Cancer Screening: Mammogram  10/27/2023    DM Eye Exam  03/21/2024    Influenza Vaccine  Completed    COVID-19 Vaccine  Completed    HIB Vaccine  Aged Out    Hepatitis B Vaccine  Aged Out    IPV Vaccine  Aged Out    Hepatitis A Vaccine  Aged Out    Meningococcal ACWY Vaccine  Aged Out    HPV Vaccine  Aged Out     Immunization History   Administered Date(s) Administered    COVID-19 PFIZER VACCINE 0 3 ML IM 03/24/2021, 04/15/2021, 10/26/2021    COVID-19 Pfizer vac (Stefano-sucrose, gray cap) 12 yr+ IM 04/01/2022    COVID-19, unspecified 04/01/2022    INFLUENZA 10/05/2020, 10/26/2021    Influenza Quadrivalent Preservative Free 3 years and older IM 11/24/2015, 09/25/2017    Influenza, injectable, quadrivalent, preservative free 0 5 mL 10/15/2018, 09/23/2019    Influenza, seasonal, injectable 01/07/2013, 14/87/6957       Leta Quintanilla MD    I spent 30 minutes with this patient which greater than 50% was spent counseling or reviewing chart

## 2022-04-18 NOTE — ASSESSMENT & PLAN NOTE
Diabetes  Patient was recommended to increase her physical activity this summer in attempts to lower blood sugars  Her eye and foot exams are up-to-date    Lab Results   Component Value Date    HGBA1C 6 1 (H) 04/05/2022

## 2022-05-02 ENCOUNTER — TELEPHONE (OUTPATIENT)
Dept: FAMILY MEDICINE CLINIC | Facility: CLINIC | Age: 67
End: 2022-05-02

## 2022-05-03 NOTE — TELEPHONE ENCOUNTER
Patient may increase to take 1 and 1/2 tablet at bedtime  If symptoms do not improve over the 1st week she may increase to take 2 tablets at bedtime

## 2022-05-12 ENCOUNTER — TELEPHONE (OUTPATIENT)
Dept: FAMILY MEDICINE CLINIC | Facility: CLINIC | Age: 67
End: 2022-05-12

## 2022-05-20 ENCOUNTER — TELEPHONE (OUTPATIENT)
Dept: FAMILY MEDICINE CLINIC | Facility: CLINIC | Age: 67
End: 2022-05-20

## 2022-05-20 DIAGNOSIS — G47.00 INSOMNIA, UNSPECIFIED TYPE: ICD-10-CM

## 2022-05-20 RX ORDER — TRAZODONE HYDROCHLORIDE 100 MG/1
100 TABLET ORAL
Qty: 90 TABLET | Refills: 1 | Status: SHIPPED | OUTPATIENT
Start: 2022-05-20 | End: 2022-09-26 | Stop reason: SDUPTHER

## 2022-05-20 NOTE — TELEPHONE ENCOUNTER
Per encounter on 5/3/22, pt was to increase her Trazadone 50 mg to 100mg  Patient needs a new/updated script sent to [facility] in [facility] for Trazadone 100mg  Pt requesting 90-day supply

## 2022-07-15 ENCOUNTER — TELEPHONE (OUTPATIENT)
Dept: FAMILY MEDICINE CLINIC | Facility: CLINIC | Age: 67
End: 2022-07-15

## 2022-07-15 ENCOUNTER — CLINICAL SUPPORT (OUTPATIENT)
Dept: FAMILY MEDICINE CLINIC | Facility: CLINIC | Age: 67
End: 2022-07-15
Payer: COMMERCIAL

## 2022-07-15 DIAGNOSIS — R31.9 URINARY TRACT INFECTION WITH HEMATURIA, SITE UNSPECIFIED: Primary | ICD-10-CM

## 2022-07-15 DIAGNOSIS — R82.90 ABNORMAL FINDING IN URINE: Primary | ICD-10-CM

## 2022-07-15 DIAGNOSIS — N39.0 URINARY TRACT INFECTION WITH HEMATURIA, SITE UNSPECIFIED: Primary | ICD-10-CM

## 2022-07-15 LAB
SL AMB  POCT GLUCOSE, UA: ABNORMAL
SL AMB LEUKOCYTE ESTERASE,UA: ABNORMAL
SL AMB POCT BILIRUBIN,UA: ABNORMAL
SL AMB POCT BLOOD,UA: ABNORMAL
SL AMB POCT CLARITY,UA: CLEAR
SL AMB POCT COLOR,UA: YELLOW
SL AMB POCT KETONES,UA: ABNORMAL
SL AMB POCT NITRITE,UA: ABNORMAL
SL AMB POCT PH,UA: 5
SL AMB POCT SPECIFIC GRAVITY,UA: 1.01
SL AMB POCT URINE PROTEIN: ABNORMAL
SL AMB POCT UROBILINOGEN: ABNORMAL

## 2022-07-15 PROCEDURE — 3061F NEG MICROALBUMINURIA REV: CPT

## 2022-07-15 PROCEDURE — 81002 URINALYSIS NONAUTO W/O SCOPE: CPT

## 2022-07-15 PROCEDURE — 87086 URINE CULTURE/COLONY COUNT: CPT | Performed by: FAMILY MEDICINE

## 2022-07-15 PROCEDURE — 99211 OFF/OP EST MAY X REQ PHY/QHP: CPT

## 2022-07-15 PROCEDURE — 87181 SC STD AGAR DILUTION PER AGT: CPT | Performed by: FAMILY MEDICINE

## 2022-07-15 PROCEDURE — 87077 CULTURE AEROBIC IDENTIFY: CPT | Performed by: FAMILY MEDICINE

## 2022-07-15 PROCEDURE — 87186 SC STD MICRODIL/AGAR DIL: CPT | Performed by: FAMILY MEDICINE

## 2022-07-15 RX ORDER — SULFAMETHOXAZOLE AND TRIMETHOPRIM 800; 160 MG/1; MG/1
1 TABLET ORAL EVERY 12 HOURS SCHEDULED
Qty: 10 TABLET | Refills: 0 | Status: SHIPPED | OUTPATIENT
Start: 2022-07-15 | End: 2022-07-20

## 2022-07-18 ENCOUNTER — TELEPHONE (OUTPATIENT)
Dept: FAMILY MEDICINE CLINIC | Facility: CLINIC | Age: 67
End: 2022-07-18

## 2022-07-18 LAB
BACTERIA UR CULT: ABNORMAL
BACTERIA UR CULT: ABNORMAL

## 2022-07-18 NOTE — TELEPHONE ENCOUNTER
----- Message from Berry Blanco MD sent at 1/07/7081  5:26 PM EDT -----  I will send in med for possible uti

## 2022-07-18 NOTE — TELEPHONE ENCOUNTER
----- Message from Judith Patel MD sent at 1/53/0366  6:02 AM EDT -----  Urine culture was positive however antibiotic chosen for her should take care bacteria    If symptoms persist after medication completed she should repeat urinalysis in the office

## 2022-07-18 NOTE — TELEPHONE ENCOUNTER
Woody Perez MD   1/58/0687  6:02 AM EDT Back to Top        Urine culture was positive however antibiotic chosen for her should take care bacteria   If symptoms persist after medication completed she should repeat urinalysis in the office     Spoke with patient  Made aware of results and provider's instructions  Patient verbalized understanding and was agreeable w/ plan

## 2022-09-22 ENCOUNTER — APPOINTMENT (OUTPATIENT)
Dept: LAB | Facility: CLINIC | Age: 67
End: 2022-09-22
Payer: COMMERCIAL

## 2022-09-22 DIAGNOSIS — E03.9 HYPOTHYROIDISM, UNSPECIFIED TYPE: ICD-10-CM

## 2022-09-22 DIAGNOSIS — E11.9 TYPE 2 DIABETES MELLITUS WITHOUT COMPLICATION, WITHOUT LONG-TERM CURRENT USE OF INSULIN (HCC): ICD-10-CM

## 2022-09-22 LAB
ALBUMIN SERPL BCP-MCNC: 3.5 G/DL (ref 3.5–5)
ALP SERPL-CCNC: 126 U/L (ref 46–116)
ALT SERPL W P-5'-P-CCNC: 36 U/L (ref 12–78)
ANION GAP SERPL CALCULATED.3IONS-SCNC: 7 MMOL/L (ref 4–13)
AST SERPL W P-5'-P-CCNC: 19 U/L (ref 5–45)
BILIRUB SERPL-MCNC: 0.44 MG/DL (ref 0.2–1)
BUN SERPL-MCNC: 17 MG/DL (ref 5–25)
CALCIUM SERPL-MCNC: 9.3 MG/DL (ref 8.3–10.1)
CHLORIDE SERPL-SCNC: 107 MMOL/L (ref 96–108)
CHOLEST SERPL-MCNC: 230 MG/DL
CO2 SERPL-SCNC: 26 MMOL/L (ref 21–32)
CREAT SERPL-MCNC: 0.95 MG/DL (ref 0.6–1.3)
EST. AVERAGE GLUCOSE BLD GHB EST-MCNC: 134 MG/DL
GFR SERPL CREATININE-BSD FRML MDRD: 62 ML/MIN/1.73SQ M
GLUCOSE P FAST SERPL-MCNC: 107 MG/DL (ref 65–99)
HBA1C MFR BLD: 6.3 %
HDLC SERPL-MCNC: 70 MG/DL
LDLC SERPL CALC-MCNC: 132 MG/DL (ref 0–100)
NONHDLC SERPL-MCNC: 160 MG/DL
POTASSIUM SERPL-SCNC: 4.6 MMOL/L (ref 3.5–5.3)
PROT SERPL-MCNC: 7 G/DL (ref 6.4–8.4)
SODIUM SERPL-SCNC: 140 MMOL/L (ref 135–147)
TRIGL SERPL-MCNC: 140 MG/DL
TSH SERPL DL<=0.05 MIU/L-ACNC: 1.09 UIU/ML (ref 0.45–4.5)

## 2022-09-22 PROCEDURE — 84443 ASSAY THYROID STIM HORMONE: CPT

## 2022-09-22 PROCEDURE — 36415 COLL VENOUS BLD VENIPUNCTURE: CPT

## 2022-09-22 PROCEDURE — 80053 COMPREHEN METABOLIC PANEL: CPT

## 2022-09-22 PROCEDURE — 3044F HG A1C LEVEL LT 7.0%: CPT | Performed by: FAMILY MEDICINE

## 2022-09-22 PROCEDURE — 80061 LIPID PANEL: CPT

## 2022-09-22 PROCEDURE — 83036 HEMOGLOBIN GLYCOSYLATED A1C: CPT

## 2022-09-26 ENCOUNTER — OFFICE VISIT (OUTPATIENT)
Dept: FAMILY MEDICINE CLINIC | Facility: CLINIC | Age: 67
End: 2022-09-26
Payer: COMMERCIAL

## 2022-09-26 VITALS
HEIGHT: 64 IN | DIASTOLIC BLOOD PRESSURE: 72 MMHG | SYSTOLIC BLOOD PRESSURE: 118 MMHG | RESPIRATION RATE: 16 BRPM | TEMPERATURE: 97.6 F | WEIGHT: 176.6 LBS | HEART RATE: 68 BPM | BODY MASS INDEX: 30.15 KG/M2

## 2022-09-26 DIAGNOSIS — G47.00 INSOMNIA, UNSPECIFIED TYPE: ICD-10-CM

## 2022-09-26 DIAGNOSIS — Z23 NEED FOR INFLUENZA VACCINATION: Primary | ICD-10-CM

## 2022-09-26 DIAGNOSIS — E03.9 HYPOTHYROIDISM, UNSPECIFIED TYPE: ICD-10-CM

## 2022-09-26 DIAGNOSIS — R63.5 WEIGHT GAIN: ICD-10-CM

## 2022-09-26 DIAGNOSIS — E11.9 TYPE 2 DIABETES MELLITUS WITHOUT COMPLICATION, WITHOUT LONG-TERM CURRENT USE OF INSULIN (HCC): ICD-10-CM

## 2022-09-26 PROCEDURE — G0008 ADMIN INFLUENZA VIRUS VAC: HCPCS

## 2022-09-26 PROCEDURE — 1160F RVW MEDS BY RX/DR IN RCRD: CPT | Performed by: FAMILY MEDICINE

## 2022-09-26 PROCEDURE — 90662 IIV NO PRSV INCREASED AG IM: CPT

## 2022-09-26 PROCEDURE — 3725F SCREEN DEPRESSION PERFORMED: CPT | Performed by: FAMILY MEDICINE

## 2022-09-26 PROCEDURE — 99214 OFFICE O/P EST MOD 30 MIN: CPT | Performed by: FAMILY MEDICINE

## 2022-09-26 RX ORDER — TRAZODONE HYDROCHLORIDE 100 MG/1
TABLET ORAL
Qty: 135 TABLET | Refills: 1 | Status: SHIPPED | OUTPATIENT
Start: 2022-09-26

## 2022-09-26 RX ORDER — LEVOTHYROXINE SODIUM 0.15 MG/1
150 TABLET ORAL DAILY
Qty: 90 TABLET | Refills: 1 | Status: SHIPPED | OUTPATIENT
Start: 2022-09-26

## 2022-09-26 RX ORDER — PHENTERMINE HYDROCHLORIDE 15 MG/1
15 CAPSULE ORAL EVERY MORNING
Qty: 30 CAPSULE | Refills: 0 | Status: SHIPPED | OUTPATIENT
Start: 2022-09-26 | End: 2022-10-24 | Stop reason: SDUPTHER

## 2022-09-26 NOTE — ASSESSMENT & PLAN NOTE
Diabetes  Patient's A1c stable at 6 3  She will try to make better dietary choices to improve this value  He was recommended to initiate a walking regimen of 20 minutes 3 times a week    Her foot exam is up-to-date  Lab Results   Component Value Date    HGBA1C 6 3 (H) 09/22/2022

## 2022-09-26 NOTE — ASSESSMENT & PLAN NOTE
Weight gain  Patient given prescription to initiate phentermine 15 mg 1 p o  q day  she will have follow-up office visit in 2 months to monitor blood pressure

## 2022-09-26 NOTE — PROGRESS NOTES
FAMILY PRACTICE OFFICE VISIT       NAME: Abdelrahman Patino  AGE: 79 y o  SEX: female       : 1955        MRN: [id number]    DATE: 2022  TIME: 9:03 AM    Assessment and Plan     Problem List Items Addressed This Visit        Endocrine    Hypothyroidism     Hypothyroidism  TSH is stable on current dose of levothyroxine         Relevant Medications    levothyroxine 150 mcg tablet    Type 2 diabetes mellitus ([address] )     Diabetes  Patient's A1c stable at 6 3  She will try to make better dietary choices to improve this value  He was recommended to initiate a walking regimen of 20 minutes 3 times a week  Her foot exam is up-to-date  Lab Results   Component Value Date    HGBA1C 6 3 (H) 2022               Other    Insomnia     Insomnia  Patient was given prescription to increase trazodone to 150 mg at bedtime  Relevant Medications    traZODone (DESYREL) 100 mg tablet    Weight gain     Weight gain  Patient given prescription to initiate phentermine 15 mg 1 p o  q day  she will have follow-up office visit in 2 months to monitor blood pressure  Relevant Medications    phentermine 15 MG capsule      Other Visit Diagnoses     Need for influenza vaccination    -  Primary    Relevant Orders    influenza vaccine, high-dose, PF 0 7 mL (FLUZONE HIGH-DOSE) (Completed)              Chief Complaint     Chief Complaint   Patient presents with    Discuss weight     Pt concern about current weight gain, no changes to diet or meds  History of Present Illness     Patient the office to review chronic medical condition  She denies any recent illness  I reviewed her most recent blood test results  Patient states trazodone has helped her fall asleep very well however she could only sleep approximately 4 hours before she is awakened and has trouble falling back to sleep  At time she will use over-the-counter Benadryl to assist with her insomnia  Patient also has had increased weight gain  She she mentions she has trouble staying on her diet and can only be consistent with dieting for short appeared of time before she makes poor dietary choices  She does not have a regular form of exercise at this time although 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  Review of Systems   Review of Systems   Constitutional: Positive for unexpected weight change  HENT: Negative  Eyes: Negative  Respiratory: Negative  Cardiovascular: Negative  Gastrointestinal: Negative  Genitourinary: Negative  Musculoskeletal: Negative  Skin: Negative  Neurological: Negative  Psychiatric/Behavioral: Positive for sleep disturbance  Active Problem List     Patient Active Problem List   Diagnosis    Hyperlipidemia    Hypothyroidism    Onychomycosis    Rectocele    Hypoglycemia    Type 2 diabetes mellitus ([facility] )    Epigastric pain    Insomnia    Weight gain       Past Medical History:  Past Medical History:   Diagnosis Date    Diabetes mellitus ([place] [address] )     Disease of thyroid gland     hypo    Hyperlipidemia     Hypertension     Insomnia     Rectocele     Screening for colon cancer     Skin cancer        Past Surgical History:  Past Surgical History:   Procedure Laterality Date    CHOLECYSTECTOMY      GASTRECTOMY  2001    OR COLONOSCOPY FLX DX W/COLLJ SPEC WHEN PFRMD N/A 10/31/2017    Procedure: COLONOSCOPY with bx and tatoo at 20cm, polypectomies x2;  Surgeon: Anabell Leavitt MD;  Location: [facility];   Service: General    SKIN CANCER EXCISION      left neck areal; left upper arm; back       Family History:  Family History   Problem Relation Age of Onset    Dementia Mother     Hypertension Father     Melanoma Son     No Known Problems Sister     No Known Problems Daughter     No Known Problems Maternal Grandmother     No Known Problems Maternal Grandfather     No Known Problems Paternal Grandmother     No Known Problems Paternal Grandfather     No Known Problems Sister     No Known Problems Sister     No Known Problems Sister     No Known Problems Maternal Aunt     No Known Problems Maternal Aunt     No Known Problems Paternal Aunt        Social History:  Social History     Socioeconomic History    Marital status: /Civil Union     Spouse name: Not on file    Number of children: 2    Years of education: Not on file    Highest education level: Not on file   Occupational History    Not on file   Tobacco Use    Smoking status: Never Smoker    Smokeless tobacco: Never Used   Vaping Use    Vaping Use: Never used   Substance and Sexual Activity    Alcohol use: Yes     Comment: social    Drug use: No    Sexual activity: Not Currently   Other Topics Concern    Not on file   Social History Narrative    Caffeine use     Social Determinants of Health     Financial Resource Strain: Not on file   Food Insecurity: Not on file   Transportation Needs: Not on file   Physical Activity: Not on file   Stress: Not on file   Social Connections: Not on file   Intimate Partner Violence: Not on file   Housing Stability: Not on file       Objective     Vitals:    09/26/22 0820   BP: 118/72   Pulse: 68   Resp: 16   Temp: 97 6 °F (36 4 °C)     Wt Readings from Last 3 Encounters:   09/26/22 80 1 kg (176 lb 9 6 oz)   04/18/22 75 3 kg (166 lb)   10/27/21 65 8 kg (145 lb)       Physical Exam  Constitutional:       General: She is not in acute distress  Appearance: Normal appearance  She is not ill-appearing  HENT:      Head: Normocephalic and atraumatic  Eyes:      General:         Right eye: No discharge  Left eye: No discharge  Extraocular Movements: Extraocular movements intact  Conjunctiva/sclera: Conjunctivae normal       Pupils: Pupils are equal, round, and reactive to light  Neck:      Vascular: No carotid bruit  Cardiovascular:      Rate and Rhythm: Normal rate and regular rhythm        Pulses: no weak pulses          Dorsalis pedis pulses are 2+ on the right side and 2+ on the left side  Posterior tibial pulses are 2+ on the right side and 2+ on the left side  Heart sounds: Normal heart sounds  No murmur heard  Pulmonary:      Effort: Pulmonary effort is normal       Breath sounds: Normal breath sounds  No wheezing, rhonchi or rales  Abdominal:      General: Abdomen is flat  Bowel sounds are normal  There is no distension  Palpations: Abdomen is soft  Tenderness: There is no abdominal tenderness  There is no guarding or rebound  Musculoskeletal:      Right lower leg: No edema  Left lower leg: No edema  Feet:      Right foot:      Skin integrity: No ulcer, skin breakdown, erythema, warmth, callus or dry skin  Left foot:      Skin integrity: No ulcer, skin breakdown, erythema, warmth, callus or dry skin  Lymphadenopathy:      Cervical: No cervical adenopathy  Skin:     Findings: No rash  Neurological:      General: No focal deficit present  Mental Status: She is alert and oriented to person, place, and time  Cranial Nerves: No cranial nerve deficit  Psychiatric:         Mood and Affect: Mood normal          Behavior: Behavior normal          Thought Content: Thought content normal          Judgment: Judgment normal      Patient's shoes and socks removed  Right Foot/Ankle   Right Foot Inspection  Skin Exam: skin normal and skin intact  No dry skin, no warmth, no callus, no erythema, no maceration, no abnormal color, no pre-ulcer, no ulcer and no callus  Toe Exam: ROM and strength within normal limits  Sensory   Vibration: intact  Monofilament testing: intact    Vascular  The right DP pulse is 2+  The right PT pulse is 2+  Left Foot/Ankle  Left Foot Inspection  Skin Exam: skin normal and skin intact  No dry skin, no warmth, no erythema, no maceration, normal color, no pre-ulcer, no ulcer and no callus  Toe Exam: ROM and strength within normal limits       Sensory Vibration: intact  Monofilament testing: intact    Vascular  The left DP pulse is 2+  The left PT pulse is 2+  Assign Risk Category  No deformity present  No loss of protective sensation  No weak pulses  Risk: 0        Pertinent Laboratory/Diagnostic Studies:  Lab Results   Component Value Date    BUN 17 09/22/2022    CREATININE 0 95 09/22/2022    CALCIUM 9 3 09/22/2022     06/15/2016    K 4 6 09/22/2022    CO2 26 09/22/2022     09/22/2022     Lab Results   Component Value Date    ALT 36 09/22/2022    AST 19 09/22/2022    ALKPHOS 126 (H) 09/22/2022    BILITOT 0 5 06/15/2016       Lab Results   Component Value Date    WBC 3 90 (L) 04/05/2022    HGB 13 4 04/05/2022    HCT 43 6 04/05/2022    MCV 93 04/05/2022     04/05/2022       Lab Results   Component Value Date    TSH 7 50 (H) 03/12/2020       Lab Results   Component Value Date    CHOL 177 06/15/2016     Lab Results   Component Value Date    TRIG 140 09/22/2022     Lab Results   Component Value Date    HDL 70 09/22/2022     Lab Results   Component Value Date    LDLCALC 132 (H) 09/22/2022     Lab Results   Component Value Date    HGBA1C 6 3 (H) 09/22/2022       Results for orders placed or performed in visit on 09/22/22   Hemoglobin A1C   Result Value Ref Range    Hemoglobin A1C 6 3 (H) Normal 3 8-5 6%; PreDiabetic 5 7-6 4%;  Diabetic >=6 5%; Glycemic control for adults with diabetes <7 0% %     mg/dl   TSH, 3rd generation   Result Value Ref Range    TSH 3RD GENERATON 1 090 0 450 - 4 500 uIU/mL   Comprehensive metabolic panel   Result Value Ref Range    Sodium 140 135 - 147 mmol/L    Potassium 4 6 3 5 - 5 3 mmol/L    Chloride 107 96 - 108 mmol/L    CO2 26 21 - 32 mmol/L    ANION GAP 7 4 - 13 mmol/L    BUN 17 5 - 25 mg/dL    Creatinine 0 95 0 60 - 1 30 mg/dL    Glucose, Fasting 107 (H) 65 - 99 mg/dL    Calcium 9 3 8 3 - 10 1 mg/dL    AST 19 5 - 45 U/L    ALT 36 12 - 78 U/L    Alkaline Phosphatase 126 (H) 46 - 116 U/L    Total Protein 7 0 6 4 - 8 4 g/dL    Albumin 3 5 3 5 - 5 0 g/dL    Total Bilirubin 0 44 0 20 - 1 00 mg/dL    eGFR 62 ml/min/1 73sq m   Lipid panel   Result Value Ref Range    Cholesterol 230 (H) See Comment mg/dL    Triglycerides 140 See Comment mg/dL    HDL, Direct 70 >=50 mg/dL    LDL Calculated 132 (H) 0 - 100 mg/dL    Non-HDL-Chol (CHOL-HDL) 160 mg/dl       Orders Placed This Encounter   Procedures    influenza vaccine, high-dose, PF 0 7 mL (FLUZONE HIGH-DOSE)       ALLERGIES:  No Known Allergies    Current Medications     Current Outpatient Medications   Medication Sig Dispense Refill    levothyroxine 150 mcg tablet Take 1 tablet (150 mcg total) by mouth daily 90 tablet 1    Magnesium Hydroxide (MAGNESIA PO) Take 500 mg by mouth in the morning      Multiple Vitamins-Minerals (MULTIVITAMIN WOMEN 50+ PO) Take 1 caplet by mouth daily      phentermine 15 MG capsule Take 1 capsule (15 mg total) by mouth every morning 30 capsule 0    Thiamine HCl (VITAMIN B-1 PO) Take 100 mg by mouth in the morning      traZODone (DESYREL) 100 mg tablet 1 & 1/2 tab po q hs 135 tablet 1    VITAMIN D PO Take 50 mg by mouth in the morning       No current facility-administered medications for this visit           Health Maintenance     Health Maintenance   Topic Date Due    Hepatitis C Screening  Never done    Pneumococcal Vaccine: 65+ Years (1 - PCV) Never done    Osteoporosis Screening  Never done    Cervical Cancer Screening  10/28/2021    BMI: Followup Plan  04/12/2022    Colorectal Cancer Screening  10/31/2022    HEMOGLOBIN A1C  03/22/2023    URINE MICROALBUMIN  04/05/2023    Fall Risk  04/18/2023    Urinary Incontinence Screening  04/18/2023    Medicare Annual Wellness Visit (AWV)  04/18/2023    Diabetic Foot Exam  04/18/2023    Depression Screening  09/26/2023    BMI: Adult  09/26/2023    Breast Cancer Screening: Mammogram  10/27/2023    DM Eye Exam  03/21/2024    Influenza Vaccine  Completed    COVID-19 Vaccine  Completed  HIB Vaccine  Aged Out    Hepatitis B Vaccine  Aged Out    IPV Vaccine  Aged Out    Hepatitis A Vaccine  Aged Out    Meningococcal ACWY Vaccine  Aged Out    HPV Vaccine  Aged Out     Immunization History   Administered Date(s) Administered    COVID-19 PFIZER VACCINE 0 3 ML IM 03/24/2021, 04/15/2021, 10/26/2021    COVID-19 Pfizer vac (Stefano-sucrose, gray cap) 12 yr+ IM 04/01/2022    COVID-19, unspecified 04/01/2022    INFLUENZA 10/05/2020, 10/26/2021    Influenza Quadrivalent Preservative Free 3 years and older IM 11/24/2015, 09/25/2017    Influenza, high dose seasonal 0 7 mL 09/26/2022    Influenza, injectable, quadrivalent, preservative free 0 5 mL 10/15/2018, 09/23/2019    Influenza, seasonal, injectable 01/07/2013, 73/75/7187       Berry Blanco MD    I spent 25 minutes with this patient which greater than 50% was spent counseling or review inch

## 2022-10-24 DIAGNOSIS — R63.5 WEIGHT GAIN: ICD-10-CM

## 2022-10-25 RX ORDER — PHENTERMINE HYDROCHLORIDE 15 MG/1
15 CAPSULE ORAL EVERY MORNING
Qty: 30 CAPSULE | Refills: 0 | Status: SHIPPED | OUTPATIENT
Start: 2022-10-25

## 2022-11-18 ENCOUNTER — RA CDI HCC (OUTPATIENT)
Dept: OTHER | Facility: HOSPITAL | Age: 67
End: 2022-11-18

## 2022-11-18 NOTE — PROGRESS NOTES
[facility]  coding opportunities       Chart reviewed, no opportunity found: CHART REVIEWED, NO OPPORTUNITY FOUND        Patients Insurance        Commercial Insurance: [address] [place] [address]

## 2022-11-28 ENCOUNTER — APPOINTMENT (OUTPATIENT)
Dept: LAB | Facility: CLINIC | Age: 67
End: 2022-11-28

## 2022-11-28 ENCOUNTER — OFFICE VISIT (OUTPATIENT)
Dept: FAMILY MEDICINE CLINIC | Facility: CLINIC | Age: 67
End: 2022-11-28

## 2022-11-28 VITALS
HEART RATE: 65 BPM | WEIGHT: 161 LBS | SYSTOLIC BLOOD PRESSURE: 140 MMHG | RESPIRATION RATE: 16 BRPM | TEMPERATURE: 97.6 F | OXYGEN SATURATION: 100 % | BODY MASS INDEX: 27.49 KG/M2 | HEIGHT: 64 IN | DIASTOLIC BLOOD PRESSURE: 90 MMHG

## 2022-11-28 DIAGNOSIS — R10.13 EPIGASTRIC PAIN: ICD-10-CM

## 2022-11-28 DIAGNOSIS — R63.5 WEIGHT GAIN: ICD-10-CM

## 2022-11-28 DIAGNOSIS — R10.13 EPIGASTRIC PAIN: Primary | ICD-10-CM

## 2022-11-28 RX ORDER — PHENTERMINE HYDROCHLORIDE 15 MG/1
15 CAPSULE ORAL EVERY MORNING
Qty: 30 CAPSULE | Refills: 0 | Status: SHIPPED | OUTPATIENT
Start: 2022-11-28

## 2022-11-28 NOTE — ASSESSMENT & PLAN NOTE
Epigastric pain  Patient with vague symptoms of epigastric discomfort not related to particular foods she eats  Foods tend to make symptoms better  She was given prescription to obtain blood work for H pylori as well as referral to [facility] gastroenterology for further evaluation

## 2022-11-28 NOTE — PROGRESS NOTES
FAMILY PRACTICE OFFICE VISIT       NAME: Cameron Alejo  AGE: 79 y o  SEX: female       : 1955        MRN: [id number]    DATE: 2022  TIME: 9:09 AM    Assessment and Plan     Problem List Items Addressed This Visit        Other    Epigastric pain - Primary     Epigastric pain  Patient with vague symptoms of epigastric discomfort not related to particular foods she eats  Foods tend to make symptoms better  She was given prescription to obtain blood work for H pylori as well as referral to [facility] gastroenterology for further evaluation  Relevant Orders    H  pylori antibody, IgG    Ambulatory Referral to Gastroenterology    Weight gain     Weight gain  Patient would like to continue phentermine to get to her goal weight of 150 lb  Was given a refill as requested         Relevant Medications    phentermine 15 MG capsule           Chief Complaint     Chief Complaint   Patient presents with   • Follow-up     2 month   • Night Time Thirst     Began a few weeks ago  Does feel quenched after drinking  • [place] Colonoscopy     Due-please refer   • Gynecologic Exam     Due-will schedule       History of Present Illness     Patient with 2 month follow-up of chronic condition  She has had success at losing weight and has lost about 15 lb with the use of phentermine  She states in the mornings she does not usually have breakfast and medication has helped her abdominal discomfort however later in the day before supper H her left-sided abdominal pain returns which is again subsided by eating  She states symptoms have been present for many many years as early as in her 25s  She denies any changes with bowel movements  Patient states she is consuming 1000 calories per day  Review of Systems   Review of Systems   Constitutional: Negative  Respiratory: Negative  Cardiovascular: Negative  Gastrointestinal: Positive for abdominal pain   Negative for blood in stool, diarrhea and rectal pain  Active Problem List     Patient Active Problem List   Diagnosis   • Hyperlipidemia   • Hypothyroidism   • Onychomycosis   • Rectocele   • Hypoglycemia   • Type 2 diabetes mellitus ([facility] )   • Epigastric pain   • Insomnia   • Weight gain       Past Medical History:  Past Medical History:   Diagnosis Date   • Diabetes mellitus ([facility] )    • Disease of thyroid gland     hypo   • Hyperlipidemia    • Hypertension    • Insomnia    • Rectocele    • Screening for colon cancer    • Skin cancer        Past Surgical History:  Past Surgical History:   Procedure Laterality Date   • CHOLECYSTECTOMY     • GASTRECTOMY  2001   • MI COLONOSCOPY FLX DX W/COLLJ SPEC WHEN PFRMD N/A 10/31/2017    Procedure: COLONOSCOPY with bx and tatoo at 20cm, polypectomies x2;  Surgeon: Gricelda Melendez MD;  Location: [facility];   Service: General   • SKIN CANCER EXCISION      left neck areal; left upper arm; back       Family History:  Family History   Problem Relation Age of Onset   • Dementia Mother    • Hypertension Father    • Melanoma Son    • No Known Problems Sister    • No Known Problems Daughter    • No Known Problems Maternal Grandmother    • No Known Problems Maternal Grandfather    • No Known Problems Paternal Grandmother    • No Known Problems Paternal Grandfather    • No Known Problems Sister    • No Known Problems Sister    • No Known Problems Sister    • No Known Problems Maternal Aunt    • No Known Problems Maternal Aunt    • No Known Problems Paternal Aunt        Social History:  Social History     Socioeconomic History   • Marital status: /Civil Union     Spouse name: Not on file   • Number of children: 2   • Years of education: Not on file   • Highest education level: Not on file   Occupational History   • Not on file   Tobacco Use   • Smoking status: Never   • Smokeless tobacco: Never   Vaping Use   • Vaping Use: Never used   Substance and Sexual Activity   • Alcohol use: Yes     Comment: social   • Drug use: No   • Sexual activity: Not Currently   Other Topics Concern   • Not on file   Social History Narrative    Caffeine use     Social Determinants of Health     Financial Resource Strain: Not on file   Food Insecurity: Not on file   Transportation Needs: Not on file   Physical Activity: Not on file   Stress: Not on file   Social Connections: Not on file   Intimate Partner Violence: Not on file   Housing Stability: Not on file       Objective     Vitals:    11/28/22 0826   BP: 140/90   Pulse:    Resp:    Temp:    SpO2:      Wt Readings from Last 3 Encounters:   11/28/22 73 kg (161 lb)   09/26/22 80 1 kg (176 lb 9 6 oz)   04/18/22 75 3 kg (166 lb)       Physical Exam  Constitutional:       General: She is not in acute distress  Appearance: Normal appearance  She is not ill-appearing  HENT:      Head: Normocephalic and atraumatic  Eyes:      General:         Right eye: No discharge  Left eye: No discharge  Extraocular Movements: Extraocular movements intact  Conjunctiva/sclera: Conjunctivae normal       Pupils: Pupils are equal, round, and reactive to light  Neck:      Vascular: No carotid bruit  Cardiovascular:      Rate and Rhythm: Normal rate and regular rhythm  Heart sounds: Normal heart sounds  No murmur heard  Pulmonary:      Effort: Pulmonary effort is normal       Breath sounds: Normal breath sounds  No wheezing, rhonchi or rales  Abdominal:      General: Abdomen is flat  Bowel sounds are normal  There is no distension  Palpations: Abdomen is soft  Tenderness: There is no abdominal tenderness  There is no guarding or rebound  Musculoskeletal:      Right lower leg: No edema  Left lower leg: No edema  Lymphadenopathy:      Cervical: No cervical adenopathy  Skin:     Findings: No rash  Neurological:      General: No focal deficit present  Mental Status: She is alert and oriented to person, place, and time  Cranial Nerves: No cranial nerve deficit  Psychiatric:         Mood and Affect: Mood normal          Behavior: Behavior normal          Thought Content: Thought content normal          Judgment: Judgment normal          Pertinent Laboratory/Diagnostic Studies:  Lab Results   Component Value Date    BUN 17 09/22/2022    CREATININE 0 95 09/22/2022    CALCIUM 9 3 09/22/2022     06/15/2016    K 4 6 09/22/2022    CO2 26 09/22/2022     09/22/2022     Lab Results   Component Value Date    ALT 36 09/22/2022    AST 19 09/22/2022    ALKPHOS 126 (H) 09/22/2022    BILITOT 0 5 06/15/2016       Lab Results   Component Value Date    WBC 3 90 (L) 04/05/2022    HGB 13 4 04/05/2022    HCT 43 6 04/05/2022    MCV 93 04/05/2022     04/05/2022       Lab Results   Component Value Date    TSH 7 50 (H) 03/12/2020       Lab Results   Component Value Date    CHOL 177 06/15/2016     Lab Results   Component Value Date    TRIG 140 09/22/2022     Lab Results   Component Value Date    HDL 70 09/22/2022     Lab Results   Component Value Date    LDLCALC 132 (H) 09/22/2022     Lab Results   Component Value Date    HGBA1C 6 3 (H) 09/22/2022       Results for orders placed or performed in visit on 09/22/22   Hemoglobin A1C   Result Value Ref Range    Hemoglobin A1C 6 3 (H) Normal 3 8-5 6%; PreDiabetic 5 7-6 4%;  Diabetic >=6 5%; Glycemic control for adults with diabetes <7 0% %     mg/dl   TSH, 3rd generation   Result Value Ref Range    TSH 3RD GENERATON 1 090 0 450 - 4 500 uIU/mL   Comprehensive metabolic panel   Result Value Ref Range    Sodium 140 135 - 147 mmol/L    Potassium 4 6 3 5 - 5 3 mmol/L    Chloride 107 96 - 108 mmol/L    CO2 26 21 - 32 mmol/L    ANION GAP 7 4 - 13 mmol/L    BUN 17 5 - 25 mg/dL    Creatinine 0 95 0 60 - 1 30 mg/dL    Glucose, Fasting 107 (H) 65 - 99 mg/dL    Calcium 9 3 8 3 - 10 1 mg/dL    AST 19 5 - 45 U/L    ALT 36 12 - 78 U/L    Alkaline Phosphatase 126 (H) 46 - 116 U/L    Total Protein 7 0 6 4 - 8 4 g/dL    Albumin 3 5 3 5 - 5 0 g/dL Total Bilirubin 0 44 0 20 - 1 00 mg/dL    eGFR 62 ml/min/1 73sq m   Lipid panel   Result Value Ref Range    Cholesterol 230 (H) See Comment mg/dL    Triglycerides 140 See Comment mg/dL    HDL, Direct 70 >=50 mg/dL    LDL Calculated 132 (H) 0 - 100 mg/dL    Non-HDL-Chol (CHOL-HDL) 160 mg/dl       Orders Placed This Encounter   Procedures   • H  pylori antibody, IgG   • Ambulatory Referral to Gastroenterology       ALLERGIES:  No Known Allergies    Current Medications     Current Outpatient Medications   Medication Sig Dispense Refill   • levothyroxine 150 mcg tablet Take 1 tablet (150 mcg total) by mouth daily 90 tablet 1   • Magnesium Hydroxide (MAGNESIA PO) Take 500 mg by mouth in the morning     • Multiple Vitamins-Minerals (MULTIVITAMIN WOMEN 50+ PO) Take 1 caplet by mouth daily     • phentermine 15 MG capsule Take 1 capsule (15 mg total) by mouth every morning 30 capsule 0   • Thiamine HCl (VITAMIN B-1 PO) Take 100 mg by mouth in the morning     • traZODone (DESYREL) 100 mg tablet 1 & 1/2 tab po q hs 135 tablet 1   • VITAMIN D PO Take 50 mg by mouth in the morning       No current facility-administered medications for this visit           Health Maintenance     Health Maintenance   Topic Date Due   • Hepatitis C Screening  Never done   • Hepatitis B Vaccine (1 of 3 - 3-dose series) Never done   • Pneumococcal Vaccine: 65+ Years (1 - PCV) Never done   • Osteoporosis Screening  Never done   • Cervical Cancer Screening  10/28/2021   • Colorectal Cancer Screening  10/31/2022   • HEMOGLOBIN A1C  03/22/2023   • URINE MICROALBUMIN  04/05/2023   • Fall Risk  04/18/2023   • Urinary Incontinence Screening  04/18/2023   • Medicare Annual Wellness Visit (AWV)  04/18/2023   • Depression Screening  09/26/2023   • Diabetic Foot Exam  09/26/2023   • BMI: Followup Plan  10/25/2023   • Breast Cancer Screening: Mammogram  10/27/2023   • BMI: Adult  11/28/2023   • DM Eye Exam  03/21/2024   • Influenza Vaccine  Completed   • COVID-19 Vaccine  Completed   • HIB Vaccine  Aged Out   • IPV Vaccine  Aged Out   • Hepatitis A Vaccine  Aged Out   • Meningococcal ACWY Vaccine  Aged Out   • HPV Vaccine  Aged Out     Immunization History   Administered Date(s) Administered   • COVID-19 PFIZER VACCINE 0 3 ML IM 03/24/2021, 04/15/2021, 10/26/2021   • COVID-19 Pfizer vac (Stefano-sucrose, gray cap) 12 yr+ IM 04/01/2022   • COVID-19, unspecified 04/01/2022   • INFLUENZA 10/05/2020, 10/26/2021   • Influenza Quadrivalent Preservative Free 3 years and older IM 11/24/2015, 09/25/2017   • Influenza, high dose seasonal 0 7 mL 09/26/2022   • Influenza, injectable, quadrivalent, preservative free 0 5 mL 10/15/2018, 09/23/2019   • Influenza, seasonal, injectable 01/07/2013, 69/04/5735       Alaina Hudson MD

## 2022-11-28 NOTE — ASSESSMENT & PLAN NOTE
Weight gain  Patient would like to continue phentermine to get to her goal weight of 150 lb    Was given a refill as requested

## 2022-11-29 LAB — H PYLORI IGG SER IA-ACNC: 0.56 INDEX VALUE (ref 0–0.79)

## 2022-12-23 ENCOUNTER — CONSULT (OUTPATIENT)
Dept: GASTROENTEROLOGY | Facility: AMBULARY SURGERY CENTER | Age: 67
End: 2022-12-23

## 2022-12-23 VITALS
BODY MASS INDEX: 27.96 KG/M2 | WEIGHT: 163.8 LBS | SYSTOLIC BLOOD PRESSURE: 142 MMHG | RESPIRATION RATE: 18 BRPM | OXYGEN SATURATION: 100 % | DIASTOLIC BLOOD PRESSURE: 90 MMHG | HEART RATE: 68 BPM | HEIGHT: 64 IN

## 2022-12-23 DIAGNOSIS — R10.13 EPIGASTRIC PAIN: ICD-10-CM

## 2022-12-23 DIAGNOSIS — Z12.11 COLON CANCER SCREENING: ICD-10-CM

## 2022-12-23 DIAGNOSIS — R10.12 LUQ PAIN: Primary | ICD-10-CM

## 2022-12-23 RX ORDER — PANTOPRAZOLE SODIUM 40 MG/1
40 TABLET, DELAYED RELEASE ORAL DAILY
Qty: 90 TABLET | Refills: 0 | Status: SHIPPED | OUTPATIENT
Start: 2022-12-23

## 2022-12-23 RX ORDER — SUCRALFATE ORAL 1 G/10ML
1 SUSPENSION ORAL 2 TIMES DAILY
Qty: 600 ML | Refills: 1 | Status: SHIPPED | OUTPATIENT
Start: 2022-12-23 | End: 2022-12-30

## 2022-12-23 NOTE — PATIENT INSTRUCTIONS
Scheduled date of EGD/colonoscopy (as of today):3/17/2023  Physician performing EGD/colonoscopy:DR BURNETT  Location of EGD/colonoscopy:[facility]  Desired bowel prep reviewed with patient:MIRALAX/DULCOLAX PER DR STARR  Instructions reviewed with patient by:DELORES GROVES  Clearances:

## 2022-12-23 NOTE — PROGRESS NOTES
Consultation - [facility]  Gema Chan 79 y o  female MRN: [id number]  Unit/Bed#:  Encounter: [id number]        Consults    ASSESSMENT/PLAN:     1  Left upper quadrant abdominal pain-possibly peptic ulcer disease as symptoms are typically improved with food intake  I suspect she likely has a marginal ulcer in setting of gastric bypass anatomy   -Would recommend starting on pantoprazole 40 mg on daily basis  Would also recommend adding Carafate 1 g twice daily, 2 are separate from other medications  Discussed with patient regarding importance of avoiding all NSAIDs, use acetaminophen if needed  Follow GERD diet  Would recommend EGD for further evaluation  Patient has not had any abdominal imaging in the past few years, would recommend CT of abdomen and pelvis to assess for any pancreatic pathology and to evaluate the bypass stomach  2   Colon cancer screening-overdue, reports having had a colonoscopy 5 years ago which was notable for polyps, reports that she is overdue for colonoscopy at this time  - We will schedule average rescreening colonoscopy at this time  - Patient was explained about the risks and benefits of the procedure  Risks including but not limited to bleeding, infection, perforation were explained in detail  Also explained about less than 100% sensitivity with the exam and other alternatives  -Recommend MiraLAX/Dulcolax prep  3   Follow-up after the procedures and CAT scan       ______________________________________________________________________    Reason for Consult / Principal Problem: [unfilled]    HPI: Gema Chan is a 79y o  year old female with history of diabetes, hypothyroidism, hypertension, hyperlipidemia, gastric bypass many years ago, presents for evaluation of left upper quadrant pain  Patient reports that she has had this pain for many years, reports that it has at least been going on for 3 to 4 years    She reports that the pain is worse when she is hungry, relieved with intake of food  She denies any significant NSAID use  Denies any melena or hematochezia  Patient reports that she follows various diets for weight loss, reports that she is currently doing keto  She reports that laying down sometimes helps with the pain  No change in bowel habits  Denies constipation or diarrhea  Denies any blood in the stool, denies melena  No unintentional weight loss  Denies history of peptic ulcer disease, denies having had EGD in the past even prior to gastric bypass surgery  She reports that her last colonoscopy was over 5 years ago and she is overdue at this time  She reports having had polyps  No Family history of GI malignancy  She reportsbrief period of smoking when she was a teenager  No significant alcohol use  Review of Systems: The remainder of the review of systems was negative except for the pertinent positives noted in HPI  Historical Information   Past Medical History:   Diagnosis Date   • Diabetes mellitus ([facility] [address] )    • Disease of thyroid gland     hypo   • Hyperlipidemia    • Hypertension    • Insomnia    • Rectocele    • Screening for colon cancer    • Skin cancer      Past Surgical History:   Procedure Laterality Date   • CHOLECYSTECTOMY     • GASTRECTOMY  2001   • [place] COLONOSCOPY FLX DX W/COLLJ SPEC WHEN PFRMD N/A 10/31/2017    Procedure: COLONOSCOPY with bx and tatoo at 20cm, polypectomies x2;  Surgeon: Kevin Hooper MD;  Location: [facility];   Service: General   • SKIN CANCER EXCISION      left neck areal; left upper arm; back     Social History   Social History     Substance and Sexual Activity   Alcohol Use Yes    Comment: social     Social History     Substance and Sexual Activity   Drug Use No     Social History     Tobacco Use   Smoking Status Never   Smokeless Tobacco Never     Family History   Problem Relation Age of Onset   • Dementia Mother    • Hypertension Father    • Melanoma Son    • No Known Problems Sister    • No Known Problems Daughter    • No Known Problems Maternal Grandmother    • No Known Problems Maternal Grandfather    • No Known Problems Paternal Grandmother    • No Known Problems Paternal Grandfather    • No Known Problems Sister    • No Known Problems Sister    • No Known Problems Sister    • No Known Problems Maternal Aunt    • No Known Problems Maternal Aunt    • No Known Problems Paternal Aunt        Meds/Allergies     (Not in a hospital admission)    No current facility-administered medications for this visit  No Known Allergies    Objective     Blood pressure 142/90, pulse 68, resp  rate 18, height 5' 4" (1 626 m), weight 74 3 kg (163 lb 12 8 oz), SpO2 100 %  [unfilled]    PHYSICAL EXAM     GEN: well nourished, well developed, no acute distress  HEENT: anicteric, MMM, no cervical or supraclavicular lymphadenopathy  CV: RRR, no m/r/g  CHEST: CTA b/l, no WRR  ABD: +BS, soft, NT/ND, no hepatosplenomegaly  EXT: no c/c/e  SKIN: no rashes,  NEURO: aaox3    Lab Results:   No visits with results within 1 Day(s) from this visit  Latest known visit with results is:   Appointment on 11/28/2022   Component Date Value   • H Pylori IgG 11/28/2022 0 56      Imaging Studies: I have personally reviewed pertinent films in PACS                Answers for HPI/ROS submitted by the patient on 12/22/2022  Chronicity: chronic  Onset: more than 1 year ago  Onset quality: gradual  Frequency: daily  Episode duration: 3 Hours  Progression since onset: waxing and waning  Pain location: left flank  Pain - numeric: 9/10  Pain quality: aching, burning, sharp  Radiates to: does not radiate  anorexia: No  arthralgias: No  belching: No  constipation: No  diarrhea: No  dysuria: No  headaches: No  hematochezia: No  hematuria: No  melena: No  myalgias: No  nausea:  No  weight loss: No  vomiting: No  Aggravated by: deep breathing  Relieved by: eating, recumbency

## 2022-12-23 NOTE — LETTER
December 23, 4874     [address] [place] [address]    Patient: Mona Ledbetter   YOB: 1955   Date of Visit: 12/23/2022       Dear Dr Nehemias Aguilera: Thank you for referring Sotero Parekh to me for evaluation  Below are my notes for this consultation  If you have questions, please do not hesitate to call me  I look forward to following your patient along with you  Sincerely,        Sonia Rangel MD        CC: No Recipients  Sonia Rangel MD  12/23/2022 10:09 AM  Sign when Signing Visit  Consultation - [address] [facility]  Mona Ledbetter 79 y o  female MRN: [id number]  Unit/Bed#:  Encounter: [id number]        Consults    ASSESSMENT/PLAN:     1  Left upper quadrant abdominal pain-possibly peptic ulcer disease as symptoms are typically improved with food intake  I suspect she likely has a marginal ulcer in setting of gastric bypass anatomy   -Would recommend starting on pantoprazole 40 mg on daily basis  Would also recommend adding Carafate 1 g twice daily, 2 are separate from other medications  Discussed with patient regarding importance of avoiding all NSAIDs, use acetaminophen if needed  Follow GERD diet  Would recommend EGD for further evaluation  Patient has not had any abdominal imaging in the past few years, would recommend CT of abdomen and pelvis to assess for any pancreatic pathology and to evaluate the bypass stomach  2   Colon cancer screening-overdue, reports having had a colonoscopy 5 years ago which was notable for polyps, reports that she is overdue for colonoscopy at this time  - We will schedule average rescreening colonoscopy at this time  - Patient was explained about the risks and benefits of the procedure  Risks including but not limited to bleeding, infection, perforation were explained in detail  Also explained about less than 100% sensitivity with the exam and other alternatives    -Recommend MiraLAX/Dulcolax prep     3   Follow-up after the procedures and CAT scan       ______________________________________________________________________    Reason for Consult / Principal Problem: [unfilled]    HPI: Benito Herrera is a 79y o  year old female with history of diabetes, hypothyroidism, hypertension, hyperlipidemia, gastric bypass many years ago, presents for evaluation of left upper quadrant pain  Patient reports that she has had this pain for many years, reports that it has at least been going on for 3 to 4 years  She reports that the pain is worse when she is hungry, relieved with intake of food  She denies any significant NSAID use  Denies any melena or hematochezia  Patient reports that she follows various diets for weight loss, reports that she is currently doing keto  She reports that laying down sometimes helps with the pain  No change in bowel habits  Denies constipation or diarrhea  Denies any blood in the stool, denies melena  No unintentional weight loss  Denies history of peptic ulcer disease, denies having had EGD in the past even prior to gastric bypass surgery  She reports that her last colonoscopy was over 5 years ago and she is overdue at this time  She reports having had polyps  No Family history of GI malignancy  She reportsbrief period of smoking when she was a teenager  No significant alcohol use  Review of Systems: The remainder of the review of systems was negative except for the pertinent positives noted in HPI       Historical Information   Past Medical History:   Diagnosis Date   • Diabetes mellitus ([facility] [address] )    • Disease of thyroid gland     hypo   • Hyperlipidemia    • Hypertension    • Insomnia    • Rectocele    • Screening for colon cancer    • Skin cancer      Past Surgical History:   Procedure Laterality Date   • CHOLECYSTECTOMY     • GASTRECTOMY  2001   • [place] COLONOSCOPY FLX DX W/COLLJ SPEC WHEN PFRMD N/A 10/31/2017    Procedure: COLONOSCOPY with bx and tatoo at 20cm, polypectomies x2;  Surgeon: Dayana Elias MD;  Location: [facility]; Service: General   • SKIN CANCER EXCISION      left neck areal; left upper arm; back     Social History   Social History     Substance and Sexual Activity   Alcohol Use Yes    Comment: social     Social History     Substance and Sexual Activity   Drug Use No     Social History     Tobacco Use   Smoking Status Never   Smokeless Tobacco Never     Family History   Problem Relation Age of Onset   • Dementia Mother    • Hypertension Father    • Melanoma Son    • No Known Problems Sister    • No Known Problems Daughter    • No Known Problems Maternal Grandmother    • No Known Problems Maternal Grandfather    • No Known Problems Paternal Grandmother    • No Known Problems Paternal Grandfather    • No Known Problems Sister    • No Known Problems Sister    • No Known Problems Sister    • No Known Problems Maternal Aunt    • No Known Problems Maternal Aunt    • No Known Problems Paternal Aunt        Meds/Allergies      (Not in a hospital admission)    No current facility-administered medications for this visit  No Known Allergies    Objective      Blood pressure 142/90, pulse 68, resp  rate 18, height 5' 4" (1 626 m), weight 74 3 kg (163 lb 12 8 oz), SpO2 100 %  [unfilled]    PHYSICAL EXAM     GEN: well nourished, well developed, no acute distress  HEENT: anicteric, MMM, no cervical or supraclavicular lymphadenopathy  CV: RRR, no m/r/g  CHEST: CTA b/l, no WRR  ABD: +BS, soft, NT/ND, no hepatosplenomegaly  EXT: no c/c/e  SKIN: no rashes,  NEURO: aaox3    Lab Results:   No visits with results within 1 Day(s) from this visit     Latest known visit with results is:   Appointment on 11/28/2022   Component Date Value   • H Pylori IgG 11/28/2022 0 56      Imaging Studies: I have personally reviewed pertinent films in PACS                Answers for HPI/ROS submitted by the patient on 12/22/2022  Chronicity: chronic  Onset: more than 1 year ago  Onset quality: gradual  Frequency: daily  Episode duration: 3 Hours  Progression since onset: waxing and waning  Pain location: left flank  Pain - numeric: 9/10  Pain quality: aching, burning, sharp  Radiates to: does not radiate  anorexia: No  arthralgias: No  belching: No  constipation: No  diarrhea: No  dysuria: No  headaches: No  hematochezia: No  hematuria: No  melena: No  myalgias: No  nausea:  No  weight loss: No  vomiting: No  Aggravated by: deep breathing  Relieved by: eating, recumbency

## 2022-12-30 DIAGNOSIS — Z12.11 COLON CANCER SCREENING: Primary | ICD-10-CM

## 2022-12-30 DIAGNOSIS — R10.13 EPIGASTRIC PAIN: ICD-10-CM

## 2022-12-30 RX ORDER — SUCRALFATE 1 G/1
1 TABLET ORAL 2 TIMES DAILY
Qty: 60 TABLET | Refills: 1 | Status: SHIPPED | OUTPATIENT
Start: 2022-12-30 | End: 2023-01-29

## 2022-12-30 NOTE — TELEPHONE ENCOUNTER
I ordered tablets, if these are still not covered, can get OTC gaviscon liquid or tablets and use up to four times daily before meals and bedtime

## 2023-01-04 ENCOUNTER — APPOINTMENT (OUTPATIENT)
Dept: LAB | Facility: CLINIC | Age: 68
End: 2023-01-04

## 2023-01-04 DIAGNOSIS — R10.13 EPIGASTRIC PAIN: ICD-10-CM

## 2023-01-04 LAB
ALBUMIN SERPL BCP-MCNC: 3.5 G/DL (ref 3.5–5)
ALP SERPL-CCNC: 110 U/L (ref 46–116)
ALT SERPL W P-5'-P-CCNC: 46 U/L (ref 12–78)
ANION GAP SERPL CALCULATED.3IONS-SCNC: 3 MMOL/L (ref 4–13)
AST SERPL W P-5'-P-CCNC: 23 U/L (ref 5–45)
BILIRUB SERPL-MCNC: 0.49 MG/DL (ref 0.2–1)
BUN SERPL-MCNC: 15 MG/DL (ref 5–25)
CALCIUM SERPL-MCNC: 9.4 MG/DL (ref 8.3–10.1)
CHLORIDE SERPL-SCNC: 105 MMOL/L (ref 96–108)
CO2 SERPL-SCNC: 31 MMOL/L (ref 21–32)
CREAT SERPL-MCNC: 0.94 MG/DL (ref 0.6–1.3)
GFR SERPL CREATININE-BSD FRML MDRD: 62 ML/MIN/1.73SQ M
GLUCOSE P FAST SERPL-MCNC: 105 MG/DL (ref 65–99)
POTASSIUM SERPL-SCNC: 4.5 MMOL/L (ref 3.5–5.3)
PROT SERPL-MCNC: 6.6 G/DL (ref 6.4–8.4)
SODIUM SERPL-SCNC: 139 MMOL/L (ref 135–147)

## 2023-01-05 NOTE — TELEPHONE ENCOUNTER
Pt called stating she spoke to pharmacy regarding carafate and they stated they have not had carafate in over 5 months and do not know when they will restock  She is requesting something else be sent in and a call to her once this is done

## 2023-01-06 ENCOUNTER — HOSPITAL ENCOUNTER (OUTPATIENT)
Dept: CT IMAGING | Facility: HOSPITAL | Age: 68
Discharge: HOME | End: 2023-01-06
Attending: INTERNAL MEDICINE

## 2023-01-06 DIAGNOSIS — R10.13 EPIGASTRIC PAIN: ICD-10-CM

## 2023-01-06 RX ADMIN — IOHEXOL 90 ML: 350 INJECTION, SOLUTION INTRAVENOUS at 07:41

## 2023-01-17 DIAGNOSIS — R63.5 WEIGHT GAIN: ICD-10-CM

## 2023-01-17 RX ORDER — PHENTERMINE HYDROCHLORIDE 15 MG/1
15 CAPSULE ORAL EVERY MORNING
Qty: 30 CAPSULE | Refills: 0 | Status: SHIPPED | OUTPATIENT
Start: 2023-01-17

## 2023-01-23 ENCOUNTER — TELEPHONE (OUTPATIENT)
Dept: FAMILY MEDICINE CLINIC | Facility: CLINIC | Age: 68
End: 2023-01-23

## 2023-01-23 NOTE — TELEPHONE ENCOUNTER
Please speak to patient directly  CAT scan of abdomen showed some dilation of aorta    Radiologist recommends repeating CAT scan in 1 year for comparison

## 2023-01-23 NOTE — TELEPHONE ENCOUNTER
Spoke with pt, MD instructions given  Pt agrees with plan  Advised if she has any s/s of chest pain/SOB or any other medical change to contact our office ASAP for a follow up appt

## 2023-02-21 PROBLEM — Z12.11 COLON CANCER SCREENING: Status: RESOLVED | Noted: 2022-12-23 | Resolved: 2023-02-21

## 2023-02-24 DIAGNOSIS — R63.5 WEIGHT GAIN: ICD-10-CM

## 2023-02-24 RX ORDER — PHENTERMINE HYDROCHLORIDE 15 MG/1
15 CAPSULE ORAL EVERY MORNING
Qty: 30 CAPSULE | Refills: 0 | Status: SHIPPED | OUTPATIENT
Start: 2023-02-24

## 2023-03-09 NOTE — PROGRESS NOTES
Assessment/Plan:    pap with reflex done    mammogram reviewed with her including breast density  RX given and she will schedule  Discussed self breast exams    colon cancer screening-colonoscopy done in 2017    Discussed constipation, increase fiber and fluids, stool softener  Discussed not overusing laxatives  Rectocele/uterine prolapse-we discussed treatment of this to include pessary, surgery, Kegel's  Discussed urogynecology consult    DEXA is scheduled    discussed preventive care, regular exercise and a healthy diet      No problem-specific Assessment & Plan notes found for this encounter  Diagnoses and all orders for this visit:    Encounter for annual routine gynecological examination  -     Liquid-based pap, screening    Encounter for screening mammogram for breast cancer  -     Mammo screening bilateral w 3d & cad; Future    Asymptomatic menopause  -     DXA bone density spine hip and pelvis; Future          Subjective:      Patient ID: Cally Reilly is a 79 y o  female  New Pt  - 76year old female presents for yearly  She is a prior patient, last here in 2017  She has a rectocele and uterine prolapse that she feels is worse  They are minimally bothersome to her with the exception of she feels that she has chronic constipation  She uses laxatives very often  The rectocele does not interfere with her daily activities  Normal 3D mammogram in October 2021 with scattered fibroglandular densities and average risk  She has a mammogram and DEXA scheduled      The following portions of the patient's history were reviewed and updated as appropriate: allergies, current medications, past family history, past medical history, past social history, past surgical history and problem list     Review of Systems   Constitutional: Negative  Gastrointestinal: Negative  Genitourinary: Negative  Objective: There were no vitals taken for this visit           Physical Exam  Vitals reviewed  Constitutional:       Appearance: She is well-developed  Neck:      Thyroid: No thyromegaly  Trachea: No tracheal deviation  Cardiovascular:      Rate and Rhythm: Normal rate and regular rhythm  Pulmonary:      Effort: Pulmonary effort is normal       Breath sounds: Normal breath sounds  Chest:   Breasts:     Breasts are symmetrical       Right: No inverted nipple, mass, nipple discharge, skin change or tenderness  Left: No inverted nipple, mass, nipple discharge, skin change or tenderness  Abdominal:      General: There is no distension  Palpations: Abdomen is soft  There is no mass  Tenderness: There is no abdominal tenderness  Genitourinary:     Labia:         Right: No rash, tenderness, lesion or injury  Left: No rash, tenderness, lesion or injury  Vagina: Normal       Cervix: No cervical motion tenderness, discharge or friability  Adnexa:         Right: No mass, tenderness or fullness  Left: No mass, tenderness or fullness          Rectum: Normal       Comments: Moderate rectoele, min uterine prolapse

## 2023-03-10 ENCOUNTER — OFFICE VISIT (OUTPATIENT)
Dept: GYNECOLOGY | Facility: CLINIC | Age: 68
End: 2023-03-10

## 2023-03-10 DIAGNOSIS — Z12.31 ENCOUNTER FOR SCREENING MAMMOGRAM FOR BREAST CANCER: ICD-10-CM

## 2023-03-10 DIAGNOSIS — Z01.419 ENCOUNTER FOR ANNUAL ROUTINE GYNECOLOGICAL EXAMINATION: Primary | ICD-10-CM

## 2023-03-10 DIAGNOSIS — Z78.0 ASYMPTOMATIC MENOPAUSE: ICD-10-CM

## 2023-03-10 DIAGNOSIS — N81.6 RECTOCELE: ICD-10-CM

## 2023-03-17 ENCOUNTER — HOSPITAL ENCOUNTER (OUTPATIENT)
Dept: GASTROENTEROLOGY | Facility: AMBULARY SURGERY CENTER | Age: 68
Setting detail: OUTPATIENT SURGERY
End: 2023-03-17
Attending: INTERNAL MEDICINE

## 2023-03-17 ENCOUNTER — ANESTHESIA (OUTPATIENT)
Dept: GASTROENTEROLOGY | Facility: AMBULARY SURGERY CENTER | Age: 68
End: 2023-03-17

## 2023-03-17 ENCOUNTER — ANESTHESIA EVENT (OUTPATIENT)
Dept: GASTROENTEROLOGY | Facility: AMBULARY SURGERY CENTER | Age: 68
End: 2023-03-17

## 2023-03-17 VITALS
OXYGEN SATURATION: 98 % | DIASTOLIC BLOOD PRESSURE: 79 MMHG | BODY MASS INDEX: 27.82 KG/M2 | HEART RATE: 55 BPM | RESPIRATION RATE: 18 BRPM | SYSTOLIC BLOOD PRESSURE: 167 MMHG | WEIGHT: 157 LBS | HEIGHT: 63 IN | TEMPERATURE: 97.1 F

## 2023-03-17 DIAGNOSIS — R10.13 EPIGASTRIC PAIN: ICD-10-CM

## 2023-03-17 RX ORDER — PROPOFOL 10 MG/ML
INJECTION, EMULSION INTRAVENOUS AS NEEDED
Status: DISCONTINUED | OUTPATIENT
Start: 2023-03-17 | End: 2023-03-17

## 2023-03-17 RX ORDER — PROPOFOL 10 MG/ML
INJECTION, EMULSION INTRAVENOUS CONTINUOUS PRN
Status: DISCONTINUED | OUTPATIENT
Start: 2023-03-17 | End: 2023-03-17

## 2023-03-17 RX ORDER — SODIUM CHLORIDE, SODIUM LACTATE, POTASSIUM CHLORIDE, CALCIUM CHLORIDE 600; 310; 30; 20 MG/100ML; MG/100ML; MG/100ML; MG/100ML
INJECTION, SOLUTION INTRAVENOUS CONTINUOUS PRN
Status: DISCONTINUED | OUTPATIENT
Start: 2023-03-17 | End: 2023-03-17

## 2023-03-17 RX ORDER — LIDOCAINE HYDROCHLORIDE 10 MG/ML
INJECTION, SOLUTION EPIDURAL; INFILTRATION; INTRACAUDAL; PERINEURAL AS NEEDED
Status: DISCONTINUED | OUTPATIENT
Start: 2023-03-17 | End: 2023-03-17

## 2023-03-17 RX ADMIN — Medication 40 MG: at 10:18

## 2023-03-17 RX ADMIN — PROPOFOL 120 MCG/KG/MIN: 10 INJECTION, EMULSION INTRAVENOUS at 10:03

## 2023-03-17 RX ADMIN — PROPOFOL 70 MG: 10 INJECTION, EMULSION INTRAVENOUS at 10:04

## 2023-03-17 RX ADMIN — SODIUM CHLORIDE, SODIUM LACTATE, POTASSIUM CHLORIDE, AND CALCIUM CHLORIDE: .6; .31; .03; .02 INJECTION, SOLUTION INTRAVENOUS at 09:54

## 2023-03-17 RX ADMIN — LIDOCAINE HYDROCHLORIDE 40 MG: 10 INJECTION, SOLUTION EPIDURAL; INFILTRATION; INTRACAUDAL at 10:02

## 2023-03-17 NOTE — ANESTHESIA PREPROCEDURE EVALUATION
Procedure:  COLONOSCOPY  EGD    Relevant Problems   CARDIO   (+) Hyperlipidemia      ENDO   (+) Hypothyroidism   (+) Type 2 diabetes mellitus (HCC)        Physical Exam    Airway    Mallampati score: II  TM Distance: >3 FB  Neck ROM: full     Dental   No notable dental hx     Cardiovascular  Rhythm: regular, Rate: normal,     Pulmonary  Breath sounds clear to auscultation,     Other Findings  Intercisor Distance > 3cm          Anesthesia Plan  ASA Score- 2     Anesthesia Type- IV sedation with anesthesia with ASA Monitors  Additional Monitors:   Airway Plan:     Comment: NPO appropriate  Discussed benefits/risks of monitored anesthetic care which involves providing a dynamic level of mild to deep sedation  Complications include awareness and/or airway obstruction/aspiration which may necessitate conversion to general anesthesia  All questions answered  Patient understands and wishes to proceed          Plan Factors-Exercise tolerance (METS): >4 METS  Chart reviewed  EKG reviewed  Existing labs reviewed  Induction-     Postoperative Plan- Plan for postoperative opioid use  Informed Consent- Anesthetic plan and risks discussed with patient  I personally reviewed this patient with the CRNA  Discussed and agreed on the Anesthesia Plan with the CRNA  Cathi Mahmood

## 2023-03-17 NOTE — ANESTHESIA POSTPROCEDURE EVALUATION
Post-Op Assessment Note    CV Status:  Stable  Pain Score: 0    Pain management: adequate     Hydration Status:  Stable   PONV Controlled:  None   Airway Patency:  Patent      Post Op Vitals Reviewed: Yes      Staff: CRNA         No notable events documented      BP      Temp      Pulse     Resp      SpO2

## 2023-03-17 NOTE — H&P
History and Physical - [facility]  Princess Dang 76 y o  female MRN: [id number]    HPI: Princess Dang is a 76y o  year old female who presents for evaluation of left upper quadrant pain and colon cancer screening  Review of Systems    Historical Information   Past Medical History:   Diagnosis Date   • Diabetes mellitus ([address] )    • Disease of thyroid gland     hypo   • Hyperlipidemia    • Hypertension    • Insomnia    • Rectocele    • Screening for colon cancer    • Skin cancer      Past Surgical History:   Procedure Laterality Date   • CHOLECYSTECTOMY     • GASTRECTOMY  2001   • [place] COLONOSCOPY FLX DX W/COLLJ SPEC WHEN PFRMD N/A 10/31/2017    Procedure: COLONOSCOPY with bx and tatoo at 20cm, polypectomies x2;  Surgeon: Niya Alamo MD;  Location: [facility];   Service: General   • SKIN CANCER EXCISION      left neck areal; left upper arm; back     Social History   Social History     Substance and Sexual Activity   Alcohol Use Yes    Comment: social     Social History     Substance and Sexual Activity   Drug Use No     Social History     Tobacco Use   Smoking Status Never   Smokeless Tobacco Never     Family History   Problem Relation Age of Onset   • Dementia Mother    • Hyperlipidemia Father    • Heart attack Father    • Diabetes Father    • Heart disease Father    • Hypertension Father    • No Known Problems Sister    • No Known Problems Sister    • No Known Problems Sister    • No Known Problems Sister    • No Known Problems Daughter    • Melanoma Son    • No Known Problems Maternal Grandmother    • No Known Problems Maternal Grandfather    • No Known Problems Paternal Grandmother    • No Known Problems Paternal Grandfather    • No Known Problems Maternal Aunt    • No Known Problems Maternal Aunt    • No Known Problems Paternal Aunt        Meds/Allergies     (Not in a hospital admission)      No Known Allergies    Objective     There were no vitals taken for this visit       PHYSICAL EXAM    Gen: NAD  CV: RRR  CHEST: Clear  ABD: soft, NT/ND  EXT: no edema  Neuro: AAO      ASSESSMENT/PLAN:  This is a 76y o  year old female here for colon cancer screening and abdominal pain  PLAN:   Procedure: EGD and colonoscopy

## 2023-03-20 LAB
LAB AP GYN PRIMARY INTERPRETATION: NORMAL
Lab: NORMAL

## 2023-03-26 DIAGNOSIS — R10.13 EPIGASTRIC PAIN: ICD-10-CM

## 2023-03-26 DIAGNOSIS — E03.9 HYPOTHYROIDISM, UNSPECIFIED TYPE: ICD-10-CM

## 2023-03-26 DIAGNOSIS — R63.5 WEIGHT GAIN: ICD-10-CM

## 2023-03-26 RX ORDER — LEVOTHYROXINE SODIUM 0.15 MG/1
TABLET ORAL
Qty: 90 TABLET | Refills: 1 | Status: SHIPPED | OUTPATIENT
Start: 2023-03-26

## 2023-03-26 RX ORDER — PANTOPRAZOLE SODIUM 40 MG/1
TABLET, DELAYED RELEASE ORAL
Qty: 90 TABLET | Refills: 0 | Status: SHIPPED | OUTPATIENT
Start: 2023-03-26

## 2023-03-27 RX ORDER — PHENTERMINE HYDROCHLORIDE 15 MG/1
CAPSULE ORAL
Qty: 30 CAPSULE | Refills: 0 | Status: SHIPPED | OUTPATIENT
Start: 2023-03-27

## 2023-05-10 ENCOUNTER — HOSPITAL ENCOUNTER (OUTPATIENT)
Dept: BONE DENSITY | Facility: IMAGING CENTER | Age: 68
Discharge: HOME | End: 2023-05-10

## 2023-05-10 ENCOUNTER — HOSPITAL ENCOUNTER (OUTPATIENT)
Dept: MAMMOGRAPHY | Facility: IMAGING CENTER | Age: 68
Discharge: HOME | End: 2023-05-10

## 2023-05-10 VITALS — HEIGHT: 63 IN | BODY MASS INDEX: 28.39 KG/M2 | WEIGHT: 160.2 LBS

## 2023-05-10 VITALS — BODY MASS INDEX: 28.39 KG/M2 | HEIGHT: 63 IN | WEIGHT: 160.2 LBS

## 2023-05-10 DIAGNOSIS — Z78.0 ASYMPTOMATIC MENOPAUSE: ICD-10-CM

## 2023-05-10 DIAGNOSIS — Z12.31 ENCOUNTER FOR SCREENING MAMMOGRAM FOR BREAST CANCER: ICD-10-CM

## 2023-05-15 DIAGNOSIS — R63.5 WEIGHT GAIN: ICD-10-CM

## 2023-05-15 RX ORDER — PHENTERMINE HYDROCHLORIDE 15 MG/1
15 CAPSULE ORAL EVERY MORNING
Qty: 30 CAPSULE | Refills: 0 | Status: SHIPPED | OUTPATIENT
Start: 2023-05-15

## 2023-05-16 ENCOUNTER — VBI (OUTPATIENT)
Dept: ADMINISTRATIVE | Facility: OTHER | Age: 68
End: 2023-05-16

## 2023-05-18 ENCOUNTER — RA CDI HCC (OUTPATIENT)
Dept: OTHER | Facility: HOSPITAL | Age: 68
End: 2023-05-18

## 2023-05-18 NOTE — PROGRESS NOTES
[facility]  coding opportunities       Chart reviewed, no opportunity found: CHART REVIEWED, NO OPPORTUNITY FOUND        Patients Insurance     Medicare Insurance: [employer] [facility] Advantage

## 2023-05-26 ENCOUNTER — OFFICE VISIT (OUTPATIENT)
Dept: FAMILY MEDICINE CLINIC | Facility: CLINIC | Age: 68
End: 2023-05-26

## 2023-05-26 VITALS
BODY MASS INDEX: 28.88 KG/M2 | RESPIRATION RATE: 16 BRPM | HEIGHT: 63 IN | DIASTOLIC BLOOD PRESSURE: 80 MMHG | TEMPERATURE: 98 F | OXYGEN SATURATION: 93 % | WEIGHT: 163 LBS | SYSTOLIC BLOOD PRESSURE: 140 MMHG | HEART RATE: 66 BPM

## 2023-05-26 DIAGNOSIS — Z01.818 PREOPERATIVE EVALUATION OF A MEDICAL CONDITION TO RULE OUT SURGICAL CONTRAINDICATIONS (TAR REQUIRED): ICD-10-CM

## 2023-05-26 DIAGNOSIS — Z00.00 MEDICARE ANNUAL WELLNESS VISIT, SUBSEQUENT: Primary | ICD-10-CM

## 2023-05-26 DIAGNOSIS — E78.5 HYPERLIPIDEMIA, UNSPECIFIED HYPERLIPIDEMIA TYPE: ICD-10-CM

## 2023-05-26 DIAGNOSIS — E11.9 TYPE 2 DIABETES MELLITUS WITHOUT COMPLICATION, WITHOUT LONG-TERM CURRENT USE OF INSULIN (HCC): ICD-10-CM

## 2023-05-26 DIAGNOSIS — E03.9 HYPOTHYROIDISM, UNSPECIFIED TYPE: ICD-10-CM

## 2023-05-26 LAB
CREAT UR-MCNC: <13 MG/DL
MICROALBUMIN UR-MCNC: <5 MG/L (ref 0–20)
SL AMB POCT HEMOGLOBIN AIC: 6 (ref ?–6.5)

## 2023-05-26 PROCEDURE — 82043 UR ALBUMIN QUANTITATIVE: CPT | Performed by: FAMILY MEDICINE

## 2023-05-26 PROCEDURE — 82570 ASSAY OF URINE CREATININE: CPT | Performed by: FAMILY MEDICINE

## 2023-05-26 NOTE — ASSESSMENT & PLAN NOTE
Preoperative consultation  Overall the patient appears to be in stable health  She will obtain blood work as ordered for further evaluation    If blood work is within normal limits she will be medically cleared to have upcoming surgery as described

## 2023-05-26 NOTE — ASSESSMENT & PLAN NOTE
Hyperlipidemia  Patient will check lipid panel blood work    She has been reluctant to start any type of statin medication

## 2023-05-26 NOTE — ASSESSMENT & PLAN NOTE
Diabetes  A1c stable at 6 0  Patient will continue diet and exercise to control blood sugars    Her eye and foot exams are up-to-date  Lab Results   Component Value Date    HGBA1C 6 3 (H) 09/22/2022

## 2023-05-26 NOTE — PATIENT INSTRUCTIONS
Medicare Preventive Visit Patient Instructions  Thank you for completing your Welcome to Medicare Visit or Medicare Annual Wellness Visit today  Your next wellness visit will be due in one year (5/26/2024)  The screening/preventive services that you may require over the next 5-10 years are detailed below  Some tests may not apply to you based off risk factors and/or age  Screening tests ordered at today's visit but not completed yet may show as past due  Also, please note that scanned in results may not display below  Preventive Screenings:  Service Recommendations Previous Testing/Comments   Colorectal Cancer Screening  * Colonoscopy    * Fecal Occult Blood Test (FOBT)/Fecal Immunochemical Test (FIT)  * Fecal DNA/Cologuard Test  * Flexible Sigmoidoscopy Age: 39-70 years old   Colonoscopy: every 10 years (may be performed more frequently if at higher risk)  OR  FOBT/FIT: every 1 year  OR  Cologuard: every 3 years  OR  Sigmoidoscopy: every 5 years  Screening may be recommended earlier than age 39 if at higher risk for colorectal cancer  Also, an individualized decision between you and your healthcare provider will decide whether screening between the ages of 74-80 would be appropriate  Colonoscopy: 03/17/2023  FOBT/FIT: Not on file  Cologuard: Not on file  Sigmoidoscopy: Not on file    Screening Current     Breast Cancer Screening Age: 36 years old  Frequency: every 1-2 years  Not required if history of left and right mastectomy Mammogram: 05/10/2023    Screening Current   Cervical Cancer Screening Between the ages of 21-29, pap smear recommended once every 3 years  Between the ages of 33-67, can perform pap smear with HPV co-testing every 5 years     Recommendations may differ for women with a history of total hysterectomy, cervical cancer, or abnormal pap smears in past  Pap Smear: 03/10/2023    Screening Not Indicated   Hepatitis C Screening Once for adults born between 1945 and 1965  More frequently in patients at high risk for Hepatitis C Hep C Antibody: Not on file        Diabetes Screening 1-2 times per year if you're at risk for diabetes or have pre-diabetes Fasting glucose: 105 mg/dL (1/4/2023)  A1C: 6 3 % (9/22/2022)  Screening Not Indicated  History Diabetes   Cholesterol Screening Once every 5 years if you don't have a lipid disorder  May order more often based on risk factors  Lipid panel: 09/22/2022    Screening Not Indicated  History Lipid Disorder     Other Preventive Screenings Covered by Medicare:  1  Abdominal Aortic Aneurysm (AAA) Screening: covered once if your at risk  You're considered to be at risk if you have a family history of AAA  2  Lung Cancer Screening: covers low dose CT scan once per year if you meet all of the following conditions: (1) Age 50-69; (2) No signs or symptoms of lung cancer; (3) Current smoker or have quit smoking within the last 15 years; (4) You have a tobacco smoking history of at least 20 pack years (packs per day multiplied by number of years you smoked); (5) You get a written order from a healthcare provider  3  Glaucoma Screening: covered annually if you're considered high risk: (1) You have diabetes OR (2) Family history of glaucoma OR (3)  aged 48 and older OR (3)  American aged 72 and older  3  Osteoporosis Screening: covered every 2 years if you meet one of the following conditions: (1) You're estrogen deficient and at risk for osteoporosis based off medical history and other findings; (2) Have a vertebral abnormality; (3) On glucocorticoid therapy for more than 3 months; (4) Have primary hyperparathyroidism; (5) On osteoporosis medications and need to assess response to drug therapy  · Last bone density test (DXA Scan): 05/10/2023   5  HIV Screening: covered annually if you're between the age of 15-65  Also covered annually if you are younger than 13 and older than 72 with risk factors for HIV infection   For pregnant patients, it is covered up to 3 times per pregnancy  Immunizations:  Immunization Recommendations   Influenza Vaccine Annual influenza vaccination during flu season is recommended for all persons aged >= 6 months who do not have contraindications   Pneumococcal Vaccine   * Pneumococcal conjugate vaccine = PCV13 (Prevnar 13), PCV15 (Vaxneuvance), PCV20 (Prevnar 20)  * Pneumococcal polysaccharide vaccine = PPSV23 (Pneumovax) Adults 25-60 years old: 1-3 doses may be recommended based on certain risk factors  Adults 72 years old: 1-2 doses may be recommended based off what pneumonia vaccine you previously received   Hepatitis B Vaccine 3 dose series if at intermediate or high risk (ex: diabetes, end stage renal disease, liver disease)   Tetanus (Td) Vaccine - COST NOT COVERED BY MEDICARE PART B Following completion of primary series, a booster dose should be given every 10 years to maintain immunity against tetanus  Td may also be given as tetanus wound prophylaxis  Tdap Vaccine - COST NOT COVERED BY MEDICARE PART B Recommended at least once for all adults  For pregnant patients, recommended with each pregnancy  Shingles Vaccine (Shingrix) - COST NOT COVERED BY MEDICARE PART B  2 shot series recommended in those aged 48 and above     Health Maintenance Due:      Topic Date Due   • Hepatitis C Screening  Never done   • Breast Cancer Screening: Mammogram  05/10/2025   • Cervical Cancer Screening  03/10/2028   • Colorectal Cancer Screening  03/15/2028     Immunizations Due:      Topic Date Due   • Pneumococcal Vaccine: 65+ Years (1 - PCV) Never done   • COVID-19 Vaccine (6 - Holcomb Peter series) 05/27/2022     Advance Directives   What are advance directives? Advance directives are legal documents that state your wishes and plans for medical care  These plans are made ahead of time in case you lose your ability to make decisions for yourself   Advance directives can apply to any medical decision, such as the treatments you want, and if you want to donate organs  What are the types of advance directives? There are many types of advance directives, and each state has rules about how to use them  You may choose a combination of any of the following:  · Living will: This is a written record of the treatment you want  You can also choose which treatments you do not want, which to limit, and which to stop at a certain time  This includes surgery, medicine, IV fluid, and tube feedings  · Durable power of  for healthcare [facility]): This is a written record that states who you want to make healthcare choices for you when you are unable to make them for yourself  This person, called a proxy, is usually a family member or a friend  You may choose more than 1 proxy  · Do not resuscitate (DNR) order:  A DNR order is used in case your heart stops beating or you stop breathing  It is a request not to have certain forms of treatment, such as CPR  A DNR order may be included in other types of advance directives  · Medical directive: This covers the care that you want if you are in a coma, near death, or unable to make decisions for yourself  You can list the treatments you want for each condition  Treatment may include pain medicine, surgery, blood transfusions, dialysis, IV or tube feedings, and a ventilator (breathing machine)  · Values history: This document has questions about your views, beliefs, and how you feel and think about life  This information can help others choose the care that you would choose  Why are advance directives important? An advance directive helps you control your care  Although spoken wishes may be used, it is better to have your wishes written down  Spoken wishes can be misunderstood, or not followed  Treatments may be given even if you do not want them  An advance directive may make it easier for your family to make difficult choices about your care     Weight Management   Why it is important to manage your weight: Being overweight increases your risk of health conditions such as heart disease, high blood pressure, type 2 diabetes, and certain types of cancer  It can also increase your risk for osteoarthritis, sleep apnea, and other respiratory problems  Aim for a slow, steady weight loss  Even a small amount of weight loss can lower your risk of health problems  How to lose weight safely:  A safe and healthy way to lose weight is to eat fewer calories and get regular exercise  You can lose up about 1 pound a week by decreasing the number of calories you eat by 500 calories each day  Healthy meal plan for weight management:  A healthy meal plan includes a variety of foods, contains fewer calories, and helps you stay healthy  A healthy meal plan includes the following:  · Eat whole-grain foods more often  A healthy meal plan should contain fiber  Fiber is the part of grains, fruits, and vegetables that is not broken down by your body  Whole-grain foods are healthy and provide extra fiber in your diet  Some examples of whole-grain foods are whole-wheat breads and pastas, oatmeal, brown rice, and bulgur  · Eat a variety of vegetables every day  Include dark, leafy greens such as spinach, kale, jeanmarie greens, and mustard greens  Eat yellow and orange vegetables such as carrots, sweet potatoes, and winter squash  · Eat a variety of fruits every day  Choose fresh or canned fruit (canned in its own juice or light syrup) instead of juice  Fruit juice has very little or no fiber  · Eat low-fat dairy foods  Drink fat-free (skim) milk or 1% milk  Eat fat-free yogurt and low-fat cottage cheese  Try low-fat cheeses such as mozzarella and other reduced-fat cheeses  · Choose meat and other protein foods that are low in fat  Choose beans or other legumes such as split peas or lentils  Choose fish, skinless poultry (chicken or turkey), or lean cuts of red meat (beef or pork)   Before you cook meat or poultry, cut off any visible fat    · Use less fat and oil  Try baking foods instead of frying them  Add less fat, such as margarine, sour cream, regular salad dressing and mayonnaise to foods  Eat fewer high-fat foods  Some examples of high-fat foods include french fries, doughnuts, ice cream, and cakes  · Eat fewer sweets  Limit foods and drinks that are high in sugar  This includes candy, cookies, regular soda, and sweetened drinks  Exercise:  Exercise at least 30 minutes per day on most days of the week  Some examples of exercise include walking, biking, dancing, and swimming  You can also fit in more physical activity by taking the stairs instead of the elevator or parking farther away from stores  Ask your healthcare provider about the best exercise plan for you  © Copyright [employer] 2018 Information is for End User's use only and may not be sold, redistributed or otherwise used for commercial purposes   All illustrations and images included in CareNotes® are the copyrighted property of [employer]  or [address] [place] [address]

## 2023-05-26 NOTE — PROGRESS NOTES
Assessment and Plan:     Problem List Items Addressed This Visit        Endocrine    Hypothyroidism     Hypothyroidism  Patient will check TSH blood work and continue with current dose of levothyroxine         Relevant Orders    Lipid panel    TSH, 3rd generation    Type 2 diabetes mellitus ([address] )     Diabetes  A1c stable at 6 0  Patient will continue diet and exercise to control blood sugars  Her eye and foot exams are up-to-date  Lab Results   Component Value Date    HGBA1C 6 3 (H) 09/22/2022            Relevant Orders    Lipid panel    TSH, 3rd generation       Other    Hyperlipidemia     Hyperlipidemia  Patient will check lipid panel blood work  She has been reluctant to start any type of statin medication         Relevant Orders    Lipid panel    TSH, 3rd generation    Preoperative evaluation of a medical condition to rule out surgical contraindications (TAR required)     Preoperative consultation  Overall the patient appears to be in stable health  She will obtain blood work as ordered for further evaluation  If blood work is within normal limits she will be medically cleared to have upcoming surgery as described        Other Visit Diagnoses     Medicare annual wellness visit, subsequent    -  Primary           Preventive health issues were discussed with patient, and age appropriate screening tests were ordered as noted in patient's After Visit Summary  Personalized health advice and appropriate referrals for health education or preventive services given if needed, as noted in patient's After Visit Summary  History of Present Illness:     Patient presents for a Medicare Wellness Visit    Patient in the office for preoperative consultation  She is scheduled to have a rectocele surgery on June 5, 2023 to be performed by Dr Unger  Patient is scheduled to have blood work and EKG  She has a mild scratchy throat but no other symptoms such as sore throat, coughing, fevers    She has been trying to lose weight but finds it frustrating with fluctuating weights  She does not obtain a regular form of regular exercise outside the home  Patient is up-to-date with Pap smear, mammogram, and colonoscopy this year  Patient Care Team:  Valdemar Stubbs MD as PCP - Loraine Schilder, MD as PCP - [address] [place] [address] (RTE)  Vadim Nix, MD Olena Mansfield MD as Endoscopist  Tanisha Carpenter OD (Optometry)     Review of Systems:     Review of Systems   Constitutional: Negative  HENT: Negative  Eyes: Negative  Respiratory: Negative  Cardiovascular: Negative  Gastrointestinal:        As per HPI   Genitourinary: Negative  Musculoskeletal: Negative  Skin: Negative  Allergic/Immunologic: Negative  Neurological: Negative  Psychiatric/Behavioral: Negative  Problem List:     Patient Active Problem List   Diagnosis   • Hyperlipidemia   • Hypothyroidism   • Onychomycosis   • Rectocele   • Hypoglycemia   • Type 2 diabetes mellitus ([facility] [address] )   • Epigastric pain   • Insomnia   • Weight gain   • LUQ pain   • Preoperative evaluation of a medical condition to rule out surgical contraindications (TAR required)      Past Medical and Surgical History:     Past Medical History:   Diagnosis Date   • Colon polyp    • Diabetes mellitus ([address] )    • Disease of thyroid gland     hypo   • Hyperlipidemia    • Hypertension    • Insomnia    • Melanoma ([facility] [address] ) 2016    multiple   • Rectocele    • Screening for colon cancer    • Skin cancer      Past Surgical History:   Procedure Laterality Date   • CHOLECYSTECTOMY     • GASTRECTOMY  2001   • UT COLONOSCOPY FLX DX W/COLLJ SPEC WHEN PFRMD N/A 10/31/2017    Procedure: COLONOSCOPY with bx and tatoo at 20cm, polypectomies x2;  Surgeon: Olena Gardner MD;  Location: [facility];   Service: General   • SKIN CANCER EXCISION      left neck areal; left upper arm; back      Family History:     Family History   Problem Relation Age of Onset   • Dementia Mother    • Diabetes Mother    • Hyperlipidemia Father    • Heart attack Father    • Diabetes Father    • Heart disease Father    • Hypertension Father    • No Known Problems Sister    • No Known Problems Sister    • No Known Problems Sister    • No Known Problems Sister    • No Known Problems Daughter    • No Known Problems Maternal Grandmother    • No Known Problems Maternal Grandfather    • No Known Problems Paternal Grandmother    • No Known Problems Paternal Grandfather    • Melanoma Son 21   • No Known Problems Maternal Aunt    • No Known Problems Maternal Aunt    • No Known Problems Paternal Aunt       Social History:     Social History     Socioeconomic History   • Marital status: /Civil Union     Spouse name: None   • Number of children: 2   • Years of education: None   • Highest education level: None   Occupational History   • None   Tobacco Use   • Smoking status: Never   • Smokeless tobacco: Never   Vaping Use   • Vaping Use: Never used   Substance and Sexual Activity   • Alcohol use: Not Currently     Comment: social   • Drug use: No   • Sexual activity: Yes     Partners: Male     Birth control/protection: Post-menopausal   Other Topics Concern   • None   Social History Narrative    Caffeine use     Social Determinants of Health     Financial Resource Strain: Low Risk  (5/26/2023)    Overall Financial Resource Strain (CARDIA)    • Difficulty of Paying Living Expenses: Not hard at all   Food Insecurity: Not on file   Transportation Needs: No Transportation Needs (5/26/2023)    PRAPARE - Transportation    • Lack of Transportation (Medical): No    • Lack of Transportation (Non-Medical):  No   Physical Activity: Not on file   Stress: Not on file   Social Connections: Not on file   Intimate Partner Violence: Not on file   Housing Stability: Not on file      Medications and Allergies:     Current Outpatient Medications   Medication Sig Dispense Refill   • levothyroxine 150 mcg tablet take 1 tablet by mouth once daily 90 tablet 1   • Multiple Vitamins-Minerals (MULTIVITAMIN WOMEN 50+ PO) Take 1 caplet by mouth daily     • pantoprazole (PROTONIX) 40 mg tablet take 1 tablet by mouth once daily 90 tablet 0   • phentermine 15 MG capsule Take 1 capsule (15 mg total) by mouth every morning 30 capsule 0   • traZODone (DESYREL) 100 mg tablet 1 & 1/2 tab po q hs 135 tablet 1     No current facility-administered medications for this visit  No Known Allergies   Immunizations:     Immunization History   Administered Date(s) Administered   • COVID-19 PFIZER VACCINE 0 3 ML IM 03/24/2021, 04/15/2021, 10/26/2021   • COVID-19 Pfizer vac (Stefano-sucrose, gray cap) 12 yr+ IM 04/01/2022   • COVID-19, unspecified 04/01/2022   • INFLUENZA 10/05/2020, 10/26/2021   • Influenza Quadrivalent Preservative Free 3 years and older IM 11/24/2015, 09/25/2017   • Influenza, high dose seasonal 0 7 mL 09/26/2022   • Influenza, injectable, quadrivalent, preservative free 0 5 mL 10/15/2018, 09/23/2019   • Influenza, seasonal, injectable 01/07/2013, 12/30/2013      Health Maintenance:         Topic Date Due   • Hepatitis C Screening  Never done   • Breast Cancer Screening: Mammogram  05/10/2025   • Cervical Cancer Screening  03/10/2028   • Colorectal Cancer Screening  03/15/2028         Topic Date Due   • Pneumococcal Vaccine: 65+ Years (1 - PCV) Never done   • COVID-19 Vaccine (6 - Pfizer series) 05/27/2022      Medicare Screening Tests and Risk Assessments:     Naa Blackwell is here for her Subsequent Wellness visit  Last Medicare Wellness visit information reviewed, patient interviewed and updates made to the record today  Health Risk Assessment:   Patient rates overall health as very good  Patient feels that their physical health rating is same  Patient is satisfied with their life  Eyesight was rated as same  Hearing was rated as same  Patient feels that their emotional and mental health rating is same   Patients states they are never, rarely angry  Patient states they are never, rarely unusually tired/fatigued  Pain experienced in the last 7 days has been some  Patient's pain rating has been 4/10  Patient states that she has experienced no weight loss or gain in last 6 months  Depression Screening:   PHQ-2 Score: 0      Fall Risk Screening: In the past year, patient has experienced: no history of falling in past year      Urinary Incontinence Screening:   Patient has not leaked urine accidently in the last six months  Home Safety:  Patient does not have trouble with stairs inside or outside of their home  Patient has working smoke alarms and has working carbon monoxide detector  Home safety hazards include: uneven floors  Nutrition:   Current diet is Low Carb  Medications:   Patient is currently taking over-the-counter supplements  OTC medications include: Women's Multi  Vitamin  Patient is able to manage medications  Activities of Daily Living (ADLs)/Instrumental Activities of Daily Living (IADLs):   Walk and transfer into and out of bed and chair?: Yes  Dress and groom yourself?: Yes    Bathe or shower yourself?: Yes    Feed yourself? Yes  Do your laundry/housekeeping?: Yes  Manage your money, pay your bills and track your expenses?: Yes  Make your own meals?: Yes    Do your own shopping?: Yes    Previous Hospitalizations:   Any hospitalizations or ED visits within the last 12 months?: No      Advance Care Planning:   Living will: Yes    Durable POA for healthcare:  Yes    Advanced directive: Yes      PREVENTIVE SCREENINGS      Cardiovascular Screening:    General: History Lipid Disorder and Screening Current      Diabetes Screening:     General: History Diabetes and Screening Current      Colorectal Cancer Screening:     General: Screening Current      Breast Cancer Screening:     General: Screening Current      Cervical Cancer Screening:    General: Screening Not Indicated      Lung Cancer Screening:     General: Screening Not "Indicated    Screening, Brief Intervention, and Referral to Treatment (SBIRT)    Screening  Typical number of drinks in a day: 0  Typical number of drinks in a week: 0  Interpretation: Low risk drinking behavior  Single Item Drug Screening:  How often have you used an illegal drug (including marijuana) or a prescription medication for non-medical reasons in the past year? never    Single Item Drug Screen Score: 0  Interpretation: Negative screen for possible drug use disorder    No results found  Physical Exam:     /80 (BP Location: Right arm, Patient Position: Sitting, Cuff Size: Standard)   Pulse 66   Temp 98 °F (36 7 °C) (Temporal)   Resp 16   Ht 5' 2 5\" (1 588 m)   Wt 73 9 kg (163 lb)   SpO2 93%   BMI 29 34 kg/m²     Physical Exam  Vitals and nursing note reviewed  Constitutional:       General: She is not in acute distress  Appearance: Normal appearance  She is well-developed  She is not ill-appearing  HENT:      Head: Normocephalic and atraumatic  Eyes:      General:         Right eye: No discharge  Left eye: No discharge  Extraocular Movements: Extraocular movements intact  Conjunctiva/sclera: Conjunctivae normal       Pupils: Pupils are equal, round, and reactive to light  Neck:      Vascular: No carotid bruit  Cardiovascular:      Rate and Rhythm: Normal rate and regular rhythm  Heart sounds: No murmur heard  Pulmonary:      Effort: Pulmonary effort is normal  No respiratory distress  Breath sounds: Normal breath sounds  Abdominal:      General: Abdomen is flat  Bowel sounds are normal       Palpations: Abdomen is soft  Tenderness: There is no abdominal tenderness  There is no guarding or rebound  Musculoskeletal:      Right lower leg: No edema  Left lower leg: No edema  Lymphadenopathy:      Cervical: No cervical adenopathy  Skin:     General: Skin is warm and dry        Capillary Refill: Capillary refill takes less than 2 " seconds  Neurological:      Mental Status: She is alert  Mental status is at baseline  Psychiatric:         Mood and Affect: Mood normal          Behavior: Behavior normal          Thought Content: Thought content normal          Judgment: Judgment normal      EKG showed normal sinus rhythm at 66 bpm, horizontal axis, negative acute ischemic changes      Charline Bloch, MD

## 2023-05-27 ENCOUNTER — APPOINTMENT (OUTPATIENT)
Dept: LAB | Facility: CLINIC | Age: 68
End: 2023-05-27

## 2023-05-27 ENCOUNTER — TRANSCRIBE ORDERS (OUTPATIENT)
Dept: LAB | Facility: CLINIC | Age: 68
End: 2023-05-27

## 2023-05-27 DIAGNOSIS — R39.15 URGENCY OF URINATION: ICD-10-CM

## 2023-05-27 DIAGNOSIS — Z01.812 PRE-OPERATIVE LABORATORY EXAMINATION: Primary | ICD-10-CM

## 2023-05-27 DIAGNOSIS — Z01.812 PRE-OPERATIVE LABORATORY EXAMINATION: ICD-10-CM

## 2023-05-27 DIAGNOSIS — E11.9 TYPE 2 DIABETES MELLITUS WITHOUT COMPLICATION, WITHOUT LONG-TERM CURRENT USE OF INSULIN (HCC): ICD-10-CM

## 2023-05-27 DIAGNOSIS — E03.9 HYPOTHYROIDISM, UNSPECIFIED TYPE: ICD-10-CM

## 2023-05-27 DIAGNOSIS — E78.5 HYPERLIPIDEMIA, UNSPECIFIED HYPERLIPIDEMIA TYPE: ICD-10-CM

## 2023-05-27 LAB
ANION GAP SERPL CALCULATED.3IONS-SCNC: -1 MMOL/L (ref 4–13)
BASOPHILS # BLD AUTO: 0.04 THOUSANDS/ÂΜL (ref 0–0.1)
BASOPHILS NFR BLD AUTO: 1 % (ref 0–1)
BUN SERPL-MCNC: 12 MG/DL (ref 5–25)
CALCIUM SERPL-MCNC: 9 MG/DL (ref 8.3–10.1)
CHLORIDE SERPL-SCNC: 105 MMOL/L (ref 96–108)
CHOLEST SERPL-MCNC: 263 MG/DL
CO2 SERPL-SCNC: 32 MMOL/L (ref 21–32)
CREAT SERPL-MCNC: 1.05 MG/DL (ref 0.6–1.3)
EOSINOPHIL # BLD AUTO: 0.06 THOUSAND/ÂΜL (ref 0–0.61)
EOSINOPHIL NFR BLD AUTO: 1 % (ref 0–6)
ERYTHROCYTE [DISTWIDTH] IN BLOOD BY AUTOMATED COUNT: 14.5 % (ref 11.6–15.1)
EST. AVERAGE GLUCOSE BLD GHB EST-MCNC: 123 MG/DL
GFR SERPL CREATININE-BSD FRML MDRD: 54 ML/MIN/1.73SQ M
GLUCOSE P FAST SERPL-MCNC: 96 MG/DL (ref 65–99)
HBA1C MFR BLD: 5.9 %
HCT VFR BLD AUTO: 43.5 % (ref 34.8–46.1)
HDLC SERPL-MCNC: 94 MG/DL
HGB BLD-MCNC: 14 G/DL (ref 11.5–15.4)
IMM GRANULOCYTES # BLD AUTO: 0.03 THOUSAND/UL (ref 0–0.2)
IMM GRANULOCYTES NFR BLD AUTO: 1 % (ref 0–2)
LDLC SERPL CALC-MCNC: 146 MG/DL (ref 0–100)
LYMPHOCYTES # BLD AUTO: 1.38 THOUSANDS/ÂΜL (ref 0.6–4.47)
LYMPHOCYTES NFR BLD AUTO: 29 % (ref 14–44)
MCH RBC QN AUTO: 30.3 PG (ref 26.8–34.3)
MCHC RBC AUTO-ENTMCNC: 32.2 G/DL (ref 31.4–37.4)
MCV RBC AUTO: 94 FL (ref 82–98)
MONOCYTES # BLD AUTO: 0.51 THOUSAND/ÂΜL (ref 0.17–1.22)
MONOCYTES NFR BLD AUTO: 11 % (ref 4–12)
NEUTROPHILS # BLD AUTO: 2.79 THOUSANDS/ÂΜL (ref 1.85–7.62)
NEUTS SEG NFR BLD AUTO: 57 % (ref 43–75)
NONHDLC SERPL-MCNC: 169 MG/DL
NRBC BLD AUTO-RTO: 0 /100 WBCS
PLATELET # BLD AUTO: 247 THOUSANDS/UL (ref 149–390)
PMV BLD AUTO: 10.7 FL (ref 8.9–12.7)
POTASSIUM SERPL-SCNC: 4.3 MMOL/L (ref 3.5–5.3)
RBC # BLD AUTO: 4.62 MILLION/UL (ref 3.81–5.12)
SODIUM SERPL-SCNC: 136 MMOL/L (ref 135–147)
TRIGL SERPL-MCNC: 113 MG/DL
TSH SERPL DL<=0.05 MIU/L-ACNC: 3.47 UIU/ML (ref 0.45–4.5)
WBC # BLD AUTO: 4.81 THOUSAND/UL (ref 4.31–10.16)

## 2023-05-30 NOTE — PRE-PROCEDURE INSTRUCTIONS
Pre-Surgery Instructions:   Medication Instructions   • levothyroxine 150 mcg tablet Take day of surgery  • pantoprazole (PROTONIX) 40 mg tablet Take day of surgery  • traZODone (DESYREL) 100 mg tablet Take day of surgery  See above      Medication instructions for day surgery reviewed  Please use only a sip of water to take your instructed medications  Avoid all over the counter vitamins, supplements and NSAIDS for one week prior to surgery per anesthesia guidelines  Tylenol is ok to take as needed  You will receive a call one business day prior to surgery with an arrival time and hospital directions  If your surgery is scheduled on a Monday, the hospital will be calling you on the Friday prior to your surgery  If you have not heard from anyone by 8pm, please call the hospital supervisor through the hospital  at [phone]  [facility] [place] [phone])  Do not eat or drink anything after midnight the night before your surgery, including candy, mints, lifesavers, or chewing gum  Do not drink alcohol 24hrs before your surgery  Try not to smoke at least 24hrs before your surgery  Follow the pre surgery showering instructions as listed in the [facility] Surgical Experience Booklet” or otherwise provided by your surgeon's office  Do not shave the surgical area 24 hours before surgery  Do not apply any lotions, creams, including makeup, cologne, deodorant, or perfumes after showering on the day of your surgery  No contact lenses, eye make-up, or artificial eyelashes  Remove nail polish, including gel polish, and any artificial, gel, or acrylic nails if possible  Remove all jewelry including rings and body piercing jewelry  Wear causal clothing that is easy to take on and off  Consider your type of surgery  Keep any valuables, jewelry, piercings at home  Please bring any specially ordered equipment (sling, braces) if indicated      Arrange for a responsible person to drive you to and from the hospital on the day of your surgery  Visitor Guidelines discussed  Call the surgeon's office with any new illnesses, exposures, or additional questions prior to surgery  Please reference your [facility] Surgical Experience Booklet” for additional information to prepare for your upcoming surgery

## 2023-06-02 ENCOUNTER — ANESTHESIA EVENT (OUTPATIENT)
Dept: PERIOP | Facility: HOSPITAL | Age: 68
End: 2023-06-02
Payer: COMMERCIAL

## 2023-06-05 ENCOUNTER — ANESTHESIA (OUTPATIENT)
Dept: PERIOP | Facility: HOSPITAL | Age: 68
End: 2023-06-05
Payer: COMMERCIAL

## 2023-06-05 ENCOUNTER — HOSPITAL ENCOUNTER (OUTPATIENT)
Facility: HOSPITAL | Age: 68
Setting detail: OUTPATIENT SURGERY
Discharge: HOME | End: 2023-06-05
Attending: OBSTETRICS & GYNECOLOGY | Admitting: OBSTETRICS & GYNECOLOGY
Payer: COMMERCIAL

## 2023-06-05 VITALS
OXYGEN SATURATION: 96 % | TEMPERATURE: 97 F | HEART RATE: 68 BPM | DIASTOLIC BLOOD PRESSURE: 56 MMHG | RESPIRATION RATE: 16 BRPM | BODY MASS INDEX: 30.14 KG/M2 | SYSTOLIC BLOOD PRESSURE: 115 MMHG | WEIGHT: 163.8 LBS | HEIGHT: 62 IN

## 2023-06-05 DIAGNOSIS — N81.6 RECTOCELE: Primary | ICD-10-CM

## 2023-06-05 LAB
GLUCOSE SERPL-MCNC: 81 MG/DL (ref 65–140)
GLUCOSE SERPL-MCNC: 85 MG/DL (ref 65–140)

## 2023-06-05 PROCEDURE — 82948 REAGENT STRIP/BLOOD GLUCOSE: CPT

## 2023-06-05 RX ORDER — FENTANYL CITRATE 50 UG/ML
INJECTION, SOLUTION INTRAMUSCULAR; INTRAVENOUS AS NEEDED
Status: DISCONTINUED | OUTPATIENT
Start: 2023-06-05 | End: 2023-06-05

## 2023-06-05 RX ORDER — CEFAZOLIN SODIUM 2 G/50ML
2000 SOLUTION INTRAVENOUS ONCE
Status: COMPLETED | OUTPATIENT
Start: 2023-06-05 | End: 2023-06-05

## 2023-06-05 RX ORDER — KETOROLAC TROMETHAMINE 30 MG/ML
INJECTION, SOLUTION INTRAMUSCULAR; INTRAVENOUS AS NEEDED
Status: DISCONTINUED | OUTPATIENT
Start: 2023-06-05 | End: 2023-06-05

## 2023-06-05 RX ORDER — MIDAZOLAM HYDROCHLORIDE 2 MG/2ML
INJECTION, SOLUTION INTRAMUSCULAR; INTRAVENOUS AS NEEDED
Status: DISCONTINUED | OUTPATIENT
Start: 2023-06-05 | End: 2023-06-05

## 2023-06-05 RX ORDER — FENTANYL CITRATE/PF 50 MCG/ML
25 SYRINGE (ML) INJECTION
Status: DISCONTINUED | OUTPATIENT
Start: 2023-06-05 | End: 2023-06-05 | Stop reason: HOSPADM

## 2023-06-05 RX ORDER — ONDANSETRON 2 MG/ML
INJECTION INTRAMUSCULAR; INTRAVENOUS AS NEEDED
Status: DISCONTINUED | OUTPATIENT
Start: 2023-06-05 | End: 2023-06-05

## 2023-06-05 RX ORDER — DOCUSATE SODIUM 100 MG/1
100 CAPSULE, LIQUID FILLED ORAL 2 TIMES DAILY
Refills: 0
Start: 2023-06-05

## 2023-06-05 RX ORDER — ACETAMINOPHEN 325 MG/1
975 TABLET ORAL ONCE
Status: COMPLETED | OUTPATIENT
Start: 2023-06-05 | End: 2023-06-05

## 2023-06-05 RX ORDER — ONDANSETRON 2 MG/ML
4 INJECTION INTRAMUSCULAR; INTRAVENOUS ONCE AS NEEDED
Status: DISCONTINUED | OUTPATIENT
Start: 2023-06-05 | End: 2023-06-05 | Stop reason: HOSPADM

## 2023-06-05 RX ORDER — SODIUM CHLORIDE, SODIUM LACTATE, POTASSIUM CHLORIDE, CALCIUM CHLORIDE 600; 310; 30; 20 MG/100ML; MG/100ML; MG/100ML; MG/100ML
125 INJECTION, SOLUTION INTRAVENOUS CONTINUOUS
Status: DISCONTINUED | OUTPATIENT
Start: 2023-06-05 | End: 2023-06-05 | Stop reason: HOSPADM

## 2023-06-05 RX ORDER — ACETAMINOPHEN 325 MG/1
975 TABLET ORAL EVERY 6 HOURS PRN
Refills: 0
Start: 2023-06-05

## 2023-06-05 RX ORDER — ACETAMINOPHEN 325 MG/1
975 TABLET ORAL EVERY 6 HOURS PRN
Status: DISCONTINUED | OUTPATIENT
Start: 2023-06-05 | End: 2023-06-05 | Stop reason: HOSPADM

## 2023-06-05 RX ORDER — ONDANSETRON 2 MG/ML
4 INJECTION INTRAMUSCULAR; INTRAVENOUS EVERY 6 HOURS PRN
Status: DISCONTINUED | OUTPATIENT
Start: 2023-06-05 | End: 2023-06-05 | Stop reason: HOSPADM

## 2023-06-05 RX ORDER — OXYCODONE HYDROCHLORIDE 5 MG/1
5 TABLET ORAL EVERY 4 HOURS PRN
Status: DISCONTINUED | OUTPATIENT
Start: 2023-06-05 | End: 2023-06-05 | Stop reason: HOSPADM

## 2023-06-05 RX ORDER — PROPOFOL 10 MG/ML
INJECTION, EMULSION INTRAVENOUS AS NEEDED
Status: DISCONTINUED | OUTPATIENT
Start: 2023-06-05 | End: 2023-06-05

## 2023-06-05 RX ORDER — LIDOCAINE HYDROCHLORIDE 10 MG/ML
INJECTION, SOLUTION EPIDURAL; INFILTRATION; INTRACAUDAL; PERINEURAL AS NEEDED
Status: DISCONTINUED | OUTPATIENT
Start: 2023-06-05 | End: 2023-06-05

## 2023-06-05 RX ORDER — ALBUTEROL SULFATE 2.5 MG/3ML
2.5 SOLUTION RESPIRATORY (INHALATION) ONCE AS NEEDED
Status: DISCONTINUED | OUTPATIENT
Start: 2023-06-05 | End: 2023-06-05 | Stop reason: HOSPADM

## 2023-06-05 RX ORDER — DEXMEDETOMIDINE HYDROCHLORIDE 100 UG/ML
INJECTION, SOLUTION INTRAVENOUS AS NEEDED
Status: DISCONTINUED | OUTPATIENT
Start: 2023-06-05 | End: 2023-06-05

## 2023-06-05 RX ORDER — PROPOFOL 10 MG/ML
INJECTION, EMULSION INTRAVENOUS CONTINUOUS PRN
Status: DISCONTINUED | OUTPATIENT
Start: 2023-06-05 | End: 2023-06-05

## 2023-06-05 RX ORDER — GABAPENTIN 400 MG/1
400 CAPSULE ORAL ONCE
Status: COMPLETED | OUTPATIENT
Start: 2023-06-05 | End: 2023-06-05

## 2023-06-05 RX ORDER — PROMETHAZINE HYDROCHLORIDE 25 MG/ML
12.5 INJECTION, SOLUTION INTRAMUSCULAR; INTRAVENOUS ONCE AS NEEDED
Status: DISCONTINUED | OUTPATIENT
Start: 2023-06-05 | End: 2023-06-05 | Stop reason: HOSPADM

## 2023-06-05 RX ORDER — HYDROMORPHONE HCL/PF 1 MG/ML
0.5 SYRINGE (ML) INJECTION
Status: DISCONTINUED | OUTPATIENT
Start: 2023-06-05 | End: 2023-06-05 | Stop reason: HOSPADM

## 2023-06-05 RX ADMIN — LIDOCAINE HYDROCHLORIDE 100 MG: 10 INJECTION, SOLUTION EPIDURAL; INFILTRATION; INTRACAUDAL; PERINEURAL at 14:24

## 2023-06-05 RX ADMIN — PROPOFOL 50 MCG/KG/MIN: 10 INJECTION, EMULSION INTRAVENOUS at 14:24

## 2023-06-05 RX ADMIN — ONDANSETRON 4 MG: 2 INJECTION INTRAMUSCULAR; INTRAVENOUS at 14:23

## 2023-06-05 RX ADMIN — CEFAZOLIN SODIUM 2000 MG: 2 SOLUTION INTRAVENOUS at 14:24

## 2023-06-05 RX ADMIN — ACETAMINOPHEN 975 MG: 325 TABLET ORAL at 16:57

## 2023-06-05 RX ADMIN — FENTANYL CITRATE 25 MCG: 50 INJECTION INTRAMUSCULAR; INTRAVENOUS at 15:21

## 2023-06-05 RX ADMIN — ACETAMINOPHEN 975 MG: 325 TABLET ORAL at 11:08

## 2023-06-05 RX ADMIN — MIDAZOLAM 2 MG: 1 INJECTION INTRAMUSCULAR; INTRAVENOUS at 14:19

## 2023-06-05 RX ADMIN — FENTANYL CITRATE 25 MCG: 50 INJECTION INTRAMUSCULAR; INTRAVENOUS at 15:08

## 2023-06-05 RX ADMIN — DEXMEDETOMIDINE HCL 8 MCG: 100 INJECTION INTRAVENOUS at 14:32

## 2023-06-05 RX ADMIN — PROPOFOL 30 MG: 10 INJECTION, EMULSION INTRAVENOUS at 15:08

## 2023-06-05 RX ADMIN — SODIUM CHLORIDE, SODIUM LACTATE, POTASSIUM CHLORIDE, AND CALCIUM CHLORIDE 125 ML/HR: .6; .31; .03; .02 INJECTION, SOLUTION INTRAVENOUS at 11:15

## 2023-06-05 RX ADMIN — DEXMEDETOMIDINE HCL 8 MCG: 100 INJECTION INTRAVENOUS at 14:46

## 2023-06-05 RX ADMIN — FENTANYL CITRATE 25 MCG: 50 INJECTION INTRAMUSCULAR; INTRAVENOUS at 14:46

## 2023-06-05 RX ADMIN — DEXMEDETOMIDINE HCL 8 MCG: 100 INJECTION INTRAVENOUS at 14:28

## 2023-06-05 RX ADMIN — DEXMEDETOMIDINE HCL 8 MCG: 100 INJECTION INTRAVENOUS at 14:24

## 2023-06-05 RX ADMIN — DEXMEDETOMIDINE HCL 8 MCG: 100 INJECTION INTRAVENOUS at 15:41

## 2023-06-05 RX ADMIN — GABAPENTIN 400 MG: 400 CAPSULE ORAL at 11:08

## 2023-06-05 RX ADMIN — FENTANYL CITRATE 25 MCG: 50 INJECTION INTRAMUSCULAR; INTRAVENOUS at 15:40

## 2023-06-05 RX ADMIN — KETOROLAC TROMETHAMINE 15 MG: 30 INJECTION, SOLUTION INTRAMUSCULAR at 15:45

## 2023-06-05 RX ADMIN — SODIUM CHLORIDE, SODIUM LACTATE, POTASSIUM CHLORIDE, AND CALCIUM CHLORIDE: .6; .31; .03; .02 INJECTION, SOLUTION INTRAVENOUS at 15:48

## 2023-06-05 RX ADMIN — DEXMEDETOMIDINE HCL 8 MCG: 100 INJECTION INTRAVENOUS at 14:36

## 2023-06-05 NOTE — ANESTHESIA POSTPROCEDURE EVALUATION
Post-Op Assessment Note    CV Status:  Stable    Pain management: adequate     Mental Status:  Alert and awake   Hydration Status:  Euvolemic   PONV Controlled:  Controlled   Airway Patency:  Patent      Post Op Vitals Reviewed: Yes      Staff: Anesthesiologist         No notable events documented      /57 (06/05/23 1645)    Temp (!) 96 3 °F (35 7 °C) (06/05/23 1645)    Pulse (!) 51 (06/05/23 1645)   Resp 16 (06/05/23 1645)    SpO2 95 % (06/05/23 1645)

## 2023-06-05 NOTE — ANESTHESIA PREPROCEDURE EVALUATION
Procedure:  RECTOCELE REPAIR; PSB  ENTEROCELE REPAIR , PSB GRAFT (Perineum)    Relevant Problems   CARDIO   (+) Hyperlipidemia      ENDO   (+) Hypothyroidism   (+) Type 2 diabetes mellitus (HCC)        Physical Exam    Airway    Mallampati score: II  TM Distance: >3 FB  Neck ROM: full     Dental   No notable dental hx     Cardiovascular  Rhythm: regular, Rate: normal, Cardiovascular exam normal    Pulmonary  Pulmonary exam normal Breath sounds clear to auscultation,     Other Findings        Anesthesia Plan  ASA Score- 2     Anesthesia Type- IV sedation with anesthesia with ASA Monitors  Additional Monitors:   Airway Plan:     Comment: GA prn  Plan Factors-    Chart reviewed  Existing labs reviewed  Patient summary reviewed  Patient is not a current smoker  Patient not instructed to abstain from smoking on day of procedure  Patient did not smoke on day of surgery  There is medical exclusion for perioperative obstructive sleep apnea risk education  Induction- intravenous  Postoperative Plan-     Informed Consent- Anesthetic plan and risks discussed with patient and spouse

## 2023-06-05 NOTE — OP NOTE
OPERATIVE REPORT  PATIENT NAME: Yolanda Ramsay    :  1955  MRN: [id number]  Pt Location: [facility] OR ROOM 04    SURGERY DATE: 2023    Surgeon(s) and Role: * Junior Upton MD - Primary     * David Brooks MD - Fellow    Preop Diagnosis:  Rectocele [N81 6]  Vaginal enterocele [N81 5]  Incompetence or weakening of rectovaginal tissue [N81 83]    Post-Op Diagnosis Codes:     * Rectocele [N81 6]     * Vaginal enterocele [N81 5]     * Incompetence or weakening of rectovaginal tissue [N81 83]    Procedure(s):  RECTOCELE REPAIR    Estimated Blood Loss: Minimal    Drains:  [REMOVED] Urethral Catheter 25 Fr  (Removed)   Number of days: 0       Anesthesia Type:   IV Sedation with Anesthesia    Operative Indications:  Rectocele [N81 6]  Vaginal enterocele [N81 5]  Incompetence or weakening of rectovaginal tissue [N81 83]    Operative Findings:  - Large grade 3 rectocele   - End-stage lichen sclerosus with complete obliteration of normal architecture including clitoral pruitt, frenulum, labia minora     Complications:   None    Procedure and Technique:  Appropriate preoperative antibiotics chosen per ACOG guidelines were given  Bilateral SCDs were placed in the lower extremities for DVT prevention prior to the institution of anesthesia  The patient was identified in the holding area by the operating room staff and attending physician  She was taken to the operating room where anesthesia was instituted without complications  She was placed in the dorsal lithotomy position with the legs in [address] [place] [address] with care taken to avoid excessive flexion or extension of her lower extremities  The patient was prepped and draped in the usual sterile fashion  A Hassan catheter was inserted  Attention was then turned to the posterior compartment where 2 Allis clamps were placed in the posterior fourchette over the mucocutaneous border to reduce the markedly relaxed vaginal outlet  A fernando-shaped incision was made extending from the vaginal mucosa just to inside the perineum  The overlying skin was removed en bloc  With a concomitant digital rectal examination, the rectovaginal space was entered sharply and the incision was extended to the level of the vaginal cuff  We dissected the rectovaginal space down to the level of the rectal muscularis layer  With 2 Allis clamps at the distal and proximal edges of the rectocele vertically, a vertical incision was made using the Bovie scalpel over the Metzembaum scissors towards the distal stacey  We dissected the rectovaginal space down to the level of the rectal muscularis layer  The incision was extended distally until the most distal end of the defect was reached  Next we dissected the rectal mucosa free from the vaginal side wall with sharp and blunt dissection  We plicated the muscularis layer with a 2-0 PDS in a side-side fashion using interrupted suturing technique  A rectal was performed and no suture was felt within the rectal vault  Next excess vaginal mucosa was trimmed away from the incision and removed  We then began closure of the posterior colporrhaphy with an 0 Vicryl suture in a running locked stitch to the level of the hymen  A rectal was again performed and no suture was felt within the rectal vault  This completed the procedure and the incision was noted to be hemostatic  No bladder, ureteral, viscus, or solid organ injury were noted at the end of the procedure  The sponge, needle and instrument count were correct x 2  The patient tolerated the procedure well  She was awakened from anesthesia and transferred to the recovery room in stable condition  A qualified resident physician was not available  Dr Trisha Segundo was present for the entire procedure      Patient Disposition: PACU             SIGNATURE: Og Mishra MD  DATE: June 5, 2023  TIME: 4:26 PM

## 2023-06-05 NOTE — ANESTHESIA PREPROCEDURE EVALUATION
Procedure:  RECTOCELE REPAIR; PSB  ENTEROCELE REPAIR , PSB GRAFT (Perineum)    Relevant Problems   ANESTHESIA (within normal limits)      CARDIO   (+) Hyperlipidemia      ENDO   (+) Hypothyroidism   (+) Type 2 diabetes mellitus (HCC)      HEMATOLOGY (within normal limits)      PULMONARY (within normal limits)        Physical Exam    Airway    Mallampati score: II  TM Distance: >3 FB  Neck ROM: full     Dental   No notable dental hx     Cardiovascular  Cardiovascular exam normal    Pulmonary  Pulmonary exam normal     Other Findings        Anesthesia Plan  ASA Score- 2     Anesthesia Type- IV sedation with anesthesia with ASA Monitors  Additional Monitors:   Airway Plan:           Plan Factors-    Chart reviewed  Existing labs reviewed  Patient summary reviewed  Induction- intravenous  Postoperative Plan-     Informed Consent- Anesthetic plan and risks discussed with patient

## 2023-06-19 DIAGNOSIS — R10.13 EPIGASTRIC PAIN: ICD-10-CM

## 2023-06-19 RX ORDER — PANTOPRAZOLE SODIUM 40 MG/1
TABLET, DELAYED RELEASE ORAL
Qty: 90 TABLET | Refills: 0 | Status: SHIPPED | OUTPATIENT
Start: 2023-06-19

## 2023-06-20 DIAGNOSIS — R63.5 WEIGHT GAIN: ICD-10-CM

## 2023-06-20 RX ORDER — PHENTERMINE HYDROCHLORIDE 15 MG/1
15 CAPSULE ORAL EVERY MORNING
Qty: 30 CAPSULE | Refills: 0 | Status: SHIPPED | OUTPATIENT
Start: 2023-06-20

## 2023-07-05 ENCOUNTER — TELEPHONE (OUTPATIENT)
Dept: DIABETES SERVICES | Facility: CLINIC | Age: 68
End: 2023-07-05

## 2023-07-05 DIAGNOSIS — E11.9 TYPE 2 DIABETES MELLITUS WITHOUT COMPLICATION, WITHOUT LONG-TERM CURRENT USE OF INSULIN (HCC): Primary | ICD-10-CM

## 2023-07-05 NOTE — TELEPHONE ENCOUNTER
Jase Blackburn call our department ([facility] Diabetes Education) and said she would like to see an educator to help her with her diet. She said she discussed this with you about 6 months ago at an office visit. Can you put a referral in Epic for her?  Thank you

## 2023-07-24 ENCOUNTER — PATIENT MESSAGE (OUTPATIENT)
Dept: FAMILY MEDICINE CLINIC | Facility: CLINIC | Age: 68
End: 2023-07-24

## 2023-07-24 ENCOUNTER — OFFICE VISIT (OUTPATIENT)
Dept: DIABETES SERVICES | Facility: CLINIC | Age: 68
End: 2023-07-24
Payer: COMMERCIAL

## 2023-07-24 VITALS — BODY MASS INDEX: 30.73 KG/M2 | WEIGHT: 168 LBS

## 2023-07-24 DIAGNOSIS — E11.9 TYPE 2 DIABETES MELLITUS WITHOUT COMPLICATION, WITHOUT LONG-TERM CURRENT USE OF INSULIN (HCC): Primary | ICD-10-CM

## 2023-07-24 PROCEDURE — 97802 MEDICAL NUTRITION INDIV IN: CPT

## 2023-07-24 NOTE — PROGRESS NOTES
Medical Nutrition Therapy        Assessment    Visit Type: Initial visit  Chief complaint/Medical Diagnosis/reason for visit E11.9 J1AO without complication, without long-term current use of insulin. MENG Harrell was seen today for her initial MNT visit. Patient informed that she had a gastric bypass surgery in 2010 and had lost around 80 lbs. She has been on and off keto diet since then. Patient reports increase appetite recently and abdominal pain. She is currently not checking her BG levels but do feel that her BG levels are low sometimes and c/o of lightheadedness, sweats. Demonstrated and a glucometer kit was given to patient for home monitoring. BG levels checked in office today were 107mg/ dl. Problems identified in food recall include insufficient carbohydrate intake with meal skipping and diet poor in fiber intake. Provided patient with a 1300 calorie meal plan to assist with consistency, balance and portion control. Encouraged the consumption of regular meals at regular times. Advised patient to keep carbohydrate intake to 30-45 grams per meal and 15 grams per snack to assist with glycemic control. Suggested keeping protein intake to 5 ounces a day and fat to 3 servings daily to assist with lipid management and calorie control. Portion booklet and food labels were used to teach basic carbohydrate counting. Patient agreed to keep daily food logs and return them in one week for assessment. Patient will return in 6 weeks for a follow up MNT visit. RD will remain available for further dietary questions/concerns.      Ht Readings from Last 1 Encounters:   06/05/23 5' 2" (1.575 m)     Wt Readings from Last 3 Encounters:   07/24/23 76.2 kg (168 lb)   06/05/23 74.3 kg (163 lb 12.8 oz)   05/26/23 73.9 kg (163 lb)     Weight Change: No    Barriers to Learning: no barriers    Do you follow any special diet presently?: No  Who shops: patient  Who cooks: patient    Food Log: Completed via the method of food recall    Wake up: 6:30 am coffee, sweet and low  Breakfast usually skips / had 2 small pastries with nuts and raisins yesterday otherwise usually do not eat breakfast  Morning Snack:none   Lunch 11:30 am: 1 meatball sandwich (in a roll)  Afternoon Snack: none   Dinner 5:00-6:00 pm: 1 and 2/3 cup cooked spaghetti with meat sauce  Evening Snack: popcorn few handfuls / 1 keto bar  Beverages: crystallite iced tea mostly , water- not much   Eating out/Take out: at least once a week - mostly pizza or cheese steak without the bread  Exercise not beyond daily activities    Calorie needs 1300 kcals/day Carbs: 30-45 g/meal, 15 g HS snack     Fat: 3 servings/day    Protein:5 ounces/day    Nutrition Diagnosis:  Food and nutrition related knowledge deficit  related to Lack of prior exposure to accurate nutrition related information as evidenced by No prior knowledge of need for food and nutrition related recommendations    Intervention: plate method, increased fiber intake, label reading, behavior modification strategies, carbohydrate counting, meal timing, individualized meal plan and monitoring portion control     Treatment Goals: Patient understands education and recommendations, Patient will monitor fat intake, Patient will consume 3 meals a day, Patient will monitor portion control, Patient will consume 25-35 grams of fiber a day, Patient will count carbohydrates and Patient will monitor blood glucose    Monitoring and evaluation:    Term code indicator  FH 4.4 Mealtime Behavior Criteria: Consume 3 meals a day, 4-5 hours apart. Try not to skip any meals. Term code indicator  FH 1.6.3 Carbohydrate Intake Criteria: Aim for 30-45 grams of carbohydrates per meal and 15 grams of carbohydrates at post dinner snack. Term code indicator  FH 1.6.4 Fiber Intake Criteria: Include whole grains, non starchy vegetables and 2 serving of fruits in a day.   Term code indicator  FH 1.3.1 Fluid/Beverage Intake Criteria: Drink plenty of water.     Materials Provided: portion booklet, food record log, Accucheck guide me glucometer kit    Patient’s Response to Instruction:  Maria Luz Jauregui  Expected Compliancegood    Start- Stop: 9:20-10:45  Total Minutes: 85 Minutes  Group or Individual Instruction: MNT-I  Other: Chidi Rosario MD    Thank you for coming to the [address] [place] [address] for education today. Please feel free to call with any questions or concerns.     Rekha Esquivel  [address] [place] [phone]

## 2023-09-15 DIAGNOSIS — E03.9 HYPOTHYROIDISM, UNSPECIFIED TYPE: ICD-10-CM

## 2023-09-15 RX ORDER — LEVOTHYROXINE SODIUM 0.15 MG/1
TABLET ORAL
Qty: 90 TABLET | Refills: 1 | Status: SHIPPED | OUTPATIENT
Start: 2023-09-15

## 2023-09-20 DIAGNOSIS — G47.00 INSOMNIA, UNSPECIFIED TYPE: ICD-10-CM

## 2023-09-20 RX ORDER — TRAZODONE HYDROCHLORIDE 100 MG/1
TABLET ORAL
Qty: 135 TABLET | Refills: 1 | Status: SHIPPED | OUTPATIENT
Start: 2023-09-20

## 2023-10-16 ENCOUNTER — VBI (OUTPATIENT)
Dept: ADMINISTRATIVE | Facility: OTHER | Age: 68
End: 2023-10-16

## 2023-12-04 ENCOUNTER — TELEPHONE (OUTPATIENT)
Dept: FAMILY MEDICINE CLINIC | Facility: CLINIC | Age: 68
End: 2023-12-04

## 2023-12-04 DIAGNOSIS — G47.00 INSOMNIA, UNSPECIFIED TYPE: Primary | ICD-10-CM

## 2023-12-04 RX ORDER — TRAZODONE HYDROCHLORIDE 100 MG/1
TABLET ORAL
Qty: 60 TABLET | Refills: 5 | Status: SHIPPED | OUTPATIENT
Start: 2023-12-04

## 2023-12-04 NOTE — TELEPHONE ENCOUNTER
Patient called to request increase on trazodone. She is currently taking 1 and 1/2 tablets of 100mg and would like 1 tablet of 200mg. Did you want to see her first to discuss or is this something that you can prescribe? Please advise.

## 2023-12-07 ENCOUNTER — VBI (OUTPATIENT)
Dept: ADMINISTRATIVE | Facility: OTHER | Age: 68
End: 2023-12-07

## 2023-12-27 DIAGNOSIS — G47.00 INSOMNIA, UNSPECIFIED TYPE: ICD-10-CM

## 2023-12-27 RX ORDER — TRAZODONE HYDROCHLORIDE 100 MG/1
TABLET ORAL
Qty: 60 TABLET | Refills: 5 | Status: SHIPPED | OUTPATIENT
Start: 2023-12-27

## 2024-01-05 ENCOUNTER — VBI (OUTPATIENT)
Dept: ADMINISTRATIVE | Facility: OTHER | Age: 69
End: 2024-01-05

## 2024-02-19 LAB
LEFT EYE DIABETIC RETINOPATHY: NORMAL
RIGHT EYE DIABETIC RETINOPATHY: NORMAL

## 2024-03-04 DIAGNOSIS — G47.00 INSOMNIA, UNSPECIFIED TYPE: ICD-10-CM

## 2024-03-04 RX ORDER — TRAZODONE HYDROCHLORIDE 100 MG/1
200 TABLET ORAL
Qty: 180 TABLET | Refills: 0 | Status: SHIPPED | OUTPATIENT
Start: 2024-03-04

## 2024-03-11 DIAGNOSIS — E03.9 HYPOTHYROIDISM, UNSPECIFIED TYPE: ICD-10-CM

## 2024-03-11 RX ORDER — LEVOTHYROXINE SODIUM 0.15 MG/1
TABLET ORAL
Qty: 90 TABLET | Refills: 0 | Status: SHIPPED | OUTPATIENT
Start: 2024-03-11

## 2024-03-20 ENCOUNTER — OFFICE VISIT (OUTPATIENT)
Dept: URGENT CARE | Facility: CLINIC | Age: 69
End: 2024-03-20
Payer: COMMERCIAL

## 2024-03-20 ENCOUNTER — HOSPITAL ENCOUNTER (EMERGENCY)
Facility: HOSPITAL | Age: 69
Discharge: HOME | End: 2024-03-20
Attending: EMERGENCY MEDICINE
Payer: COMMERCIAL

## 2024-03-20 VITALS
DIASTOLIC BLOOD PRESSURE: 88 MMHG | TEMPERATURE: 97.1 F | HEART RATE: 68 BPM | RESPIRATION RATE: 18 BRPM | BODY MASS INDEX: 33.47 KG/M2 | WEIGHT: 183 LBS | OXYGEN SATURATION: 97 % | SYSTOLIC BLOOD PRESSURE: 150 MMHG

## 2024-03-20 VITALS
HEART RATE: 61 BPM | DIASTOLIC BLOOD PRESSURE: 87 MMHG | RESPIRATION RATE: 16 BRPM | TEMPERATURE: 97.4 F | OXYGEN SATURATION: 97 % | SYSTOLIC BLOOD PRESSURE: 168 MMHG

## 2024-03-20 DIAGNOSIS — K11.5 SUBMANDIBULAR SIALOLITHIASIS: Primary | ICD-10-CM

## 2024-03-20 DIAGNOSIS — R22.1 NECK SWELLING: Primary | ICD-10-CM

## 2024-03-20 PROCEDURE — 99284 EMERGENCY DEPT VISIT MOD MDM: CPT | Performed by: PHYSICIAN ASSISTANT

## 2024-03-20 PROCEDURE — 99203 OFFICE O/P NEW LOW 30 MIN: CPT

## 2024-03-20 PROCEDURE — 99283 EMERGENCY DEPT VISIT LOW MDM: CPT

## 2024-03-20 RX ORDER — AMOXICILLIN AND CLAVULANATE POTASSIUM 875; 125 MG/1; MG/1
1 TABLET, FILM COATED ORAL EVERY 12 HOURS
Qty: 14 TABLET | Refills: 0 | Status: SHIPPED | OUTPATIENT
Start: 2024-03-20 | End: 2024-03-27

## 2024-03-20 NOTE — DISCHARGE INSTRUCTIONS
Massage the gland, use sialogogues (lemon wedges, pickles - things to increase saliva production). Increase fluids - water. Follow up with ENT.  Take the Augmentin as directed. Return to the ED for worsening symptoms

## 2024-03-20 NOTE — ED PROVIDER NOTES
History  Chief Complaint   Patient presents with    Medical Problem     Pt presents with a lump on the right side of her neck. States it gets worse when eating and drinking. First noticed yesterday after eating a sandwich and noticed it got sylvester tender this am     Patient presents emergency department with a lump to the right side of her neck that gets larger when she eats symptoms started yesterday.  She went to urgent care this morning and they sent her here for further evaluation.  She is also notes a white lump underneath her tongue.  Is not having any fevers or chills.  She states that it is painful when it gets swollen.  No redness to the area.        Prior to Admission Medications   Prescriptions Last Dose Informant Patient Reported? Taking?   Multiple Vitamins-Minerals (MULTIVITAMIN WOMEN 50+ PO)  Self Yes No   Sig: Take 1 caplet by mouth daily   acetaminophen (TYLENOL) 325 mg tablet   No No   Sig: Take 3 tablets (975 mg total) by mouth every 6 (six) hours as needed for mild pain   docusate sodium (COLACE) 100 mg capsule   No No   Sig: Take 1 capsule (100 mg total) by mouth 2 (two) times a day   levothyroxine 150 mcg tablet   No No   Sig: take 1 tablet by mouth once daily   pantoprazole (PROTONIX) 40 mg tablet   No No   Sig: take 1 tablet by mouth once daily   phentermine 15 MG capsule   No No   Sig: Take 1 capsule (15 mg total) by mouth every morning   traZODone (DESYREL) 100 mg tablet   No No   Sig: TAKE 1 AND 1/2 TABLET BY MOUTH AT BEDTIME   traZODone (DESYREL) 100 mg tablet   No No   Sig: Take 2 tablets (200 mg total) by mouth daily at bedtime      Facility-Administered Medications: None       Past Medical History:   Diagnosis Date    Colon polyp     COVID-19     5/2022    Diabetes mellitus (HCC)     controlled with diet    Disease of thyroid gland     hypo    Hyperlipidemia     Hypertension     Insomnia     Melanoma (HCC) 2016    multiple    Rectocele     Screening for colon cancer     Skin cancer         Past Surgical History:   Procedure Laterality Date    CHOLECYSTECTOMY      COLONOSCOPY      GASTRECTOMY  2001    [place] COLONOSCOPY FLX DX W/COLLJ SPEC WHEN PFRMD N/A 10/31/2017    Procedure: COLONOSCOPY with bx and tatoo at 20cm, polypectomies x2;  Surgeon: Jerica Hester MD;  Location: [facility];  Service: General    [place] REPAIR RECTOCELE SEPARATE PROCEDURE N/A 6/5/2023    Procedure: RECTOCELE REPAIR;  Surgeon: Cisco Unger MD;  Location: [facility];  Service: UroGynecology           SKIN CANCER EXCISION      left neck areal; left upper arm; back       Family History   Problem Relation Age of Onset    Dementia Mother     Diabetes Mother     Hyperlipidemia Father     Heart attack Father     Diabetes Father     Heart disease Father     Hypertension Father     No Known Problems Sister     No Known Problems Sister     No Known Problems Sister     No Known Problems Sister     No Known Problems Daughter     No Known Problems Maternal Grandmother     No Known Problems Maternal Grandfather     No Known Problems Paternal Grandmother     No Known Problems Paternal Grandfather     Melanoma Son 20    No Known Problems Maternal Aunt     No Known Problems Maternal Aunt     No Known Problems Paternal Aunt      I have reviewed and agree with the history as documented.    E-Cigarette/Vaping    E-Cigarette Use Never User      E-Cigarette/Vaping Substances    Nicotine No     THC No     CBD No     Flavoring No     Other No     Unknown No      Social History     Tobacco Use    Smoking status: Never    Smokeless tobacco: Never   Vaping Use    Vaping status: Never Used   Substance Use Topics    Alcohol use: Not Currently     Comment: rarely    Drug use: No       Review of Systems   Constitutional:  Negative for chills and fever.   HENT: Negative.     Respiratory: Negative.     Cardiovascular: Negative.    Gastrointestinal: Negative.    Genitourinary: Negative.    All other systems reviewed and are negative.      Physical  Exam  Physical Exam  Vitals and nursing note reviewed.   Constitutional:       Appearance: She is well-developed.   HENT:      Head: Normocephalic and atraumatic.      Right Ear: External ear normal.      Left Ear: External ear normal.      Mouth/Throat:      Comments: To the right Maeve duct there is salivary stones starting to extrude from the duct.  Patient's right submandibular gland is mildly edematous there is no erythema.  No warmth to the area and no purulent drainage.  Eyes:      Conjunctiva/sclera: Conjunctivae normal.   Cardiovascular:      Rate and Rhythm: Normal rate and regular rhythm.      Heart sounds: Normal heart sounds.   Pulmonary:      Effort: Pulmonary effort is normal.      Breath sounds: Normal breath sounds.   Abdominal:      General: Bowel sounds are normal.      Palpations: Abdomen is soft.   Musculoskeletal:         General: Normal range of motion.      Cervical back: Neck supple.   Lymphadenopathy:      Cervical: No cervical adenopathy.   Skin:     General: Skin is warm.      Findings: No rash.   Neurological:      Mental Status: She is alert.   Psychiatric:         Behavior: Behavior normal.         Vital Signs  ED Triage Vitals   Temperature Pulse Respirations Blood Pressure SpO2   03/20/24 1118 03/20/24 1118 03/20/24 1118 03/20/24 1119 03/20/24 1118   (!) 97.4 °F (36.3 °C) 61 16 168/87 97 %      Temp Source Heart Rate Source Patient Position - Orthostatic VS BP Location FiO2 (%)   03/20/24 1118 03/20/24 1118 03/20/24 1118 03/20/24 1118 --   Oral Monitor Sitting Right arm       Pain Score       03/20/24 1118       2           Vitals:    03/20/24 1118 03/20/24 1119   BP:  168/87   Pulse: 61    Patient Position - Orthostatic VS: Sitting          Visual Acuity      ED Medications  Medications - No data to display    Diagnostic Studies  Results Reviewed       None                   No orders to display              Procedures  Procedures         ED Course                                              Medical Decision Making  Gland was massaged and used a forcep to remove one of the stones.  Unable to remove the remaining stones.  The gland continued to be massaged and trying to extrude the other stones but unable to do so.  Discussed treatment at home and ENT follow-up for potential duct dilation if it does not resolve on its own.  No redness or warmth no fevers or chills or systemic symptoms symptoms started yesterday and there is a clear stone causing the submandibular gland swelling we will hold off on any imaging or labs.  Discussed importance of ENT follow-up and reasons to return to the emergency department.    Amount and/or Complexity of Data Reviewed  External Data Reviewed: notes.    Risk  Prescription drug management.             Disposition  Final diagnoses:   Submandibular sialolithiasis     Time reflects when diagnosis was documented in both MDM as applicable and the Disposition within this note       Time User Action Codes Description Comment    3/20/2024 11:48 AM Livier Marte Add [K11.5] Submandibular sialolithiasis           ED Disposition       ED Disposition   Discharge    Condition   Stable    Date/Time   Wed Mar 20, 2024 11:48 AM    Comment   Radha Yu discharge to home/self care.                   Follow-up Information       Follow up With Specialties Details Why Contact Info    Chapin Rogers MD Family Medicine   [address] [place] [id number]  [phone]      Geovani Saravia MD Otolaryngology   [address] [place] [address].  [address]  [facility] [address]  [phone]              Discharge Medication List as of 3/20/2024 11:57 AM        START taking these medications    Details   amoxicillin-clavulanate (AUGMENTIN) 875-125 mg per tablet Take 1 tablet by mouth every 12 (twelve) hours for 7 days, Starting Wed 3/20/2024, Until Wed 3/27/2024, Normal           CONTINUE these medications which have NOT CHANGED    Details   acetaminophen  (TYLENOL) 325 mg tablet Take 3 tablets (975 mg total) by mouth every 6 (six) hours as needed for mild pain, Starting Mon 6/5/2023, No Print      docusate sodium (COLACE) 100 mg capsule Take 1 capsule (100 mg total) by mouth 2 (two) times a day, Starting Mon 6/5/2023, No Print      levothyroxine 150 mcg tablet take 1 tablet by mouth once daily, Normal      Multiple Vitamins-Minerals (MULTIVITAMIN WOMEN 50+ PO) Take 1 caplet by mouth daily, Historical Med      pantoprazole (PROTONIX) 40 mg tablet take 1 tablet by mouth once daily, Normal      phentermine 15 MG capsule Take 1 capsule (15 mg total) by mouth every morning, Starting Tue 6/20/2023, Normal      !! traZODone (DESYREL) 100 mg tablet TAKE 1 AND 1/2 TABLET BY MOUTH AT BEDTIME, Normal      !! traZODone (DESYREL) 100 mg tablet Take 2 tablets (200 mg total) by mouth daily at bedtime, Starting Mon 3/4/2024, Normal       !! - Potential duplicate medications found. Please discuss with provider.              PDMP Review       None            ED Provider  Electronically Signed by             Livier Marte PA-C  03/20/24 1942

## 2024-03-20 NOTE — PATIENT INSTRUCTIONS
You are you are to go to the ED for further evaluation of your symptoms.    Follow-up with your PCP after the ED.

## 2024-03-20 NOTE — PROGRESS NOTES
"  [facility]        NAME: Radha Yu is a 69 y.o. female  : 1955    MRN: [id number]  DATE: 2024  TIME: 11:03 AM    Assessment and Plan   Neck swelling [R22.1]  1. Neck swelling  Transfer to other facility            Patient Instructions       You are you are to go to the ED for further evaluation of your symptoms.    Follow-up with your PCP after the ED.      Chief Complaint     Chief Complaint   Patient presents with    Neck Pain     Patient is here with right side neck swelling that started yesterday after eating a sandwich. Patient denies eating anything she is allergic to. Denies SOB or trouble swallowing.         History of Present Illness       69-year-old female who presents for evaluation of neck swelling since yesterday afternoon.  Patient states she noticed that her neck became swollen and felt \"full\" while eating a ham and cheese sandwich.  Symptoms resolved within 30 minutes however returned the next time she ate that afternoon, which was a banana nut muffin.  Patient states she has eaten both of those foods before without issue.  No prior history of neck swelling.  Denies known food allergies.  No new medications. States drinking liquids does not reproduce symptoms, only eating solid foods.  Also feels swollen underneath her tongue however denies throat itchiness or swelling and difficulty breathing/speaking/eating/drinking. She denies headaches, dizziness/lightheadedness, blurred vision, cough, wheezing, SOB, CP/palpitations, and N/V/D.  No medications tried PTA.        Review of Systems   Review of Systems   Constitutional:  Negative for chills, diaphoresis, fatigue and fever.   HENT:  Positive for facial swelling (neck). Negative for congestion, drooling, ear pain, rhinorrhea, sinus pressure, sore throat, trouble swallowing and voice change.    Respiratory:  Negative for cough, choking, chest tightness, shortness of breath, wheezing and stridor.    Cardiovascular:  " Negative for chest pain and palpitations.   Gastrointestinal:  Negative for abdominal pain, diarrhea, nausea and vomiting.   Musculoskeletal:  Negative for arthralgias, joint swelling and myalgias.   Skin:  Negative for pallor and rash.   Neurological:  Negative for dizziness, weakness, light-headedness, numbness and headaches.         Current Medications       Current Outpatient Medications:     levothyroxine 150 mcg tablet, take 1 tablet by mouth once daily, Disp: 90 tablet, Rfl: 0    Multiple Vitamins-Minerals (MULTIVITAMIN WOMEN 50+ PO), Take 1 caplet by mouth daily, Disp: , Rfl:     traZODone (DESYREL) 100 mg tablet, Take 2 tablets (200 mg total) by mouth daily at bedtime, Disp: 180 tablet, Rfl: 0    acetaminophen (TYLENOL) 325 mg tablet, Take 3 tablets (975 mg total) by mouth every 6 (six) hours as needed for mild pain, Disp: , Rfl: 0    docusate sodium (COLACE) 100 mg capsule, Take 1 capsule (100 mg total) by mouth 2 (two) times a day, Disp: , Rfl: 0    pantoprazole (PROTONIX) 40 mg tablet, take 1 tablet by mouth once daily, Disp: 90 tablet, Rfl: 0    phentermine 15 MG capsule, Take 1 capsule (15 mg total) by mouth every morning, Disp: 30 capsule, Rfl: 0    traZODone (DESYREL) 100 mg tablet, TAKE 1 AND 1/2 TABLET BY MOUTH AT BEDTIME, Disp: 135 tablet, Rfl: 1    Current Allergies     Allergies as of 03/20/2024    (No Known Allergies)            The following portions of the patient's history were reviewed and updated as appropriate: allergies, current medications, past family history, past medical history, past social history, past surgical history and problem list.     Past Medical History:   Diagnosis Date    Colon polyp     COVID-19     5/2022    Diabetes mellitus (HCC)     controlled with diet    Disease of thyroid gland     hypo    Hyperlipidemia     Hypertension     Insomnia     Melanoma (HCC) 2016    multiple    Rectocele     Screening for colon cancer     Skin cancer        Past Surgical History:    Procedure Laterality Date    CHOLECYSTECTOMY      COLONOSCOPY      GASTRECTOMY  2001    VT COLONOSCOPY FLX DX W/COLLJ SPEC WHEN PFRMD N/A 10/31/2017    Procedure: COLONOSCOPY with bx and tatoo at 20cm, polypectomies x2;  Surgeon: Jerica Hester MD;  Location: [facility];  Service: General    VT REPAIR RECTOCELE SEPARATE PROCEDURE N/A 6/5/2023    Procedure: RECTOCELE REPAIR;  Surgeon: Cisco Unger MD;  Location: [facility];  Service: UroGynecology           SKIN CANCER EXCISION      left neck areal; left upper arm; back       Family History   Problem Relation Age of Onset    Dementia Mother     Diabetes Mother     Hyperlipidemia Father     Heart attack Father     Diabetes Father     Heart disease Father     Hypertension Father     No Known Problems Sister     No Known Problems Sister     No Known Problems Sister     No Known Problems Sister     No Known Problems Daughter     No Known Problems Maternal Grandmother     No Known Problems Maternal Grandfather     No Known Problems Paternal Grandmother     No Known Problems Paternal Grandfather     Melanoma Son 20    No Known Problems Maternal Aunt     No Known Problems Maternal Aunt     No Known Problems Paternal Aunt          Medications have been verified.        Objective   /88 (BP Location: Left arm, Patient Position: Sitting, Cuff Size: Large)   Pulse 68   Temp (!) 97.1 °F (36.2 °C) (Tympanic)   Resp 18   Wt 83 kg (183 lb)   SpO2 97%   BMI 33.47 kg/m²        Physical Exam     Physical Exam  Vitals and nursing note reviewed.   Constitutional:       General: She is not in acute distress.     Appearance: She is not ill-appearing.   HENT:      Head: Normocephalic and atraumatic.      Jaw: There is normal jaw occlusion. No tenderness or pain on movement.      Right Ear: External ear normal.      Left Ear: External ear normal.      Nose: Nose normal.      Mouth/Throat:      Mouth: Mucous membranes are moist.      Pharynx: Oropharynx is clear. Uvula  midline. No pharyngeal swelling.   Eyes:      Conjunctiva/sclera: Conjunctivae normal.      Pupils: Pupils are equal, round, and reactive to light.   Neck:     Cardiovascular:      Rate and Rhythm: Normal rate and regular rhythm.      Pulses: Normal pulses.      Heart sounds: Normal heart sounds.   Pulmonary:      Effort: Pulmonary effort is normal. No respiratory distress.      Breath sounds: Normal breath sounds. No stridor. No wheezing.   Musculoskeletal:         General: Normal range of motion.      Cervical back: Normal range of motion and neck supple. Edema (+tenderness) present. No pain with movement or spinous process tenderness.   Skin:     General: Skin is warm and dry.      Capillary Refill: Capillary refill takes less than 2 seconds.   Neurological:      Mental Status: She is alert and oriented to person, place, and time.

## 2024-03-21 DIAGNOSIS — G47.00 INSOMNIA, UNSPECIFIED TYPE: ICD-10-CM

## 2024-03-21 RX ORDER — TRAZODONE HYDROCHLORIDE 100 MG/1
TABLET ORAL
Qty: 135 TABLET | Refills: 1 | Status: SHIPPED | OUTPATIENT
Start: 2024-03-21

## 2024-03-26 ENCOUNTER — RA CDI HCC (OUTPATIENT)
Dept: OTHER | Facility: HOSPITAL | Age: 69
End: 2024-03-26

## 2024-04-02 ENCOUNTER — OFFICE VISIT (OUTPATIENT)
Dept: FAMILY MEDICINE CLINIC | Facility: CLINIC | Age: 69
End: 2024-04-02
Payer: COMMERCIAL

## 2024-04-02 VITALS
DIASTOLIC BLOOD PRESSURE: 84 MMHG | WEIGHT: 185 LBS | HEIGHT: 62 IN | RESPIRATION RATE: 16 BRPM | BODY MASS INDEX: 34.04 KG/M2 | TEMPERATURE: 97.6 F | SYSTOLIC BLOOD PRESSURE: 128 MMHG | HEART RATE: 70 BPM | OXYGEN SATURATION: 98 %

## 2024-04-02 DIAGNOSIS — E11.9 TYPE 2 DIABETES MELLITUS WITHOUT COMPLICATION, WITHOUT LONG-TERM CURRENT USE OF INSULIN (HCC): Primary | ICD-10-CM

## 2024-04-02 DIAGNOSIS — E03.9 HYPOTHYROIDISM, UNSPECIFIED TYPE: ICD-10-CM

## 2024-04-02 DIAGNOSIS — E78.5 HYPERLIPIDEMIA, UNSPECIFIED HYPERLIPIDEMIA TYPE: ICD-10-CM

## 2024-04-02 DIAGNOSIS — E66.9 CLASS 1 OBESITY WITH SERIOUS COMORBIDITY AND BODY MASS INDEX (BMI) OF 32.0 TO 32.9 IN ADULT, UNSPECIFIED OBESITY TYPE: ICD-10-CM

## 2024-04-02 LAB
CREAT UR-MCNC: 134.4 MG/DL
MICROALBUMIN UR-MCNC: 18.4 MG/L
MICROALBUMIN/CREAT 24H UR: 14 MG/G CREATININE (ref 0–30)
SL AMB POCT HEMOGLOBIN AIC: 6.7 (ref ?–6.5)

## 2024-04-02 PROCEDURE — 83036 HEMOGLOBIN GLYCOSYLATED A1C: CPT | Performed by: FAMILY MEDICINE

## 2024-04-02 PROCEDURE — 82570 ASSAY OF URINE CREATININE: CPT | Performed by: FAMILY MEDICINE

## 2024-04-02 PROCEDURE — 82043 UR ALBUMIN QUANTITATIVE: CPT | Performed by: FAMILY MEDICINE

## 2024-04-02 PROCEDURE — 99214 OFFICE O/P EST MOD 30 MIN: CPT | Performed by: FAMILY MEDICINE

## 2024-04-02 NOTE — PROGRESS NOTES
FAMILY PRACTICE OFFICE VISIT       NAME: Radha Yu  AGE: 69 y.o. SEX: female       : 1955        MRN: [id number]    DATE: 2024  TIME: 5:01 PM    Assessment and Plan     Problem List Items Addressed This Visit       Hyperlipidemia     Hyperlipidemia.  Patient will check lipid panel blood work.  We will make further recommendations pending results of test.         Relevant Orders    CBC    Comprehensive metabolic panel    Lipid panel    TSH, 3rd generation    Hypothyroidism     Hypothyroidism.  Patient will check TSH blood work and continue with current dose of levothyroxine         Relevant Orders    CBC    Comprehensive metabolic panel    Lipid panel    TSH, 3rd generation    Type 2 diabetes mellitus (HCC) - Primary     Diabetes.  I had a long discussion with the patient regarding treatment options.  She will initiate Ozempic 0.5 mg once weekly and call within 4 weeks to titrate medication.  Her foot exam is up-to-date  Lab Results   Component Value Date    HGBA1C 6.7 (A) 2024            Relevant Medications    semaglutide, 0.25 or 0.5 mg/dose, (Ozempic, 0.25 or 0.5 MG/DOSE,) 2 mg/3 mL injection pen    Other Relevant Orders    POCT hemoglobin A1c (Completed)    Albumin / creatinine urine ratio    CBC    Comprehensive metabolic panel    Lipid panel    TSH, 3rd generation     Other Visit Diagnoses       Class 1 obesity with serious comorbidity and body mass index (BMI) of 32.0 to 32.9 in adult, unspecified obesity type        Relevant Medications    semaglutide, 0.25 or 0.5 mg/dose, (Ozempic, 0.25 or 0.5 MG/DOSE,) 2 mg/3 mL injection pen                Chief Complaint     Chief Complaint   Patient presents with    Follow-up     DM  Shoes and socks removed    Weight Loss       History of Present Illness     Patient in the office to discuss her weight issues.  Over the years patient has been able to lose weight with various diets such as keto diet, weight watchers etc. unfortunately patient has  to constantly think of her food choices and measuring certain foods which she feels becomes very burdensome.  Notoriously she will fall off the wagon and regain some of the weight that she lost.  Since last office visit she has gained approximately 20 pounds.  Patient's maximum weight was approximately 240 at 1 point in her life.  A1c has increased from 5.9 previously to currently at 6.7.  Patient would like to consider medication such as Ozempic to help with both her weight and diabetes        Review of Systems   Review of Systems   Constitutional:  Positive for unexpected weight change.   HENT: Negative.     Eyes: Negative.    Respiratory: Negative.     Cardiovascular: Negative.    Gastrointestinal: Negative.    Genitourinary: Negative.    Musculoskeletal: Negative.    Neurological: Negative.    Psychiatric/Behavioral: Negative.         Active Problem List     Patient Active Problem List   Diagnosis    Hyperlipidemia    Hypothyroidism    Onychomycosis    Rectocele    Hypoglycemia    Type 2 diabetes mellitus (HCC)    Epigastric pain    Insomnia    Weight gain    LUQ pain    Preoperative evaluation of a medical condition to rule out surgical contraindications (TAR required)    Melanoma (HCC)       Past Medical History:  Past Medical History:   Diagnosis Date    Colon polyp     COVID-19     5/2022    Diabetes mellitus (HCC)     controlled with diet    Disease of thyroid gland     hypo    Hyperlipidemia     Hypertension     Insomnia     Melanoma (HCC) 2016    multiple    Rectocele     Screening for colon cancer     Skin cancer        Past Surgical History:  Past Surgical History:   Procedure Laterality Date    CHOLECYSTECTOMY      COLONOSCOPY      GASTRECTOMY  2001    CA COLONOSCOPY FLX DX W/COLLJ SPEC WHEN PFRMD N/A 10/31/2017    Procedure: COLONOSCOPY with bx and tatoo at 20cm, polypectomies x2;  Surgeon: Jerica Hester MD;  Location: [facility];  Service: General    CA REPAIR RECTOCELE SEPARATE PROCEDURE N/A  6/5/2023    Procedure: RECTOCELE REPAIR;  Surgeon: Cisco Unger MD;  Location: [facility];  Service: UroGynecology           SKIN CANCER EXCISION      left neck areal; left upper arm; back       Family History:  Family History   Problem Relation Age of Onset    Dementia Mother     Diabetes Mother     Hyperlipidemia Father     Heart attack Father     Diabetes Father     Heart disease Father     Hypertension Father     No Known Problems Sister     No Known Problems Sister     No Known Problems Sister     No Known Problems Sister     No Known Problems Daughter     No Known Problems Maternal Grandmother     No Known Problems Maternal Grandfather     No Known Problems Paternal Grandmother     No Known Problems Paternal Grandfather     Melanoma Son 20    No Known Problems Maternal Aunt     No Known Problems Maternal Aunt     No Known Problems Paternal Aunt        Social History:  Social History     Socioeconomic History    Marital status: /Civil Union     Spouse name: Not on file    Number of children: 2    Years of education: Not on file    Highest education level: Not on file   Occupational History    Not on file   Tobacco Use    Smoking status: Never    Smokeless tobacco: Never   Vaping Use    Vaping status: Never Used   Substance and Sexual Activity    Alcohol use: Not Currently     Comment: rarely    Drug use: No    Sexual activity: Yes     Partners: Male     Birth control/protection: Post-menopausal   Other Topics Concern    Not on file   Social History Narrative    Caffeine use     Social Determinants of Health     Financial Resource Strain: Low Risk  (5/26/2023)    Overall Financial Resource Strain (CARDIA)     Difficulty of Paying Living Expenses: Not hard at all   Food Insecurity: Not on file   Transportation Needs: No Transportation Needs (5/26/2023)    PRAPARE - Transportation     Lack of Transportation (Medical): No     Lack of Transportation (Non-Medical): No   Physical Activity: Not on file    Stress: Not on file   Social Connections: Not on file   Intimate Partner Violence: Not on file   Housing Stability: Not on file       Objective     Vitals:    04/02/24 1453   BP: 128/84   Pulse: 70   Resp: 16   Temp: 97.6 °F (36.4 °C)   SpO2: 98%     Wt Readings from Last 3 Encounters:   04/02/24 83.9 kg (185 lb)   03/20/24 83 kg (183 lb)   07/24/23 76.2 kg (168 lb)       Physical Exam  Constitutional:       General: She is not in acute distress.     Appearance: Normal appearance. She is not ill-appearing.   HENT:      Head: Normocephalic and atraumatic.   Eyes:      General:         Right eye: No discharge.         Left eye: No discharge.      Extraocular Movements: Extraocular movements intact.      Conjunctiva/sclera: Conjunctivae normal.      Pupils: Pupils are equal, round, and reactive to light.   Neck:      Vascular: No carotid bruit.   Cardiovascular:      Rate and Rhythm: Normal rate and regular rhythm.      Pulses: no weak pulses.           Dorsalis pedis pulses are 2+ on the right side and 2+ on the left side.        Posterior tibial pulses are 2+ on the right side and 2+ on the left side.      Heart sounds: Normal heart sounds. No murmur heard.  Pulmonary:      Effort: Pulmonary effort is normal.      Breath sounds: Normal breath sounds. No wheezing, rhonchi or rales.   Abdominal:      General: Abdomen is flat. Bowel sounds are normal. There is no distension.      Palpations: Abdomen is soft.      Tenderness: There is no abdominal tenderness. There is no guarding or rebound.   Musculoskeletal:      Right lower leg: No edema.      Left lower leg: No edema.   Feet:      Right foot:      Skin integrity: No ulcer, skin breakdown, erythema, warmth, callus or dry skin.      Left foot:      Skin integrity: No ulcer, skin breakdown, erythema, warmth, callus or dry skin.   Lymphadenopathy:      Cervical: No cervical adenopathy.   Skin:     Findings: No rash.   Neurological:      General: No focal deficit  present.      Mental Status: She is alert and oriented to person, place, and time.      Cranial Nerves: No cranial nerve deficit.   Psychiatric:         Mood and Affect: Mood normal.         Behavior: Behavior normal.         Thought Content: Thought content normal.         Judgment: Judgment normal.     Patient's shoes and socks removed.    Right Foot/Ankle   Right Foot Inspection  Skin Exam: skin normal and skin intact. No dry skin, no warmth, no callus, no erythema, no maceration, no abnormal color, no pre-ulcer, no ulcer and no callus.     Toe Exam: ROM and strength within normal limits.     Sensory   Vibration: intact  Monofilament testing: intact    Vascular  The right DP pulse is 2+. The right PT pulse is 2+.     Left Foot/Ankle  Left Foot Inspection  Skin Exam: skin normal and skin intact. No dry skin, no warmth, no erythema, no maceration, normal color, no pre-ulcer, no ulcer and no callus.     Toe Exam: ROM and strength within normal limits.     Sensory   Vibration: intact  Monofilament testing: intact    Vascular  The left DP pulse is 2+. The left PT pulse is 2+.     Assign Risk Category  No deformity present  No loss of protective sensation  No weak pulses  Risk: 0        Pertinent Laboratory/Diagnostic Studies:  Lab Results   Component Value Date    BUN 12 05/27/2023    CREATININE 1.05 05/27/2023    CALCIUM 9.0 05/27/2023     06/15/2016    K 4.3 05/27/2023    CO2 32 05/27/2023     05/27/2023     Lab Results   Component Value Date    ALT 46 01/04/2023    AST 23 01/04/2023    ALKPHOS 110 01/04/2023    BILITOT 0.5 06/15/2016       Lab Results   Component Value Date    WBC 4.81 05/27/2023    HGB 14.0 05/27/2023    HCT 43.5 05/27/2023    MCV 94 05/27/2023     05/27/2023       Lab Results   Component Value Date    TSH 7.50 (H) 03/12/2020       Lab Results   Component Value Date    CHOL 177 06/15/2016     Lab Results   Component Value Date    TRIG 113 05/27/2023     Lab Results   Component  Value Date    HDL 94 05/27/2023     Lab Results   Component Value Date    LDLCALC 146 (H) 05/27/2023     Lab Results   Component Value Date    HGBA1C 6.7 (A) 04/02/2024       Results for orders placed or performed in visit on 04/02/24   POCT hemoglobin A1c   Result Value Ref Range    Hemoglobin A1C 6.7 (A) 6.5       Orders Placed This Encounter   Procedures    Albumin / creatinine urine ratio    CBC    Comprehensive metabolic panel    Lipid panel    TSH, 3rd generation    POCT hemoglobin A1c       ALLERGIES:  No Known Allergies    Current Medications     Current Outpatient Medications   Medication Sig Dispense Refill    levothyroxine 150 mcg tablet take 1 tablet by mouth once daily 90 tablet 0    Multiple Vitamins-Minerals (MULTIVITAMIN WOMEN 50+ PO) Take 1 caplet by mouth daily      semaglutide, 0.25 or 0.5 mg/dose, (Ozempic, 0.25 or 0.5 MG/DOSE,) 2 mg/3 mL injection pen Inject 0.75 mL (0.5 mg total) under the skin every 7 days 3 mL 0    traZODone (DESYREL) 100 mg tablet Take 2 tablets (200 mg total) by mouth daily at bedtime 180 tablet 0     No current facility-administered medications for this visit.         Health Maintenance     Health Maintenance   Topic Date Due    Hepatitis C Screening  Never done    Pneumococcal Vaccine: 65+ Years (1 of 2 - PCV) Never done    Zoster Vaccine (1 of 2) Never done    Influenza Vaccine (1) 09/01/2023    Diabetic Foot Exam  09/26/2023    COVID-19 Vaccine (7 - 2023-24 season) 11/30/2023    Fall Risk  05/26/2024    Medicare Annual Wellness Visit (AWV)  05/26/2024    Kidney Health Evaluation: Albumin/Creatinine Ratio  05/26/2024    Kidney Health Evaluation: GFR  05/27/2024    Urinary Incontinence Screening  05/26/2024    HEMOGLOBIN A1C  10/02/2024    Depression Screening  04/02/2025    Breast Cancer Screening: Mammogram  05/10/2025    DM Eye Exam  02/19/2026    Cervical Cancer Screening  03/10/2028    Colorectal Cancer Screening  03/15/2028    Osteoporosis Screening  Completed     HIB Vaccine  Aged Out    IPV Vaccine  Aged Out    Hepatitis A Vaccine  Aged Out    Meningococcal ACWY Vaccine  Aged Out    HPV Vaccine  Aged Out     Immunization History   Administered Date(s) Administered    COVID-19 PFIZER VACCINE 0.3 ML IM 03/24/2021, 04/15/2021, 10/26/2021    COVID-19 Pfizer mRNA vacc PF stefano-sucrose 12 yr and older (Comirnaty) 10/05/2023    COVID-19 Pfizer vac (Stefano-sucrose, gray cap) 12 yr+ IM 04/01/2022    COVID-19, unspecified 04/01/2022    INFLUENZA 10/05/2020, 10/26/2021    Influenza Quadrivalent Preservative Free 3 years and older IM 11/24/2015, 09/25/2017    Influenza, high dose seasonal 0.7 mL 09/26/2022    Influenza, injectable, quadrivalent, preservative free 0.5 mL 10/15/2018, 09/23/2019    Influenza, seasonal, injectable 01/07/2013, 12/30/2013       Chapin Rogers MD    I spent 30 minutes with this patient of which greater than 50% was spent counseling or reviewing chart

## 2024-04-02 NOTE — ASSESSMENT & PLAN NOTE
Diabetes.  I had a long discussion with the patient regarding treatment options.  She will initiate Ozempic 0.5 mg once weekly and call within 4 weeks to titrate medication.  Her foot exam is up-to-date  Lab Results   Component Value Date    HGBA1C 6.7 (A) 04/02/2024

## 2024-04-02 NOTE — ASSESSMENT & PLAN NOTE
Hyperlipidemia.  Patient will check lipid panel blood work.  We will make further recommendations pending results of test.

## 2024-04-04 ENCOUNTER — APPOINTMENT (OUTPATIENT)
Dept: LAB | Facility: CLINIC | Age: 69
End: 2024-04-04
Payer: COMMERCIAL

## 2024-04-04 DIAGNOSIS — E03.9 HYPOTHYROIDISM, UNSPECIFIED TYPE: ICD-10-CM

## 2024-04-04 DIAGNOSIS — E78.5 HYPERLIPIDEMIA, UNSPECIFIED HYPERLIPIDEMIA TYPE: ICD-10-CM

## 2024-04-04 DIAGNOSIS — E11.9 TYPE 2 DIABETES MELLITUS WITHOUT COMPLICATION, WITHOUT LONG-TERM CURRENT USE OF INSULIN (HCC): ICD-10-CM

## 2024-04-04 LAB
ALBUMIN SERPL BCP-MCNC: 4.1 G/DL (ref 3.5–5)
ALP SERPL-CCNC: 116 U/L (ref 34–104)
ALT SERPL W P-5'-P-CCNC: 23 U/L (ref 7–52)
ANION GAP SERPL CALCULATED.3IONS-SCNC: 6 MMOL/L (ref 4–13)
AST SERPL W P-5'-P-CCNC: 20 U/L (ref 13–39)
BILIRUB SERPL-MCNC: 0.51 MG/DL (ref 0.2–1)
BUN SERPL-MCNC: 13 MG/DL (ref 5–25)
CALCIUM SERPL-MCNC: 9.4 MG/DL (ref 8.4–10.2)
CHLORIDE SERPL-SCNC: 103 MMOL/L (ref 96–108)
CHOLEST SERPL-MCNC: 246 MG/DL
CO2 SERPL-SCNC: 30 MMOL/L (ref 21–32)
CREAT SERPL-MCNC: 0.9 MG/DL (ref 0.6–1.3)
ERYTHROCYTE [DISTWIDTH] IN BLOOD BY AUTOMATED COUNT: 13.4 % (ref 11.6–15.1)
GFR SERPL CREATININE-BSD FRML MDRD: 65 ML/MIN/1.73SQ M
GLUCOSE P FAST SERPL-MCNC: 107 MG/DL (ref 65–99)
HCT VFR BLD AUTO: 43.7 % (ref 34.8–46.1)
HDLC SERPL-MCNC: 74 MG/DL
HGB BLD-MCNC: 13.9 G/DL (ref 11.5–15.4)
LDLC SERPL CALC-MCNC: 131 MG/DL (ref 0–100)
MCH RBC QN AUTO: 29.7 PG (ref 26.8–34.3)
MCHC RBC AUTO-ENTMCNC: 31.8 G/DL (ref 31.4–37.4)
MCV RBC AUTO: 93 FL (ref 82–98)
NONHDLC SERPL-MCNC: 172 MG/DL
PLATELET # BLD AUTO: 232 THOUSANDS/UL (ref 149–390)
PMV BLD AUTO: 10.7 FL (ref 8.9–12.7)
POTASSIUM SERPL-SCNC: 4.2 MMOL/L (ref 3.5–5.3)
PROT SERPL-MCNC: 6.4 G/DL (ref 6.4–8.4)
RBC # BLD AUTO: 4.68 MILLION/UL (ref 3.81–5.12)
SODIUM SERPL-SCNC: 139 MMOL/L (ref 135–147)
TRIGL SERPL-MCNC: 205 MG/DL
TSH SERPL DL<=0.05 MIU/L-ACNC: 5.5 UIU/ML (ref 0.45–4.5)
WBC # BLD AUTO: 5.86 THOUSAND/UL (ref 4.31–10.16)

## 2024-04-04 PROCEDURE — 36415 COLL VENOUS BLD VENIPUNCTURE: CPT

## 2024-04-04 PROCEDURE — 80053 COMPREHEN METABOLIC PANEL: CPT

## 2024-04-04 PROCEDURE — 85027 COMPLETE CBC AUTOMATED: CPT

## 2024-04-04 PROCEDURE — 84443 ASSAY THYROID STIM HORMONE: CPT

## 2024-04-04 PROCEDURE — 80061 LIPID PANEL: CPT

## 2024-04-08 DIAGNOSIS — E03.9 HYPOTHYROIDISM, UNSPECIFIED TYPE: ICD-10-CM

## 2024-04-08 RX ORDER — LEVOTHYROXINE SODIUM 175 UG/1
175 TABLET ORAL
Qty: 90 TABLET | Refills: 1 | Status: SHIPPED | OUTPATIENT
Start: 2024-04-08

## 2024-05-02 DIAGNOSIS — E11.9 TYPE 2 DIABETES MELLITUS WITHOUT COMPLICATION, WITHOUT LONG-TERM CURRENT USE OF INSULIN (HCC): ICD-10-CM

## 2024-05-02 DIAGNOSIS — E66.9 CLASS 1 OBESITY WITH SERIOUS COMORBIDITY AND BODY MASS INDEX (BMI) OF 32.0 TO 32.9 IN ADULT, UNSPECIFIED OBESITY TYPE: ICD-10-CM

## 2024-05-21 DIAGNOSIS — G47.00 INSOMNIA, UNSPECIFIED TYPE: ICD-10-CM

## 2024-05-22 RX ORDER — TRAZODONE HYDROCHLORIDE 100 MG/1
200 TABLET ORAL
Qty: 180 TABLET | Refills: 1 | Status: SHIPPED | OUTPATIENT
Start: 2024-05-22

## 2024-06-05 DIAGNOSIS — E03.9 HYPOTHYROIDISM, UNSPECIFIED TYPE: ICD-10-CM

## 2024-06-06 DIAGNOSIS — E11.9 TYPE 2 DIABETES MELLITUS WITHOUT COMPLICATION, WITHOUT LONG-TERM CURRENT USE OF INSULIN (HCC): Primary | ICD-10-CM

## 2024-06-06 RX ORDER — LEVOTHYROXINE SODIUM 0.15 MG/1
TABLET ORAL
Qty: 90 TABLET | Refills: 1 | Status: SHIPPED | OUTPATIENT
Start: 2024-06-06

## 2024-07-01 DIAGNOSIS — E11.9 TYPE 2 DIABETES MELLITUS WITHOUT COMPLICATION, WITHOUT LONG-TERM CURRENT USE OF INSULIN (HCC): ICD-10-CM

## 2024-07-10 ENCOUNTER — OFFICE VISIT (OUTPATIENT)
Dept: FAMILY MEDICINE CLINIC | Facility: CLINIC | Age: 69
End: 2024-07-10
Payer: COMMERCIAL

## 2024-07-10 VITALS
HEART RATE: 61 BPM | SYSTOLIC BLOOD PRESSURE: 146 MMHG | DIASTOLIC BLOOD PRESSURE: 80 MMHG | TEMPERATURE: 97.8 F | WEIGHT: 182.25 LBS | BODY MASS INDEX: 33.54 KG/M2 | HEIGHT: 62 IN | OXYGEN SATURATION: 98 % | RESPIRATION RATE: 17 BRPM

## 2024-07-10 DIAGNOSIS — Z00.00 MEDICARE ANNUAL WELLNESS VISIT, SUBSEQUENT: ICD-10-CM

## 2024-07-10 DIAGNOSIS — E03.9 HYPOTHYROIDISM, UNSPECIFIED TYPE: ICD-10-CM

## 2024-07-10 DIAGNOSIS — R63.5 WEIGHT GAIN: ICD-10-CM

## 2024-07-10 DIAGNOSIS — E78.5 HYPERLIPIDEMIA, UNSPECIFIED HYPERLIPIDEMIA TYPE: ICD-10-CM

## 2024-07-10 DIAGNOSIS — E11.9 TYPE 2 DIABETES MELLITUS WITHOUT COMPLICATION, WITHOUT LONG-TERM CURRENT USE OF INSULIN (HCC): Primary | ICD-10-CM

## 2024-07-10 DIAGNOSIS — G47.00 INSOMNIA, UNSPECIFIED TYPE: ICD-10-CM

## 2024-07-10 LAB — SL AMB POCT HEMOGLOBIN AIC: 6 (ref ?–6.5)

## 2024-07-10 PROCEDURE — G0439 PPPS, SUBSEQ VISIT: HCPCS | Performed by: FAMILY MEDICINE

## 2024-07-10 PROCEDURE — 99214 OFFICE O/P EST MOD 30 MIN: CPT | Performed by: FAMILY MEDICINE

## 2024-07-10 PROCEDURE — 83036 HEMOGLOBIN GLYCOSYLATED A1C: CPT | Performed by: FAMILY MEDICINE

## 2024-07-10 NOTE — ASSESSMENT & PLAN NOTE
Diabetes.  Patient doing well with improvement in A1c down to 6.0.  Her eye and foot exams are up-to-date  Lab Results   Component Value Date    HGBA1C 6.0 07/10/2024

## 2024-07-10 NOTE — ASSESSMENT & PLAN NOTE
Weight gain.  Patient was given prescription to increase Ozempic to 2 mg dose once weekly.  She will monitor her weight.  She was recommended to establish a walking regimen of minimum of 15 minutes 3 times a week and titrate upward as tolerated to try and reach 30 minutes 3 to 4 days a week

## 2024-07-10 NOTE — PROGRESS NOTES
Ambulatory Visit  Name: Radha Yu      : 1955      MRN: [id number]  Encounter Provider: Chapin Rogers MD  Encounter Date: 7/10/2024   Encounter department: [facility]    Assessment & Plan   1. Type 2 diabetes mellitus without complication, without long-term current use of insulin ([facility])  Assessment & Plan:  Diabetes.  Patient doing well with improvement in A1c down to 6.0.  Her eye and foot exams are up-to-date  Lab Results   Component Value Date    HGBA1C 6.0 07/10/2024     Orders:  -     POCT hemoglobin A1c  -     semaglutide, 2 mg/dose, (Ozempic) 8 mg/ mL injection pen; Inject 0.75 mL (2 mg total) under the skin every 7 days  -     TSH, 3rd generation; Future  -     Comprehensive metabolic panel; Future  -     Lipid panel; Future  2. Medicare annual wellness visit, subsequent  3. Hyperlipidemia, unspecified hyperlipidemia type  Assessment & Plan:  Hyperlipidemia.  Patient will check lipid panel blood work.  We will make further recommendations pending results of test.  Orders:  -     TSH, 3rd generation; Future  -     Comprehensive metabolic panel; Future  -     Lipid panel; Future  4. Hypothyroidism, unspecified type  Assessment & Plan:  Hypothyroidism.  Patient will check TSH blood work and continue with current dose of levothyroxine  5. Insomnia, unspecified type  Assessment & Plan:  Insomnia.  Patient feels symptoms are fairly well-controlled on current dose of trazodone  6. Weight gain  Assessment & Plan:  Weight gain.  Patient was given prescription to increase Ozempic to 2 mg dose once weekly.  She will monitor her weight.  She was recommended to establish a walking regimen of minimum of 15 minutes 3 times a week and titrate upward as tolerated to try and reach 30 minutes 3 to 4 days a week       Preventive health issues were discussed with patient, and age appropriate screening tests were ordered as noted in patient's After Visit Summary. Personalized health advice and  appropriate referrals for health education or preventive services given if needed, as noted in patient's After Visit Summary.    History of Present Illness     Patient in the office to review chronic medical conditions.  Her A1c has improved to 6.0 with continued use of Ozempic currently at 1 mg weekly.  She denies any recent illness.  Patient does not have a regular form of exercise.  She states she has not been losing much weight despite using Ozempic.       Patient Care Team:  Chapin Rogers MD as PCP - General  Chapin Rogers MD as PCP - PCP-[facility] (E)  DO Chapin Farr MD Marian McDonald, MD as Endoscopist  Tyler Ya OD (Optometry)  Jeannette Friedman as Diabetes Educator (Nutrition)    Review of Systems   Constitutional: Negative.    HENT: Negative.     Eyes: Negative.    Respiratory: Negative.     Cardiovascular: Negative.    Gastrointestinal: Negative.    Genitourinary: Negative.    Musculoskeletal: Negative.    Skin: Negative.    Neurological: Negative.    Psychiatric/Behavioral: Negative.       Medical History Reviewed by provider this encounter:  [facility]       Annual Wellness Visit Questionnaire       Health Risk Assessment:   Patient rates overall health as very good. Patient feels that their physical health rating is same. Patient is very satisfied with their life. Eyesight was rated as same. Hearing was rated as same. Patient feels that their emotional and mental health rating is same. Patients states they are never, rarely angry. Patient states they are never, rarely unusually tired/fatigued. Pain experienced in the last 7 days has been none. Patient states that she has experienced no weight loss or gain in last 6 months.     Fall Risk Screening:   In the past year, patient has experienced: no history of falling in past year      Urinary Incontinence Screening:   Patient has not leaked urine accidently in the last six months.     Home Safety:  Patient does not have trouble  with stairs inside or outside of their home. Patient has working smoke alarms and has working carbon monoxide detector. Home safety hazards include: none.     Nutrition:   Current diet is Regular.     Medications:   Patient is currently taking over-the-counter supplements. OTC medications include: see medication list. Patient is able to manage medications.     Activities of Daily Living (ADLs)/Instrumental Activities of Daily Living (IADLs):   Walk and transfer into and out of bed and chair?: Yes  Dress and groom yourself?: Yes    Bathe or shower yourself?: Yes    Feed yourself? Yes  Do your laundry/housekeeping?: Yes  Manage your money, pay your bills and track your expenses?: Yes  Make your own meals?: Yes    Do your own shopping?: Yes    Previous Hospitalizations:   Any hospitalizations or ED visits within the last 12 months?: No      Advance Care Planning:   Living will: No    Durable POA for healthcare: No    Advanced directive: No      PREVENTIVE SCREENINGS      Cardiovascular Screening:    General: History Lipid Disorder and Screening Current      Diabetes Screening:     General: History Diabetes and Screening Current      Colorectal Cancer Screening:     General: Screening Current      Breast Cancer Screening:     General: Screening Current      Cervical Cancer Screening:    General: Screening Not Indicated      Lung Cancer Screening:     General: Screening Not Indicated    Screening, Brief Intervention, and Referral to Treatment (SBIRT)    Screening  Typical number of drinks in a day: 0  Typical number of drinks in a week: 0  Interpretation: Low risk drinking behavior.    AUDIT-C Screenin) How often did you have a drink containing alcohol in the past year? monthly or less  2) How many drinks did you have on a typical day when you were drinking in the past year? 3 to 4  3) How often did you have 6 or more drinks on one occasion in the past year? never    AUDIT-C Score: 1  Interpretation: Score 0-2  "(female): Negative screen for alcohol misuse    Single Item Drug Screening:  How often have you used an illegal drug (including marijuana) or a prescription medication for non-medical reasons in the past year? never    Single Item Drug Screen Score: 0  Interpretation: Negative screen for possible drug use disorder    Social Determinants of Health     Financial Resource Strain: Low Risk  (5/26/2023)    Overall Financial Resource Strain (CARDIA)     Difficulty of Paying Living Expenses: Not hard at all   Food Insecurity: No Food Insecurity (7/8/2024)    Hunger Vital Sign     Worried About Running Out of Food in the Last Year: Never true     Ran Out of Food in the Last Year: Never true   Transportation Needs: No Transportation Needs (7/8/2024)    PRAPARE - Transportation     Lack of Transportation (Medical): No     Lack of Transportation (Non-Medical): No   Housing Stability: Low Risk  (7/8/2024)    Housing Stability Vital Sign     Unable to Pay for Housing in the Last Year: No     Number of Times Moved in the Last Year: 0     Homeless in the Last Year: No   Utilities: Not At Risk (7/8/2024)    [facility] Utilities     Threatened with loss of utilities: No     No results found.    Objective     /80   Pulse 61   Temp 97.8 °F (36.6 °C)   Resp 17   Ht 5' 2\" (1.575 m)   Wt 82.7 kg (182 lb 4 oz)   SpO2 98%   BMI 33.33 kg/m²     Physical Exam  Vitals and nursing note reviewed.   Constitutional:       General: She is not in acute distress.     Appearance: Normal appearance. She is well-developed. She is not ill-appearing.   HENT:      Head: Normocephalic and atraumatic.   Eyes:      General:         Right eye: No discharge.         Left eye: No discharge.      Extraocular Movements: Extraocular movements intact.      Conjunctiva/sclera: Conjunctivae normal.      Pupils: Pupils are equal, round, and reactive to light.   Neck:      Vascular: No carotid bruit.   Cardiovascular:      Rate and Rhythm: Normal rate and regular " rhythm.      Heart sounds: No murmur heard.  Pulmonary:      Effort: Pulmonary effort is normal. No respiratory distress.      Breath sounds: Normal breath sounds.   Abdominal:      General: Abdomen is flat. Bowel sounds are normal.      Palpations: Abdomen is soft.      Tenderness: There is no abdominal tenderness. There is no guarding or rebound.   Musculoskeletal:      Right lower leg: No edema.      Left lower leg: No edema.   Lymphadenopathy:      Cervical: No cervical adenopathy.   Skin:     General: Skin is warm and dry.      Capillary Refill: Capillary refill takes less than 2 seconds.   Neurological:      Mental Status: She is alert. Mental status is at baseline.   Psychiatric:         Mood and Affect: Mood normal.         Behavior: Behavior normal.         Thought Content: Thought content normal.         Judgment: Judgment normal.     I spent 30 minutes with this patient of which greater than 50% was spent counseling or reviewing chart

## 2024-07-10 NOTE — PATIENT INSTRUCTIONS
Medicare Preventive Visit Patient Instructions  Thank you for completing your Welcome to Medicare Visit or Medicare Annual Wellness Visit today. Your next wellness visit will be due in one year (7/11/2025).  The screening/preventive services that you may require over the next 5-10 years are detailed below. Some tests may not apply to you based off risk factors and/or age. Screening tests ordered at today's visit but not completed yet may show as past due. Also, please note that scanned in results may not display below.  Preventive Screenings:  Service Recommendations Previous Testing/Comments   Colorectal Cancer Screening  * Colonoscopy    * Fecal Occult Blood Test (FOBT)/Fecal Immunochemical Test (FIT)  * Fecal DNA/Cologuard Test  * Flexible Sigmoidoscopy Age: 45-75 years old   Colonoscopy: every 10 years (may be performed more frequently if at higher risk)  OR  FOBT/FIT: every 1 year  OR  Cologuard: every 3 years  OR  Sigmoidoscopy: every 5 years  Screening may be recommended earlier than age 45 if at higher risk for colorectal cancer. Also, an individualized decision between you and your healthcare provider will decide whether screening between the ages of 76-85 would be appropriate. Colonoscopy: 03/17/2023  FOBT/FIT: Not on file  Cologuard: Not on file  Sigmoidoscopy: Not on file    Screening Current     Breast Cancer Screening Age: 40+ years old  Frequency: every 1-2 years  Not required if history of left and right mastectomy Mammogram: 05/10/2023    Screening Current   Cervical Cancer Screening Between the ages of 21-29, pap smear recommended once every 3 years.   Between the ages of 30-65, can perform pap smear with HPV co-testing every 5 years.   Recommendations may differ for women with a history of total hysterectomy, cervical cancer, or abnormal pap smears in past. Pap Smear: 03/10/2023    Screening Not Indicated   Hepatitis C Screening Once for adults born between 1945 and 1965  More frequently in  patients at high risk for Hepatitis C Hep C Antibody: Not on file        Diabetes Screening 1-2 times per year if you're at risk for diabetes or have pre-diabetes Fasting glucose: 107 mg/dL (4/4/2024)  A1C: 6.7 (4/2/2024)  Screening Not Indicated  History Diabetes   Cholesterol Screening Once every 5 years if you don't have a lipid disorder. May order more often based on risk factors. Lipid panel: 04/04/2024    Screening Not Indicated  History Lipid Disorder     Other Preventive Screenings Covered by Medicare:  Abdominal Aortic Aneurysm (AAA) Screening: covered once if your at risk. You're considered to be at risk if you have a family history of AAA.  Lung Cancer Screening: covers low dose CT scan once per year if you meet all of the following conditions: (1) Age 55-77; (2) No signs or symptoms of lung cancer; (3) Current smoker or have quit smoking within the last 15 years; (4) You have a tobacco smoking history of at least 20 pack years (packs per day multiplied by number of years you smoked); (5) You get a written order from a healthcare provider.  Glaucoma Screening: covered annually if you're considered high risk: (1) You have diabetes OR (2) Family history of glaucoma OR (3)  aged 50 and older OR (4)  American aged 65 and older  Osteoporosis Screening: covered every 2 years if you meet one of the following conditions: (1) You're estrogen deficient and at risk for osteoporosis based off medical history and other findings; (2) Have a vertebral abnormality; (3) On glucocorticoid therapy for more than 3 months; (4) Have primary hyperparathyroidism; (5) On osteoporosis medications and need to assess response to drug therapy.   Last bone density test (DXA Scan): 05/10/2023.  HIV Screening: covered annually if you're between the age of 15-65. Also covered annually if you are younger than 15 and older than 65 with risk factors for HIV infection. For pregnant patients, it is covered up to 3  times per pregnancy.    Immunizations:  Immunization Recommendations   Influenza Vaccine Annual influenza vaccination during flu season is recommended for all persons aged >= 6 months who do not have contraindications   Pneumococcal Vaccine   * Pneumococcal conjugate vaccine = PCV13 (Prevnar 13), PCV15 (Vaxneuvance), PCV20 (Prevnar 20)  * Pneumococcal polysaccharide vaccine = PPSV23 (Pneumovax) Adults 19-65 yo with certain risk factors or if 65+ yo  If never received any pneumonia vaccine: recommend Prevnar 20 (PCV20)  Give PCV20 if previously received 1 dose of PCV13 or PPSV23   Hepatitis B Vaccine 3 dose series if at intermediate or high risk (ex: diabetes, end stage renal disease, liver disease)   Respiratory syncytial virus (RSV) Vaccine - COVERED BY MEDICARE PART D  * RSVPreF3 (Arexvy) CDC recommends that adults 60 years of age and older may receive a single dose of RSV vaccine using shared clinical decision-making (SCDM)   Tetanus (Td) Vaccine - COST NOT COVERED BY MEDICARE PART B Following completion of primary series, a booster dose should be given every 10 years to maintain immunity against tetanus. Td may also be given as tetanus wound prophylaxis.   Tdap Vaccine - COST NOT COVERED BY MEDICARE PART B Recommended at least once for all adults. For pregnant patients, recommended with each pregnancy.   Shingles Vaccine (Shingrix) - COST NOT COVERED BY MEDICARE PART B  2 shot series recommended in those 19 years and older who have or will have weakened immune systems or those 50 years and older     Health Maintenance Due:      Topic Date Due   • Hepatitis C Screening  Never done   • Breast Cancer Screening: Mammogram  05/10/2025   • Cervical Cancer Screening  03/10/2028   • Colorectal Cancer Screening  03/15/2028     Immunizations Due:      Topic Date Due   • Pneumococcal Vaccine: 65+ Years (1 of 2 - PCV) Never done   • Influenza Vaccine (1) 09/01/2024     Advance Directives   What are advance directives?   Advance directives are legal documents that state your wishes and plans for medical care. These plans are made ahead of time in case you lose your ability to make decisions for yourself. Advance directives can apply to any medical decision, such as the treatments you want, and if you want to donate organs.   What are the types of advance directives?  There are many types of advance directives, and each state has rules about how to use them. You may choose a combination of any of the following:  Living will:  This is a written record of the treatment you want. You can also choose which treatments you do not want, which to limit, and which to stop at a certain time. This includes surgery, medicine, IV fluid, and tube feedings.   Durable power of  for healthcare (DPAHC):  This is a written record that states who you want to make healthcare choices for you when you are unable to make them for yourself. This person, called a proxy, is usually a family member or a friend. You may choose more than 1 proxy.  Do not resuscitate (DNR) order:  A DNR order is used in case your heart stops beating or you stop breathing. It is a request not to have certain forms of treatment, such as CPR. A DNR order may be included in other types of advance directives.  Medical directive:  This covers the care that you want if you are in a coma, near death, or unable to make decisions for yourself. You can list the treatments you want for each condition. Treatment may include pain medicine, surgery, blood transfusions, dialysis, IV or tube feedings, and a ventilator (breathing machine).  Values history:  This document has questions about your views, beliefs, and how you feel and think about life. This information can help others choose the care that you would choose.  Why are advance directives important?  An advance directive helps you control your care. Although spoken wishes may be used, it is better to have your wishes written down.  Spoken wishes can be misunderstood, or not followed. Treatments may be given even if you do not want them. An advance directive may make it easier for your family to make difficult choices about your care.   Weight Management   Why it is important to manage your weight:  Being overweight increases your risk of health conditions such as heart disease, high blood pressure, type 2 diabetes, and certain types of cancer. It can also increase your risk for osteoarthritis, sleep apnea, and other respiratory problems. Aim for a slow, steady weight loss. Even a small amount of weight loss can lower your risk of health problems.  How to lose weight safely:  A safe and healthy way to lose weight is to eat fewer calories and get regular exercise. You can lose up about 1 pound a week by decreasing the number of calories you eat by 500 calories each day.   Healthy meal plan for weight management:  A healthy meal plan includes a variety of foods, contains fewer calories, and helps you stay healthy. A healthy meal plan includes the following:  Eat whole-grain foods more often.  A healthy meal plan should contain fiber. Fiber is the part of grains, fruits, and vegetables that is not broken down by your body. Whole-grain foods are healthy and provide extra fiber in your diet. Some examples of whole-grain foods are whole-wheat breads and pastas, oatmeal, brown rice, and bulgur.  Eat a variety of vegetables every day.  Include dark, leafy greens such as spinach, kale, jeanmarie greens, and mustard greens. Eat yellow and orange vegetables such as carrots, sweet potatoes, and winter squash.   Eat a variety of fruits every day.  Choose fresh or canned fruit (canned in its own juice or light syrup) instead of juice. Fruit juice has very little or no fiber.  Eat low-fat dairy foods.  Drink fat-free (skim) milk or 1% milk. Eat fat-free yogurt and low-fat cottage cheese. Try low-fat cheeses such as mozzarella and other reduced-fat  cheeses.  Choose meat and other protein foods that are low in fat.  Choose beans or other legumes such as split peas or lentils. Choose fish, skinless poultry (chicken or turkey), or lean cuts of red meat (beef or pork). Before you cook meat or poultry, cut off any visible fat.   Use less fat and oil.  Try baking foods instead of frying them. Add less fat, such as margarine, sour cream, regular salad dressing and mayonnaise to foods. Eat fewer high-fat foods. Some examples of high-fat foods include french fries, doughnuts, ice cream, and cakes.  Eat fewer sweets.  Limit foods and drinks that are high in sugar. This includes candy, cookies, regular soda, and sweetened drinks.  Exercise:  Exercise at least 30 minutes per day on most days of the week. Some examples of exercise include walking, biking, dancing, and swimming. You can also fit in more physical activity by taking the stairs instead of the elevator or parking farther away from stores. Ask your healthcare provider about the best exercise plan for you.      © Copyright [employer] 2018 Information is for End User's use only and may not be sold, redistributed or otherwise used for commercial purposes. All illustrations and images included in CareNotes® are the copyrighted property of [employer].A[employer]M., Inc. or [employer]

## 2024-07-12 ENCOUNTER — APPOINTMENT (OUTPATIENT)
Dept: LAB | Facility: CLINIC | Age: 69
End: 2024-07-12
Payer: COMMERCIAL

## 2024-07-12 DIAGNOSIS — E11.9 TYPE 2 DIABETES MELLITUS WITHOUT COMPLICATION, WITHOUT LONG-TERM CURRENT USE OF INSULIN (HCC): ICD-10-CM

## 2024-07-12 DIAGNOSIS — E78.5 HYPERLIPIDEMIA, UNSPECIFIED HYPERLIPIDEMIA TYPE: ICD-10-CM

## 2024-07-12 LAB
ALBUMIN SERPL BCG-MCNC: 3.9 G/DL (ref 3.5–5)
ALP SERPL-CCNC: 103 U/L (ref 34–104)
ALT SERPL W P-5'-P-CCNC: 21 U/L (ref 7–52)
ANION GAP SERPL CALCULATED.3IONS-SCNC: 7 MMOL/L (ref 4–13)
AST SERPL W P-5'-P-CCNC: 19 U/L (ref 13–39)
BILIRUB SERPL-MCNC: 0.71 MG/DL (ref 0.2–1)
BUN SERPL-MCNC: 13 MG/DL (ref 5–25)
CALCIUM SERPL-MCNC: 9.3 MG/DL (ref 8.4–10.2)
CHLORIDE SERPL-SCNC: 102 MMOL/L (ref 96–108)
CHOLEST SERPL-MCNC: 230 MG/DL
CO2 SERPL-SCNC: 29 MMOL/L (ref 21–32)
CREAT SERPL-MCNC: 0.93 MG/DL (ref 0.6–1.3)
GFR SERPL CREATININE-BSD FRML MDRD: 62 ML/MIN/1.73SQ M
GLUCOSE P FAST SERPL-MCNC: 88 MG/DL (ref 65–99)
HDLC SERPL-MCNC: 66 MG/DL
LDLC SERPL CALC-MCNC: 129 MG/DL (ref 0–100)
NONHDLC SERPL-MCNC: 164 MG/DL
POTASSIUM SERPL-SCNC: 4.4 MMOL/L (ref 3.5–5.3)
PROT SERPL-MCNC: 6.6 G/DL (ref 6.4–8.4)
SODIUM SERPL-SCNC: 138 MMOL/L (ref 135–147)
TRIGL SERPL-MCNC: 177 MG/DL
TSH SERPL DL<=0.05 MIU/L-ACNC: 4.78 UIU/ML (ref 0.45–4.5)

## 2024-07-12 PROCEDURE — 84443 ASSAY THYROID STIM HORMONE: CPT

## 2024-07-12 PROCEDURE — 36415 COLL VENOUS BLD VENIPUNCTURE: CPT

## 2024-07-12 PROCEDURE — 80053 COMPREHEN METABOLIC PANEL: CPT

## 2024-07-12 PROCEDURE — 80061 LIPID PANEL: CPT

## 2024-07-15 DIAGNOSIS — E03.9 HYPOTHYROIDISM, UNSPECIFIED TYPE: ICD-10-CM

## 2024-07-15 RX ORDER — LEVOTHYROXINE SODIUM 0.2 MG/1
200 TABLET ORAL
Qty: 90 TABLET | Refills: 1 | Status: SHIPPED | OUTPATIENT
Start: 2024-07-15

## 2024-07-16 NOTE — ASSESSMENT & PLAN NOTE
Hyperlipidemia.  Patient will check lipid panel blood work.  We will make further recommendations pending results of test.   yes

## 2024-08-11 DIAGNOSIS — E11.9 TYPE 2 DIABETES MELLITUS WITHOUT COMPLICATION, WITHOUT LONG-TERM CURRENT USE OF INSULIN (HCC): ICD-10-CM

## 2024-09-04 DIAGNOSIS — E11.9 TYPE 2 DIABETES MELLITUS WITHOUT COMPLICATION, WITHOUT LONG-TERM CURRENT USE OF INSULIN (HCC): ICD-10-CM

## 2024-09-09 ENCOUNTER — VBI (OUTPATIENT)
Dept: ADMINISTRATIVE | Facility: OTHER | Age: 69
End: 2024-09-09

## 2024-09-09 NOTE — TELEPHONE ENCOUNTER
09/09/24 12:59 PM     Chart reviewed for [facility] - BP Level was/were not submitted to the patient's insurance.     Emelina Mercado MA   [facility]

## 2024-10-08 DIAGNOSIS — E11.9 TYPE 2 DIABETES MELLITUS WITHOUT COMPLICATION, WITHOUT LONG-TERM CURRENT USE OF INSULIN (HCC): ICD-10-CM

## 2024-11-05 DIAGNOSIS — E11.9 TYPE 2 DIABETES MELLITUS WITHOUT COMPLICATION, WITHOUT LONG-TERM CURRENT USE OF INSULIN (HCC): ICD-10-CM

## 2024-11-05 DIAGNOSIS — G47.00 INSOMNIA, UNSPECIFIED TYPE: ICD-10-CM

## 2024-11-06 RX ORDER — TRAZODONE HYDROCHLORIDE 100 MG/1
200 TABLET ORAL
Qty: 180 TABLET | Refills: 1 | Status: SHIPPED | OUTPATIENT
Start: 2024-11-06

## 2024-11-12 NOTE — TELEPHONE ENCOUNTER
Reason for call:   [x] Refill   [] Prior Auth  [x] Other: Per Pt pharmacy informed her of not having any refills left and her next dose is due tomorrow.    Office:   [x] PCP/Provider - TOM Cruz   [] Specialty/Provider -     Medication: (Ozempic) 8 mg     Dose/Frequency: 0.75 ml every 7 days / 3 ml        Pharmacy: [id number]  [place] [address]      Does the patient have enough for 3 days?   [] Yes   [x] No - Send as HP to POD

## 2024-11-14 ENCOUNTER — VBI (OUTPATIENT)
Dept: ADMINISTRATIVE | Facility: OTHER | Age: 69
End: 2024-11-14

## 2024-11-14 NOTE — TELEPHONE ENCOUNTER
11/14/24 8:30 AM     Chart reviewed for Blood Pressure was/were not submitted to the patient's insurance.     Emelina Mercado MA   [facility]

## 2024-12-02 ENCOUNTER — OFFICE VISIT (OUTPATIENT)
Dept: FAMILY MEDICINE CLINIC | Facility: CLINIC | Age: 69
End: 2024-12-02
Payer: COMMERCIAL

## 2024-12-02 VITALS
SYSTOLIC BLOOD PRESSURE: 124 MMHG | HEART RATE: 69 BPM | RESPIRATION RATE: 16 BRPM | BODY MASS INDEX: 30.59 KG/M2 | OXYGEN SATURATION: 98 % | DIASTOLIC BLOOD PRESSURE: 84 MMHG | TEMPERATURE: 97.6 F | HEIGHT: 62 IN | WEIGHT: 166.2 LBS

## 2024-12-02 DIAGNOSIS — R39.9 UTI SYMPTOMS: ICD-10-CM

## 2024-12-02 LAB
SL AMB  POCT GLUCOSE, UA: ABNORMAL
SL AMB LEUKOCYTE ESTERASE,UA: ABNORMAL
SL AMB POCT BILIRUBIN,UA: ABNORMAL
SL AMB POCT BLOOD,UA: ABNORMAL
SL AMB POCT CLARITY,UA: ABNORMAL
SL AMB POCT COLOR,UA: YELLOW
SL AMB POCT KETONES,UA: ABNORMAL
SL AMB POCT NITRITE,UA: ABNORMAL
SL AMB POCT PH,UA: 6
SL AMB POCT SPECIFIC GRAVITY,UA: 1.01
SL AMB POCT URINE PROTEIN: ABNORMAL
SL AMB POCT UROBILINOGEN: ABNORMAL

## 2024-12-02 PROCEDURE — 87086 URINE CULTURE/COLONY COUNT: CPT | Performed by: NURSE PRACTITIONER

## 2024-12-02 PROCEDURE — 81002 URINALYSIS NONAUTO W/O SCOPE: CPT | Performed by: NURSE PRACTITIONER

## 2024-12-02 PROCEDURE — 87186 SC STD MICRODIL/AGAR DIL: CPT | Performed by: NURSE PRACTITIONER

## 2024-12-02 PROCEDURE — 87077 CULTURE AEROBIC IDENTIFY: CPT | Performed by: NURSE PRACTITIONER

## 2024-12-02 PROCEDURE — 99213 OFFICE O/P EST LOW 20 MIN: CPT | Performed by: NURSE PRACTITIONER

## 2024-12-02 PROCEDURE — G2211 COMPLEX E/M VISIT ADD ON: HCPCS | Performed by: NURSE PRACTITIONER

## 2024-12-02 RX ORDER — CEPHALEXIN 500 MG/1
500 CAPSULE ORAL EVERY 8 HOURS SCHEDULED
Qty: 21 CAPSULE | Refills: 0 | Status: SHIPPED | OUTPATIENT
Start: 2024-12-02 | End: 2024-12-09

## 2024-12-02 NOTE — PROGRESS NOTES
Name: Radha Yu      : 1955      MRN: [id number]  Encounter Provider: TOM Cruz  Encounter Date: 2024   Encounter department: [facility]  :  Assessment & Plan  UTI symptoms  Urine dip positive for leuks and blood.   Will start Keflex based on symptoms and dip results.   Await urine culture results--office will call with results when available.   Call me for any persisting or worsening symptoms.     Orders:    POCT urine dip    Urine culture; Future    cephalexin (KEFLEX) 500 mg capsule; Take 1 capsule (500 mg total) by mouth every 8 (eight) hours for 7 days           History of Present Illness     Radha Yu is a 69 year old female presenting today for UTI symptoms.     UTI symptoms started Friday, 3 days ago.     Trying OTC product, which provides relief short term.    Symptoms include: urgency, burning, frequency.   No fevers, chills, back pain, nausea or vomiting.     Intermittent symptoms over the last one month.   Now consistent.     Last UTI 20 years ago.      Review of Systems   Constitutional: Negative.    Genitourinary:  Positive for dysuria, frequency and urgency. Negative for decreased urine volume, difficulty urinating, flank pain, hematuria and pelvic pain.     Current Outpatient Medications on File Prior to Visit   Medication Sig Dispense Refill    levothyroxine (Euthyrox) 200 mcg tablet Take 1 tablet (200 mcg total) by mouth daily in the early morning 90 tablet 1    Multiple Vitamins-Minerals (MULTIVITAMIN WOMEN 50+ PO) Take 1 caplet by mouth daily      semaglutide, 2 mg/dose, (Ozempic) 8 mg/ mL injection pen Inject 0.75 mL (2 mg total) under the skin every 7 days 3 mL 5    traZODone (DESYREL) 100 mg tablet Take 2 tablets (200 mg total) by mouth daily at bedtime 180 tablet 1     No current facility-administered medications on file prior to visit.         Objective   /84 (BP Location: Left arm, Patient Position: Sitting, Cuff Size: Standard)    "Pulse 69   Temp 97.6 °F (36.4 °C) (Temporal)   Resp 16   Ht 5' 2\" (1.575 m)   Wt 75.4 kg (166 lb 3.2 oz)   SpO2 98%   BMI 30.40 kg/m²      Physical Exam  Vitals and nursing note reviewed.   Constitutional:       General: She is not in acute distress.     Appearance: Normal appearance. She is not ill-appearing.   Cardiovascular:      Rate and Rhythm: Normal rate and regular rhythm.      Heart sounds: No murmur heard.  Pulmonary:      Effort: Pulmonary effort is normal.      Breath sounds: Normal breath sounds.   Abdominal:      General: Bowel sounds are normal.      Palpations: Abdomen is soft.      Tenderness: There is no abdominal tenderness. There is no right CVA tenderness or left CVA tenderness.   Musculoskeletal:      Right lower leg: No edema.      Left lower leg: No edema.   Neurological:      Mental Status: She is alert.   Psychiatric:         Mood and Affect: Mood normal.         "

## 2024-12-04 ENCOUNTER — RESULTS FOLLOW-UP (OUTPATIENT)
Dept: FAMILY MEDICINE CLINIC | Facility: CLINIC | Age: 69
End: 2024-12-04

## 2024-12-04 LAB — BACTERIA UR CULT: ABNORMAL

## 2024-12-04 NOTE — TELEPHONE ENCOUNTER
----- Message from TOM Pringle sent at 12/4/2024  5:08 PM EST -----  Urine culture is positive for infection. The antibiotic she is taking should clear the infection, finish the whole course. Call for any persisting symptoms.

## 2024-12-12 ENCOUNTER — CLINICAL SUPPORT (OUTPATIENT)
Dept: FAMILY MEDICINE CLINIC | Facility: CLINIC | Age: 69
End: 2024-12-12
Payer: COMMERCIAL

## 2024-12-12 ENCOUNTER — PATIENT MESSAGE (OUTPATIENT)
Dept: FAMILY MEDICINE CLINIC | Facility: CLINIC | Age: 69
End: 2024-12-12

## 2024-12-12 DIAGNOSIS — R39.9 URINARY TRACT INFECTION SYMPTOMS: Primary | ICD-10-CM

## 2024-12-12 LAB
SL AMB  POCT GLUCOSE, UA: NEGATIVE
SL AMB LEUKOCYTE ESTERASE,UA: 70
SL AMB POCT BILIRUBIN,UA: NEGATIVE
SL AMB POCT BLOOD,UA: ABNORMAL
SL AMB POCT CLARITY,UA: ABNORMAL
SL AMB POCT COLOR,UA: YELLOW
SL AMB POCT KETONES,UA: NEGATIVE
SL AMB POCT NITRITE,UA: NEGATIVE
SL AMB POCT PH,UA: 5
SL AMB POCT SPECIFIC GRAVITY,UA: 1.03
SL AMB POCT URINE PROTEIN: 30
SL AMB POCT UROBILINOGEN: 0.2

## 2024-12-12 PROCEDURE — 81002 URINALYSIS NONAUTO W/O SCOPE: CPT

## 2024-12-12 PROCEDURE — 87086 URINE CULTURE/COLONY COUNT: CPT | Performed by: FAMILY MEDICINE

## 2024-12-12 PROCEDURE — 99211 OFF/OP EST MAY X REQ PHY/QHP: CPT

## 2024-12-12 PROCEDURE — 81001 URINALYSIS AUTO W/SCOPE: CPT | Performed by: FAMILY MEDICINE

## 2024-12-13 ENCOUNTER — RESULTS FOLLOW-UP (OUTPATIENT)
Dept: FAMILY MEDICINE CLINIC | Facility: CLINIC | Age: 69
End: 2024-12-13

## 2024-12-13 DIAGNOSIS — N30.01 ACUTE CYSTITIS WITH HEMATURIA: Primary | ICD-10-CM

## 2024-12-13 LAB
AMORPH URATE CRY URNS QL MICRO: ABNORMAL
BACTERIA UR QL AUTO: ABNORMAL /HPF
BILIRUB UR QL STRIP: NEGATIVE
CLARITY UR: ABNORMAL
COLOR UR: YELLOW
GLUCOSE UR STRIP-MCNC: NEGATIVE MG/DL
GRAN CASTS #/AREA URNS LPF: ABNORMAL /[LPF]
HGB UR QL STRIP.AUTO: ABNORMAL
KETONES UR STRIP-MCNC: NEGATIVE MG/DL
LEUKOCYTE ESTERASE UR QL STRIP: ABNORMAL
NITRITE UR QL STRIP: NEGATIVE
NON-SQ EPI CELLS URNS QL MICRO: ABNORMAL /HPF
PH UR STRIP.AUTO: 5 [PH]
PROT UR STRIP-MCNC: ABNORMAL MG/DL
RBC #/AREA URNS AUTO: ABNORMAL /HPF
SP GR UR STRIP.AUTO: 1.02 (ref 1–1.03)
UROBILINOGEN UR STRIP-ACNC: <2 MG/DL
WBC #/AREA URNS AUTO: ABNORMAL /HPF

## 2024-12-13 RX ORDER — NITROFURANTOIN 25; 75 MG/1; MG/1
100 CAPSULE ORAL 2 TIMES DAILY
Qty: 10 CAPSULE | Refills: 0 | Status: SHIPPED | OUTPATIENT
Start: 2024-12-13 | End: 2024-12-18

## 2024-12-14 LAB — BACTERIA UR CULT: NORMAL

## 2025-01-01 DIAGNOSIS — E03.9 HYPOTHYROIDISM, UNSPECIFIED TYPE: ICD-10-CM

## 2025-01-02 RX ORDER — LEVOTHYROXINE SODIUM 200 UG/1
TABLET ORAL
Qty: 90 TABLET | Refills: 1 | Status: SHIPPED | OUTPATIENT
Start: 2025-01-02

## 2025-01-13 ENCOUNTER — RA CDI HCC (OUTPATIENT)
Dept: OTHER | Facility: HOSPITAL | Age: 70
End: 2025-01-13

## 2025-01-14 ENCOUNTER — OFFICE VISIT (OUTPATIENT)
Dept: FAMILY MEDICINE CLINIC | Facility: CLINIC | Age: 70
End: 2025-01-14
Payer: COMMERCIAL

## 2025-01-14 VITALS
TEMPERATURE: 97.8 F | WEIGHT: 166.38 LBS | HEIGHT: 62 IN | RESPIRATION RATE: 18 BRPM | OXYGEN SATURATION: 98 % | HEART RATE: 78 BPM | BODY MASS INDEX: 30.62 KG/M2 | SYSTOLIC BLOOD PRESSURE: 140 MMHG | DIASTOLIC BLOOD PRESSURE: 90 MMHG

## 2025-01-14 DIAGNOSIS — E78.49 OTHER HYPERLIPIDEMIA: ICD-10-CM

## 2025-01-14 DIAGNOSIS — E03.9 ACQUIRED HYPOTHYROIDISM: ICD-10-CM

## 2025-01-14 DIAGNOSIS — E11.9 TYPE 2 DIABETES MELLITUS WITHOUT COMPLICATION, WITHOUT LONG-TERM CURRENT USE OF INSULIN (HCC): Primary | ICD-10-CM

## 2025-01-14 LAB
CREAT UR-MCNC: 137.7 MG/DL
MICROALBUMIN UR-MCNC: 11.2 MG/L
MICROALBUMIN/CREAT 24H UR: 8 MG/G CREATININE (ref 0–30)
SL AMB POCT HEMOGLOBIN AIC: 5.7 (ref ?–6.5)

## 2025-01-14 PROCEDURE — 83036 HEMOGLOBIN GLYCOSYLATED A1C: CPT | Performed by: FAMILY MEDICINE

## 2025-01-14 PROCEDURE — 99214 OFFICE O/P EST MOD 30 MIN: CPT | Performed by: FAMILY MEDICINE

## 2025-01-14 PROCEDURE — 82570 ASSAY OF URINE CREATININE: CPT | Performed by: FAMILY MEDICINE

## 2025-01-14 PROCEDURE — 82043 UR ALBUMIN QUANTITATIVE: CPT | Performed by: FAMILY MEDICINE

## 2025-01-14 NOTE — PROGRESS NOTES
FAMILY PRACTICE OFFICE VISIT       NAME: Radha Yu  AGE: 69 y.o. SEX: female       : 1955        MRN: [id number]    DATE: 1/15/2025  TIME: 6:15 AM    Assessment and Plan     Problem List Items Addressed This Visit       Hyperlipidemia    Hyperlipidemia.  Patient will check lipid panel blood work.  We will make further recommendations pending results of test.         Hypothyroidism    Hypothyroidism.  Patient will check TSH blood work and continue with current dose of levothyroxine         Relevant Orders    CBC    Comprehensive metabolic panel    Lipid panel    TSH, 3rd generation    Type 2 diabetes mellitus (HCC) - Primary    Diabetes.  A1c has reduced to 5.7.  Patient was recommended to incorporate a walking regimen to assist with weight loss  Lab Results   Component Value Date    HGBA1C 5.7 2025            Relevant Orders    Albumin / creatinine urine ratio (Completed)    POCT hemoglobin A1c (Completed)    CBC    Comprehensive metabolic panel    Lipid panel    TSH, 3rd generation           Chief Complaint     Chief Complaint   Patient presents with    Follow-up     6 month     Diabetes     Foot exam shoes & socks remove        History of Present Illness     Patient in the office to review chronic medical condition.  She continues to use Ozempic and is satisfied with her weight loss.  She does not obtain any regular form of exercise at this time.  She denies any recent illness        Review of Systems   Review of Systems   Constitutional: Negative.    HENT: Negative.     Eyes: Negative.    Respiratory: Negative.     Cardiovascular: Negative.    Gastrointestinal: Negative.    Genitourinary: Negative.    Musculoskeletal: Negative.    Skin: Negative.    Neurological: Negative.    Psychiatric/Behavioral: Negative.         Active Problem List     Patient Active Problem List   Diagnosis    Hyperlipidemia    Hypothyroidism    Onychomycosis    Rectocele    Hypoglycemia    Type 2 diabetes mellitus  (HCC)    Epigastric pain    Insomnia    Weight gain    LUQ pain    Preoperative evaluation of a medical condition to rule out surgical contraindications (TAR required)    History of melanoma       Past Medical History:  Past Medical History:   Diagnosis Date    Colon polyp     COVID-19     5/2022    Diabetes mellitus (HCC)     controlled with diet    Disease of thyroid gland     hypo    Hyperlipidemia     Hypertension     Insomnia     Melanoma (HCC) 2016    multiple    Rectocele     Screening for colon cancer     Skin cancer        Past Surgical History:  Past Surgical History:   Procedure Laterality Date    CHOLECYSTECTOMY      COLONOSCOPY      GASTRECTOMY  2001    IA COLONOSCOPY FLX DX W/COLLJ SPEC WHEN PFRMD N/A 10/31/2017    Procedure: COLONOSCOPY with bx and tatoo at 20cm, polypectomies x2;  Surgeon: Jerica Hester MD;  Location: [facility];  Service: General    IA REPAIR RECTOCELE SEPARATE PROCEDURE N/A 6/5/2023    Procedure: RECTOCELE REPAIR;  Surgeon: Cisco Unger MD;  Location: [facility];  Service: UroGynecology           SKIN CANCER EXCISION      left neck areal; left upper arm; back       Family History:  Family History   Problem Relation Age of Onset    Dementia Mother     Diabetes Mother     Hyperlipidemia Father     Heart attack Father     Diabetes Father     Heart disease Father     Hypertension Father     No Known Problems Sister     No Known Problems Sister     No Known Problems Sister     No Known Problems Sister     No Known Problems Daughter     No Known Problems Maternal Grandmother     No Known Problems Maternal Grandfather     No Known Problems Paternal Grandmother     No Known Problems Paternal Grandfather     Melanoma Son 20    No Known Problems Maternal Aunt     No Known Problems Maternal Aunt     No Known Problems Paternal Aunt        Social History:  Social History     Socioeconomic History    Marital status: /Civil Union     Spouse name: Not on file    Number of children: 2     Years of education: Not on file    Highest education level: Not on file   Occupational History    Not on file   Tobacco Use    Smoking status: Never    Smokeless tobacco: Never   Vaping Use    Vaping status: Never Used   Substance and Sexual Activity    Alcohol use: Not Currently     Comment: rarely    Drug use: No    Sexual activity: Yes     Partners: Male     Birth control/protection: Post-menopausal   Other Topics Concern    Not on file   Social History Narrative    Caffeine use     Social Drivers of Health     Financial Resource Strain: Low Risk  (5/26/2023)    Overall Financial Resource Strain (CARDIA)     Difficulty of Paying Living Expenses: Not hard at all   Food Insecurity: No Food Insecurity (7/8/2024)    Nursing - Inadequate Food Risk Classification     Worried About Running Out of Food in the Last Year: Never true     Ran Out of Food in the Last Year: Never true     Ran Out of Food in the Last Year: Not on file   Transportation Needs: No Transportation Needs (7/8/2024)    PRAPARE - Transportation     Lack of Transportation (Medical): No     Lack of Transportation (Non-Medical): No   Physical Activity: Not on file   Stress: Not on file   Social Connections: Not on file   Intimate Partner Violence: Not on file   Housing Stability: Low Risk  (7/8/2024)    Housing Stability Vital Sign     Unable to Pay for Housing in the Last Year: No     Number of Times Moved in the Last Year: 0     Homeless in the Last Year: No       Objective     Vitals:    01/14/25 1414   BP: 140/90   Pulse: 78   Resp: 18   Temp: 97.8 °F (36.6 °C)   SpO2: 98%     Wt Readings from Last 3 Encounters:   01/14/25 75.5 kg (166 lb 6 oz)   12/02/24 75.4 kg (166 lb 3.2 oz)   07/10/24 82.7 kg (182 lb 4 oz)       Physical Exam  Constitutional:       General: She is not in acute distress.     Appearance: Normal appearance. She is not ill-appearing.   HENT:      Head: Normocephalic and atraumatic.   Eyes:      General:         Right eye: No  discharge.         Left eye: No discharge.      Extraocular Movements: Extraocular movements intact.      Conjunctiva/sclera: Conjunctivae normal.      Pupils: Pupils are equal, round, and reactive to light.   Neck:      Vascular: No carotid bruit.   Cardiovascular:      Rate and Rhythm: Normal rate and regular rhythm.      Heart sounds: Normal heart sounds. No murmur heard.  Pulmonary:      Effort: Pulmonary effort is normal.      Breath sounds: Normal breath sounds. No wheezing, rhonchi or rales.   Abdominal:      General: Abdomen is flat. Bowel sounds are normal. There is no distension.      Palpations: Abdomen is soft.      Tenderness: There is no abdominal tenderness. There is no guarding or rebound.   Musculoskeletal:      Right lower leg: No edema.      Left lower leg: No edema.   Lymphadenopathy:      Cervical: No cervical adenopathy.   Skin:     Findings: No rash.   Neurological:      General: No focal deficit present.      Mental Status: She is alert and oriented to person, place, and time.      Cranial Nerves: No cranial nerve deficit.   Psychiatric:         Mood and Affect: Mood normal.         Behavior: Behavior normal.         Thought Content: Thought content normal.         Judgment: Judgment normal.         Pertinent Laboratory/Diagnostic Studies:  Lab Results   Component Value Date    BUN 13 07/12/2024    CREATININE 0.93 07/12/2024    CALCIUM 9.3 07/12/2024     06/15/2016    K 4.4 07/12/2024    CO2 29 07/12/2024     07/12/2024     Lab Results   Component Value Date    ALT 21 07/12/2024    AST 19 07/12/2024    ALKPHOS 103 07/12/2024    BILITOT 0.5 06/15/2016       Lab Results   Component Value Date    WBC 5.86 04/04/2024    HGB 13.9 04/04/2024    HCT 43.7 04/04/2024    MCV 93 04/04/2024     04/04/2024       Lab Results   Component Value Date    TSH 7.50 (H) 03/12/2020       Lab Results   Component Value Date    CHOL 177 06/15/2016     Lab Results   Component Value Date    TRIG 177  (H) 07/12/2024     Lab Results   Component Value Date    HDL 66 07/12/2024     Lab Results   Component Value Date    LDLCALC 129 (H) 07/12/2024     Lab Results   Component Value Date    HGBA1C 5.7 01/14/2025       Results for orders placed or performed in visit on 01/14/25   POCT hemoglobin A1c    Collection Time: 01/14/25  2:16 PM   Result Value Ref Range    Hemoglobin A1C 5.7 <=6.5   Albumin / creatinine urine ratio    Collection Time: 01/14/25  2:17 PM   Result Value Ref Range    Creatinine, Ur 137.7 Reference range not established. mg/dL    Albumin,U,Random 11.2 <20.0 mg/L    Albumin Creat Ratio 8 0 - 30 mg/g creatinine       Orders Placed This Encounter   Procedures    Albumin / creatinine urine ratio    CBC    Comprehensive metabolic panel    Lipid panel    TSH, 3rd generation    POCT hemoglobin A1c       ALLERGIES:  No Known Allergies    Current Medications     Current Outpatient Medications   Medication Sig Dispense Refill    levothyroxine 200 mcg tablet take 1 tablet by mouth once daily IN THE EARLY MORNING 90 tablet 1    Multiple Vitamins-Minerals (MULTIVITAMIN WOMEN 50+ PO) Take 1 caplet by mouth daily      semaglutide, 2 mg/dose, (Ozempic) 8 mg/ mL injection pen Inject 0.75 mL (2 mg total) under the skin every 7 days 3 mL 5    traZODone (DESYREL) 100 mg tablet Take 2 tablets (200 mg total) by mouth daily at bedtime 180 tablet 1     No current facility-administered medications for this visit.         Health Maintenance     Health Maintenance   Topic Date Due    Hepatitis C Screening  Never done    Pneumococcal Vaccine: 65+ Years (1 of 2 - PCV) Never done    Zoster Vaccine (2 of 2) 12/18/2024    Diabetic Foot Exam  04/02/2025    Breast Cancer Screening: Mammogram  05/10/2025    Fall Risk  07/10/2025    Urinary Incontinence Screening  07/10/2025    Medicare Annual Wellness Visit (AWV)  07/10/2025    Kidney Health Evaluation: GFR  07/12/2025    HEMOGLOBIN A1C  07/14/2025    Depression Screening  01/14/2026     Kidney Health Evaluation: Albumin/Creatinine Ratio  01/14/2026    DM Eye Exam  02/19/2026    Colorectal Cancer Screening  03/15/2028    RSV Vaccine Age 60+ Years (1 - 1-dose 75+ series) 03/09/2030    Osteoporosis Screening  Completed    Influenza Vaccine  Completed    COVID-19 Vaccine  Completed    Meningococcal B Vaccine  Aged Out    RSV Vaccine age 0-20 Months  Aged Out    HIB Vaccine  Aged Out    IPV Vaccine  Aged Out    Hepatitis A Vaccine  Aged Out    Meningococcal ACWY Vaccine  Aged Out    HPV Vaccine  Aged Out     Immunization History   Administered Date(s) Administered    COVID-19 PFIZER VACCINE 0.3 ML IM 03/24/2021, 04/15/2021, 10/26/2021    COVID-19 Pfizer mRNA vacc PF stefano-sucrose 12 yr and older (Comirnaty) 10/05/2023, 10/23/2024    COVID-19 Pfizer vac (Stefano-sucrose, gray cap) 12 yr+ IM 04/01/2022    COVID-19, unspecified 04/01/2022    INFLUENZA 10/05/2020, 10/26/2021    Influenza Quadrivalent Preservative Free 3 years and older IM 11/24/2015, 09/25/2017    Influenza, high dose seasonal 0.7 mL 09/26/2022    Influenza, injectable, quadrivalent, preservative free 0.5 mL 10/15/2018, 09/23/2019    Influenza, seasonal, injectable 01/07/2013, 12/30/2013    Zoster Vaccine Recombinant 10/23/2024    influenza, trivalent, adjuvanted 10/23/2024       Chapin Rogers MD    I spent 30 minutes with this patient of which greater than 50% was spent counseling or reviewing chart

## 2025-01-15 NOTE — ASSESSMENT & PLAN NOTE
Diabetes.  A1c has reduced to 5.7.  Patient was recommended to incorporate a walking regimen to assist with weight loss  Lab Results   Component Value Date    HGBA1C 5.7 01/14/2025

## 2025-01-23 ENCOUNTER — APPOINTMENT (OUTPATIENT)
Dept: LAB | Facility: CLINIC | Age: 70
End: 2025-01-23
Payer: COMMERCIAL

## 2025-01-23 DIAGNOSIS — E11.9 TYPE 2 DIABETES MELLITUS WITHOUT COMPLICATION, WITHOUT LONG-TERM CURRENT USE OF INSULIN (HCC): ICD-10-CM

## 2025-01-23 DIAGNOSIS — E03.9 ACQUIRED HYPOTHYROIDISM: ICD-10-CM

## 2025-01-23 LAB
ALBUMIN SERPL BCG-MCNC: 3.9 G/DL (ref 3.5–5)
ALP SERPL-CCNC: 117 U/L (ref 34–104)
ALT SERPL W P-5'-P-CCNC: 22 U/L (ref 7–52)
ANION GAP SERPL CALCULATED.3IONS-SCNC: 8 MMOL/L (ref 4–13)
AST SERPL W P-5'-P-CCNC: 17 U/L (ref 13–39)
BILIRUB SERPL-MCNC: 0.55 MG/DL (ref 0.2–1)
BUN SERPL-MCNC: 14 MG/DL (ref 5–25)
CALCIUM SERPL-MCNC: 9.4 MG/DL (ref 8.4–10.2)
CHLORIDE SERPL-SCNC: 101 MMOL/L (ref 96–108)
CHOLEST SERPL-MCNC: 199 MG/DL (ref ?–200)
CO2 SERPL-SCNC: 30 MMOL/L (ref 21–32)
CREAT SERPL-MCNC: 0.84 MG/DL (ref 0.6–1.3)
ERYTHROCYTE [DISTWIDTH] IN BLOOD BY AUTOMATED COUNT: 12.5 % (ref 11.6–15.1)
GFR SERPL CREATININE-BSD FRML MDRD: 71 ML/MIN/1.73SQ M
GLUCOSE P FAST SERPL-MCNC: 103 MG/DL (ref 65–99)
HCT VFR BLD AUTO: 44.1 % (ref 34.8–46.1)
HDLC SERPL-MCNC: 70 MG/DL
HGB BLD-MCNC: 14 G/DL (ref 11.5–15.4)
LDLC SERPL CALC-MCNC: 98 MG/DL (ref 0–100)
MCH RBC QN AUTO: 29.6 PG (ref 26.8–34.3)
MCHC RBC AUTO-ENTMCNC: 31.7 G/DL (ref 31.4–37.4)
MCV RBC AUTO: 93 FL (ref 82–98)
NONHDLC SERPL-MCNC: 129 MG/DL
PLATELET # BLD AUTO: 257 THOUSANDS/UL (ref 149–390)
PMV BLD AUTO: 10.2 FL (ref 8.9–12.7)
POTASSIUM SERPL-SCNC: 4.5 MMOL/L (ref 3.5–5.3)
PROT SERPL-MCNC: 6.5 G/DL (ref 6.4–8.4)
RBC # BLD AUTO: 4.73 MILLION/UL (ref 3.81–5.12)
SODIUM SERPL-SCNC: 139 MMOL/L (ref 135–147)
TRIGL SERPL-MCNC: 156 MG/DL (ref ?–150)
TSH SERPL DL<=0.05 MIU/L-ACNC: 0.04 UIU/ML (ref 0.45–4.5)
WBC # BLD AUTO: 5.71 THOUSAND/UL (ref 4.31–10.16)

## 2025-01-23 PROCEDURE — 36415 COLL VENOUS BLD VENIPUNCTURE: CPT

## 2025-01-23 PROCEDURE — 85027 COMPLETE CBC AUTOMATED: CPT

## 2025-01-23 PROCEDURE — 84443 ASSAY THYROID STIM HORMONE: CPT

## 2025-01-23 PROCEDURE — 80061 LIPID PANEL: CPT

## 2025-01-23 PROCEDURE — 80053 COMPREHEN METABOLIC PANEL: CPT

## 2025-01-24 ENCOUNTER — RESULTS FOLLOW-UP (OUTPATIENT)
Dept: FAMILY MEDICINE CLINIC | Facility: CLINIC | Age: 70
End: 2025-01-24

## 2025-01-24 NOTE — TELEPHONE ENCOUNTER
----- Message from Chapin Rogers MD sent at 1/24/2025  6:03 AM EST -----  Recent blood work stable with the exception of receiving too much thyroid medication.  I would recommend taking 1 tablet Monday through Friday and half a tablet on Saturday and Sunday.  Repeat thyroid test in 3 months

## 2025-03-27 ENCOUNTER — VBI (OUTPATIENT)
Dept: ADMINISTRATIVE | Facility: OTHER | Age: 70
End: 2025-03-27

## 2025-03-27 NOTE — TELEPHONE ENCOUNTER
03/27/25 2:23 PM     Chart reviewed for Mammogram was/were not submitted to the patient's insurance.     Emelina Mercado MA   [facility]

## 2025-04-19 ENCOUNTER — OFFICE VISIT (OUTPATIENT)
Dept: URGENT CARE | Facility: CLINIC | Age: 70
End: 2025-04-19
Payer: COMMERCIAL

## 2025-04-19 VITALS
HEART RATE: 80 BPM | OXYGEN SATURATION: 96 % | TEMPERATURE: 101.7 F | DIASTOLIC BLOOD PRESSURE: 80 MMHG | SYSTOLIC BLOOD PRESSURE: 154 MMHG | RESPIRATION RATE: 18 BRPM

## 2025-04-19 DIAGNOSIS — J06.9 UPPER RESPIRATORY TRACT INFECTION, UNSPECIFIED TYPE: Primary | ICD-10-CM

## 2025-04-19 PROCEDURE — 99213 OFFICE O/P EST LOW 20 MIN: CPT | Performed by: NURSE PRACTITIONER

## 2025-04-19 RX ORDER — AZITHROMYCIN 250 MG/1
TABLET, FILM COATED ORAL
Qty: 6 TABLET | Refills: 0 | Status: SHIPPED | OUTPATIENT
Start: 2025-04-19 | End: 2025-04-23

## 2025-04-19 NOTE — PROGRESS NOTES
[facility]        NAME: Radha Yu is a 70 y.o. female  : 1955    MRN: [id number]  DATE: 2025  TIME: 11:31 AM    Assessment and Plan   Upper respiratory tract infection, unspecified type [J06.9]  1. Upper respiratory tract infection, unspecified type  azithromycin (ZITHROMAX) 250 mg tablet            Patient Instructions     Viral Vs Bacterial infection   Take med as prescribed   Mucinex DM OTC PRN  Follow up with PCP in 3-5 days.  Proceed to  ER if symptoms worsen.    If tests have been performed at [facility], our office will contact you with results if changes need to be made to the care plan discussed with you at the visit.  [facility]    Chief Complaint     Chief Complaint   Patient presents with    Fever     Patient with cough, fever, headache x4 days.          History of Present Illness       Fever - 9 weeks to 74 years   The current episode started in the past 7 days. The problem occurs 2 to 4 times per day. The problem has been waxing and waning. The maximum temperature noted was 101 to 101.9 F. The temperature was taken using an oral thermometer. Associated symptoms include coughing, diarrhea, headaches and wheezing. Pertinent negatives include no abdominal pain, chest pain, congestion, ear pain, muscle aches, nausea, rash, sleepiness, sore throat, urinary pain or vomiting.   Risk factors: no contaminated food, no contaminated water, no hx of cancer, no immunosuppression, no occupational exposure, no recent sickness, no recent travel and no sick contacts        Review of Systems   Review of Systems   Constitutional:  Positive for fever.   HENT:  Negative for congestion, ear pain and sore throat.    Respiratory:  Positive for cough and wheezing.    Cardiovascular:  Negative for chest pain.   Gastrointestinal:  Positive for diarrhea. Negative for abdominal pain, nausea and vomiting.   Genitourinary:  Negative for dysuria.   Skin:   Negative for rash.   Neurological:  Positive for headaches.         Current Medications       Current Outpatient Medications:     azithromycin (ZITHROMAX) 250 mg tablet, Take 2 tablets today then 1 tablet daily x 4 days, Disp: 6 tablet, Rfl: 0    levothyroxine 200 mcg tablet, take 1 tablet by mouth once daily IN THE EARLY MORNING, Disp: 90 tablet, Rfl: 1    Multiple Vitamins-Minerals (MULTIVITAMIN WOMEN 50+ PO), Take 1 caplet by mouth daily, Disp: , Rfl:     semaglutide, 2 mg/dose, (Ozempic) 8 mg/ mL injection pen, Inject 0.75 mL (2 mg total) under the skin every 7 days, Disp: 3 mL, Rfl: 5    traZODone (DESYREL) 100 mg tablet, Take 2 tablets (200 mg total) by mouth daily at bedtime, Disp: 180 tablet, Rfl: 1    Current Allergies     Allergies as of 04/19/2025    (No Known Allergies)            The following portions of the patient's history were reviewed and updated as appropriate: allergies, current medications, past family history, past medical history, past social history, past surgical history and problem list.     Past Medical History:   Diagnosis Date    Colon polyp     COVID-19     5/2022    Diabetes mellitus (HCC)     controlled with diet    Disease of thyroid gland     hypo    Hyperlipidemia     Hypertension     Insomnia     Melanoma (HCC) 2016    multiple    Rectocele     Screening for colon cancer     Skin cancer        Past Surgical History:   Procedure Laterality Date    CHOLECYSTECTOMY      COLONOSCOPY      GASTRECTOMY  2001    HI COLONOSCOPY FLX DX W/COLLJ SPEC WHEN PFRMD N/A 10/31/2017    Procedure: COLONOSCOPY with bx and tatoo at 20cm, polypectomies x2;  Surgeon: Jerica Hester MD;  Location: [facility];  Service: General    HI REPAIR RECTOCELE SEPARATE PROCEDURE N/A 6/5/2023    Procedure: RECTOCELE REPAIR;  Surgeon: Cisco Unger MD;  Location: [facility];  Service: UroGynecology           SKIN CANCER EXCISION      left neck areal; left upper arm; back       Family History   Problem Relation  Age of Onset    Dementia Mother     Diabetes Mother     Hyperlipidemia Father     Heart attack Father     Diabetes Father     Heart disease Father     Hypertension Father     No Known Problems Sister     No Known Problems Sister     No Known Problems Sister     No Known Problems Sister     No Known Problems Daughter     No Known Problems Maternal Grandmother     No Known Problems Maternal Grandfather     No Known Problems Paternal Grandmother     No Known Problems Paternal Grandfather     Melanoma Son 20    No Known Problems Maternal Aunt     No Known Problems Maternal Aunt     No Known Problems Paternal Aunt          Medications have been verified.        Objective   /80   Pulse 80   Temp (!) 101.7 °F (38.7 °C)   Resp 18   SpO2 96%   No LMP recorded. Patient is postmenopausal.       Physical Exam     Physical Exam  Constitutional:       General: She is not in acute distress.     Appearance: She is ill-appearing.   HENT:      Head:      Comments: Mild TTP of the sinuses     Right Ear: Tympanic membrane normal.      Left Ear: Tympanic membrane normal.      Nose: Rhinorrhea present.      Mouth/Throat:      Pharynx: No posterior oropharyngeal erythema.      Comments: Post nasal drip  Cardiovascular:      Rate and Rhythm: Regular rhythm.      Heart sounds: Normal heart sounds.   Pulmonary:      Effort: Pulmonary effort is normal.      Comments: Mild congestion in the lungs  Lymphadenopathy:      Cervical: No cervical adenopathy.

## 2025-04-21 DIAGNOSIS — G47.00 INSOMNIA, UNSPECIFIED TYPE: ICD-10-CM

## 2025-04-21 RX ORDER — TRAZODONE HYDROCHLORIDE 100 MG/1
200 TABLET ORAL
Qty: 180 TABLET | Refills: 1 | Status: SHIPPED | OUTPATIENT
Start: 2025-04-21

## 2025-06-30 ENCOUNTER — VBI (OUTPATIENT)
Dept: ADMINISTRATIVE | Facility: OTHER | Age: 70
End: 2025-06-30

## 2025-06-30 NOTE — TELEPHONE ENCOUNTER
06/30/25 10:33 AM     Chart reviewed for Mammogram ; nothing is submitted to the patient's insurance at this time.     Marcia Butler MA   [facility]

## 2025-07-08 DIAGNOSIS — E78.49 OTHER HYPERLIPIDEMIA: ICD-10-CM

## 2025-07-08 DIAGNOSIS — E11.00 TYPE 2 DIABETES MELLITUS WITH HYPEROSMOLARITY WITHOUT COMA, WITHOUT LONG-TERM CURRENT USE OF INSULIN (HCC): Primary | ICD-10-CM

## 2025-07-08 DIAGNOSIS — E03.9 ACQUIRED HYPOTHYROIDISM: ICD-10-CM

## 2025-07-10 ENCOUNTER — APPOINTMENT (OUTPATIENT)
Dept: LAB | Facility: CLINIC | Age: 70
End: 2025-07-10
Payer: COMMERCIAL

## 2025-07-10 DIAGNOSIS — E11.00 TYPE 2 DIABETES MELLITUS WITH HYPEROSMOLARITY WITHOUT COMA, WITHOUT LONG-TERM CURRENT USE OF INSULIN (HCC): ICD-10-CM

## 2025-07-10 DIAGNOSIS — E78.49 OTHER HYPERLIPIDEMIA: ICD-10-CM

## 2025-07-10 DIAGNOSIS — E03.9 ACQUIRED HYPOTHYROIDISM: ICD-10-CM

## 2025-07-10 LAB
ALBUMIN SERPL BCG-MCNC: 4.2 G/DL (ref 3.5–5)
ALP SERPL-CCNC: 111 U/L (ref 34–104)
ALT SERPL W P-5'-P-CCNC: 21 U/L (ref 7–52)
ANION GAP SERPL CALCULATED.3IONS-SCNC: 5 MMOL/L (ref 4–13)
AST SERPL W P-5'-P-CCNC: 19 U/L (ref 13–39)
BILIRUB SERPL-MCNC: 0.51 MG/DL (ref 0.2–1)
BUN SERPL-MCNC: 15 MG/DL (ref 5–25)
CALCIUM SERPL-MCNC: 9.9 MG/DL (ref 8.4–10.2)
CHLORIDE SERPL-SCNC: 103 MMOL/L (ref 96–108)
CHOLEST SERPL-MCNC: 222 MG/DL (ref ?–200)
CO2 SERPL-SCNC: 32 MMOL/L (ref 21–32)
CREAT SERPL-MCNC: 0.84 MG/DL (ref 0.6–1.3)
EST. AVERAGE GLUCOSE BLD GHB EST-MCNC: 123 MG/DL
GFR SERPL CREATININE-BSD FRML MDRD: 70 ML/MIN/1.73SQ M
GLUCOSE P FAST SERPL-MCNC: 88 MG/DL (ref 65–99)
HBA1C MFR BLD: 5.9 %
HDLC SERPL-MCNC: 71 MG/DL
LDLC SERPL CALC-MCNC: 123 MG/DL (ref 0–100)
NONHDLC SERPL-MCNC: 151 MG/DL
POTASSIUM SERPL-SCNC: 4.6 MMOL/L (ref 3.5–5.3)
PROT SERPL-MCNC: 6.6 G/DL (ref 6.4–8.4)
SODIUM SERPL-SCNC: 140 MMOL/L (ref 135–147)
TRIGL SERPL-MCNC: 140 MG/DL (ref ?–150)
TSH SERPL DL<=0.05 MIU/L-ACNC: 0.04 UIU/ML (ref 0.45–4.5)

## 2025-07-10 PROCEDURE — 36415 COLL VENOUS BLD VENIPUNCTURE: CPT

## 2025-07-10 PROCEDURE — 84443 ASSAY THYROID STIM HORMONE: CPT

## 2025-07-10 PROCEDURE — 80061 LIPID PANEL: CPT

## 2025-07-10 PROCEDURE — 83036 HEMOGLOBIN GLYCOSYLATED A1C: CPT

## 2025-07-10 PROCEDURE — 80053 COMPREHEN METABOLIC PANEL: CPT

## 2025-07-15 ENCOUNTER — OFFICE VISIT (OUTPATIENT)
Dept: FAMILY MEDICINE CLINIC | Facility: CLINIC | Age: 70
End: 2025-07-15
Payer: COMMERCIAL

## 2025-07-15 VITALS
HEIGHT: 62 IN | DIASTOLIC BLOOD PRESSURE: 76 MMHG | WEIGHT: 164 LBS | HEART RATE: 65 BPM | RESPIRATION RATE: 22 BRPM | SYSTOLIC BLOOD PRESSURE: 140 MMHG | TEMPERATURE: 98 F | BODY MASS INDEX: 30.18 KG/M2 | OXYGEN SATURATION: 98 %

## 2025-07-15 DIAGNOSIS — E78.49 OTHER HYPERLIPIDEMIA: Primary | ICD-10-CM

## 2025-07-15 DIAGNOSIS — E03.9 HYPOTHYROIDISM, UNSPECIFIED TYPE: ICD-10-CM

## 2025-07-15 DIAGNOSIS — E11.00 TYPE 2 DIABETES MELLITUS WITH HYPEROSMOLARITY WITHOUT COMA, WITHOUT LONG-TERM CURRENT USE OF INSULIN (HCC): ICD-10-CM

## 2025-07-15 PROCEDURE — G2211 COMPLEX E/M VISIT ADD ON: HCPCS | Performed by: FAMILY MEDICINE

## 2025-07-15 PROCEDURE — 99214 OFFICE O/P EST MOD 30 MIN: CPT | Performed by: FAMILY MEDICINE

## 2025-07-15 PROCEDURE — G0439 PPPS, SUBSEQ VISIT: HCPCS | Performed by: FAMILY MEDICINE

## 2025-07-15 RX ORDER — LEVOTHYROXINE SODIUM 150 UG/1
150 TABLET ORAL DAILY
Qty: 100 TABLET | Refills: 3 | Status: SHIPPED | OUTPATIENT
Start: 2025-07-15

## 2025-07-15 RX ORDER — ROSUVASTATIN CALCIUM 5 MG/1
5 TABLET, COATED ORAL DAILY
Qty: 100 TABLET | Refills: 3 | Status: SHIPPED | OUTPATIENT
Start: 2025-07-15

## (undated) DEVICE — FORCEP ELECSURG RADIAL JAW4 2.2 X 240CM  HOT BX

## (undated) DEVICE — BULB SYRINGE,IRRIGATION WITH PROTECTIVE CAP: Brand: DOVER

## (undated) DEVICE — ALLENTOWN DR  LUCENTE S LAP PK: Brand: CARDINAL HEALTH

## (undated) DEVICE — 2000CC GUARDIAN II: Brand: GUARDIAN

## (undated) DEVICE — DECANTER: Brand: UNBRANDED

## (undated) DEVICE — MEDI-VAC YANKAUER SUCTION HANDLE W/BULBOUS AND CONTROL VENT: Brand: CARDINAL HEALTH

## (undated) DEVICE — TUBING SUCTION 5MM X 12 FT

## (undated) DEVICE — MARKER SPOT EX  BOWEL TATTOO SYRINGE

## (undated) DEVICE — CAUTERY TIP POLISHER: Brand: DEVON

## (undated) DEVICE — NEEDLE HYPO 22G X 1-1/2 IN

## (undated) DEVICE — IV FLUSH NSS 10ML POSIFLUSH

## (undated) DEVICE — SUT VICRYL 2-0 SH 27 IN UNDYED J417H

## (undated) DEVICE — CATH FOLEY 18FR 5ML 2 WAY UNCOATED SILICONE

## (undated) DEVICE — UNDER BUTTOCKS DRAPE W/FLUID CONTROL POUCH: Brand: CONVERTORS

## (undated) DEVICE — GLOVE PI ULTRA TOUCH SZ.8.0

## (undated) DEVICE — SMOKE EVACUATION TUBING WITH 8 IN INTEGRAL WAND AND SPONGE GUARD: Brand: BUFFALO FILTER

## (undated) DEVICE — ELECTROSURGICAL DEVICE HOLSTER;FOR USE WITH MAXIMUM PEAK VOLTAGE OF 4000 V: Brand: FORCE TRIVERSE

## (undated) DEVICE — NEEDLE SCLERO INTERJECT 25G 240MM

## (undated) DEVICE — SCD SEQUENTIAL COMPRESSION COMFORT SLEEVE MEDIUM KNEE LENGTH: Brand: KENDALL SCD

## (undated) DEVICE — PREMIUM DRY TRAY LF: Brand: MEDLINE INDUSTRIES, INC.